# Patient Record
Sex: FEMALE | Race: WHITE | NOT HISPANIC OR LATINO | Employment: OTHER | URBAN - METROPOLITAN AREA
[De-identification: names, ages, dates, MRNs, and addresses within clinical notes are randomized per-mention and may not be internally consistent; named-entity substitution may affect disease eponyms.]

---

## 2017-04-23 ENCOUNTER — HOSPITAL ENCOUNTER (EMERGENCY)
Facility: HOSPITAL | Age: 57
Discharge: HOME | End: 2017-04-23
Attending: EMERGENCY MEDICINE
Payer: COMMERCIAL

## 2017-04-23 VITALS
WEIGHT: 180 LBS | BODY MASS INDEX: 35.34 KG/M2 | RESPIRATION RATE: 20 BRPM | HEIGHT: 60 IN | HEART RATE: 86 BPM | OXYGEN SATURATION: 98 % | TEMPERATURE: 99 F | DIASTOLIC BLOOD PRESSURE: 66 MMHG | SYSTOLIC BLOOD PRESSURE: 122 MMHG

## 2017-04-23 DIAGNOSIS — R05.9 COUGH: ICD-10-CM

## 2017-04-23 DIAGNOSIS — M94.0 COSTOCHONDRITIS, ACUTE: ICD-10-CM

## 2017-04-23 DIAGNOSIS — J40 BRONCHITIS: Primary | ICD-10-CM

## 2017-04-23 LAB
ALBUMIN SERPL BCP-MCNC: 3.9 G/DL
ALP SERPL-CCNC: 110 U/L
ALT SERPL W/O P-5'-P-CCNC: 23 U/L
ANION GAP SERPL CALC-SCNC: 12 MMOL/L
AST SERPL-CCNC: 25 U/L
BASOPHILS # BLD AUTO: 0.01 K/UL
BASOPHILS NFR BLD: 0.2 %
BILIRUB SERPL-MCNC: 0.8 MG/DL
BNP SERPL-MCNC: <10 PG/ML
BUN SERPL-MCNC: 16 MG/DL
CALCIUM SERPL-MCNC: 8.9 MG/DL
CHLORIDE SERPL-SCNC: 103 MMOL/L
CO2 SERPL-SCNC: 23 MMOL/L
CREAT SERPL-MCNC: 0.9 MG/DL
D DIMER PPP IA.FEU-MCNC: 0.39 MG/L FEU
DIFFERENTIAL METHOD: ABNORMAL
EOSINOPHIL # BLD AUTO: 0.1 K/UL
EOSINOPHIL NFR BLD: 1.6 %
ERYTHROCYTE [DISTWIDTH] IN BLOOD BY AUTOMATED COUNT: 13.7 %
EST. GFR  (AFRICAN AMERICAN): >60 ML/MIN/1.73 M^2
EST. GFR  (NON AFRICAN AMERICAN): >60 ML/MIN/1.73 M^2
FLUAV AG SPEC QL IA: NEGATIVE
FLUBV AG SPEC QL IA: NEGATIVE
GLUCOSE SERPL-MCNC: 112 MG/DL
GLUCOSE SERPL-MCNC: 133 MG/DL (ref 70–110)
HCT VFR BLD AUTO: 44.3 %
HGB BLD-MCNC: 15.2 G/DL
LYMPHOCYTES # BLD AUTO: 0.6 K/UL
LYMPHOCYTES NFR BLD: 11.5 %
MCH RBC QN AUTO: 30.6 PG
MCHC RBC AUTO-ENTMCNC: 34.3 %
MCV RBC AUTO: 89 FL
MONOCYTES # BLD AUTO: 0.5 K/UL
MONOCYTES NFR BLD: 8.6 %
NEUTROPHILS # BLD AUTO: 4.3 K/UL
NEUTROPHILS NFR BLD: 76.8 %
PLATELET # BLD AUTO: 190 K/UL
PMV BLD AUTO: 10.5 FL
POTASSIUM SERPL-SCNC: 4.4 MMOL/L
PROT SERPL-MCNC: 7.4 G/DL
RBC # BLD AUTO: 4.97 M/UL
SODIUM SERPL-SCNC: 138 MMOL/L
SPECIMEN SOURCE: NORMAL
TROPONIN I SERPL DL<=0.01 NG/ML-MCNC: <0.006 NG/ML
WBC # BLD AUTO: 5.55 K/UL

## 2017-04-23 PROCEDURE — 93005 ELECTROCARDIOGRAM TRACING: CPT

## 2017-04-23 PROCEDURE — 82962 GLUCOSE BLOOD TEST: CPT

## 2017-04-23 PROCEDURE — 84484 ASSAY OF TROPONIN QUANT: CPT

## 2017-04-23 PROCEDURE — 83880 ASSAY OF NATRIURETIC PEPTIDE: CPT

## 2017-04-23 PROCEDURE — 85379 FIBRIN DEGRADATION QUANT: CPT

## 2017-04-23 PROCEDURE — 87400 INFLUENZA A/B EACH AG IA: CPT

## 2017-04-23 PROCEDURE — 85025 COMPLETE CBC W/AUTO DIFF WBC: CPT

## 2017-04-23 PROCEDURE — 96360 HYDRATION IV INFUSION INIT: CPT

## 2017-04-23 PROCEDURE — 80053 COMPREHEN METABOLIC PANEL: CPT

## 2017-04-23 PROCEDURE — 93010 ELECTROCARDIOGRAM REPORT: CPT | Mod: ,,, | Performed by: INTERNAL MEDICINE

## 2017-04-23 PROCEDURE — 99284 EMERGENCY DEPT VISIT MOD MDM: CPT | Mod: 25

## 2017-04-23 PROCEDURE — 25000003 PHARM REV CODE 250: Performed by: NURSE PRACTITIONER

## 2017-04-23 RX ORDER — PROMETHAZINE HYDROCHLORIDE AND CODEINE PHOSPHATE 6.25; 1 MG/5ML; MG/5ML
5 SOLUTION ORAL EVERY 4 HOURS PRN
Qty: 118 ML | Refills: 0 | Status: SHIPPED | OUTPATIENT
Start: 2017-04-23 | End: 2017-05-03

## 2017-04-23 RX ORDER — AZITHROMYCIN 250 MG/1
250 TABLET, FILM COATED ORAL DAILY
Qty: 6 TABLET | Refills: 0 | Status: SHIPPED | OUTPATIENT
Start: 2017-04-23 | End: 2017-05-03

## 2017-04-23 RX ORDER — CETIRIZINE HYDROCHLORIDE 10 MG/1
10 TABLET ORAL DAILY
Qty: 30 TABLET | Refills: 0 | COMMUNITY
Start: 2017-04-23 | End: 2019-10-31

## 2017-04-23 RX ORDER — BENZONATATE 100 MG/1
100 CAPSULE ORAL 3 TIMES DAILY PRN
Qty: 20 CAPSULE | Refills: 0 | Status: SHIPPED | OUTPATIENT
Start: 2017-04-23 | End: 2017-05-03

## 2017-04-23 RX ORDER — BENZONATATE 100 MG/1
100 CAPSULE ORAL 3 TIMES DAILY PRN
Status: DISCONTINUED | OUTPATIENT
Start: 2017-04-23 | End: 2017-04-23 | Stop reason: HOSPADM

## 2017-04-23 RX ADMIN — BENZONATATE 100 MG: 100 CAPSULE ORAL at 01:04

## 2017-04-23 RX ADMIN — SODIUM CHLORIDE 1000 ML: 0.9 INJECTION, SOLUTION INTRAVENOUS at 01:04

## 2017-04-23 NOTE — ED PROVIDER NOTES
Encounter Date: 2017       History     Chief Complaint   Patient presents with    Cough     with fever of 100.7 since yesterday, states took Aleve at 1100 today.     Review of patient's allergies indicates:  No Known Allergies  HPI  Past Medical History:   Diagnosis Date    Diabetes mellitus     Hyperlipidemia     Hypertension     Osteopenia      Past Surgical History:   Procedure Laterality Date     SECTION      CHOLECYSTECTOMY       History reviewed. No pertinent family history.  Social History   Substance Use Topics    Smoking status: Never Smoker    Smokeless tobacco: None    Alcohol use No     Review of Systems    Physical Exam   Initial Vitals   BP Pulse Resp Temp SpO2   17 1313 17 1313 17 1313 17 1313 17 1313   120/67 103 16 98.2 °F (36.8 °C) 94 %     Physical Exam    ED Course   Procedures  Labs Reviewed   CBC W/ AUTO DIFFERENTIAL - Abnormal; Notable for the following:        Result Value    Lymph # 0.6 (*)     Gran% 76.8 (*)     Lymph% 11.5 (*)     All other components within normal limits   COMPREHENSIVE METABOLIC PANEL - Abnormal; Notable for the following:     Glucose 112 (*)     All other components within normal limits   INFLUENZA A AND B ANTIGEN   TROPONIN I   B-TYPE NATRIURETIC PEPTIDE   D DIMER, QUANTITATIVE   POCT GLUCOSE MONITORING CONTINUOUS                          Attending Attestation:     Physician Attestation Statement for NP/PA:   I have conducted a face to face encounter with this patient in addition to the NP/PA, due to NP/PA Request                  ED Course     Clinical Impression:   {Add your Clinical Impression here. If you haven't documented one yet, please pend the note, finalize a Clinical Impression, and refresh your note before signing.:[id number]}

## 2017-04-23 NOTE — ED PROVIDER NOTES
Encounter Date: 2017       History     Chief Complaint   Patient presents with    Cough     with fever of 100.7 since yesterday, states took Aleve at 1100 today.     Review of patient's allergies indicates:  No Known Allergies  HPI Comments: 58yo female with pmhx of HTN, HLD, DM, and osteopenia is here for cough with fever Tmax 100.7 since Friday, not yesterday as reported to triage.  Pt states that her daughter-in-law has similar symptoms.  She reports a nonproductive cough with nasal congestion and sore throat.  She also reports rib pain when coughing.  She denies CP, SOB, vomiting, diarrhea, and rash.  She has tried nothing for her symptoms.  No history of pneumonia, COPD, PE, or DVT. Never smoker.     Patient is a 57 y.o. female presenting with the following complaint: cough. The history is provided by the patient.   Cough   This is a new problem. Illness onset: Friday. The problem occurs constantly. The problem has been gradually worsening. The cough is non-productive. The maximum temperature recorded prior to her arrival was 100 - 100.9 F. The fever has been present for less than 1 day. Associated symptoms include chest pain, chills, rhinorrhea, sore throat and myalgias. Pertinent negatives include no headaches and no shortness of breath. She has tried nothing for the symptoms. She is not a smoker. Her past medical history does not include pneumonia, COPD or asthma.     Past Medical History:   Diagnosis Date    Diabetes mellitus     Hyperlipidemia     Hypertension     Osteopenia      Past Surgical History:   Procedure Laterality Date     SECTION      CHOLECYSTECTOMY       History reviewed. No pertinent family history.  Social History   Substance Use Topics    Smoking status: Never Smoker    Smokeless tobacco: None    Alcohol use No     Review of Systems   Constitutional: Positive for activity change, appetite change, chills and fever.   HENT: Positive for congestion, postnasal drip,  rhinorrhea and sore throat. Negative for trouble swallowing.    Respiratory: Positive for cough. Negative for shortness of breath.    Cardiovascular: Positive for chest pain. Negative for palpitations and leg swelling.   Gastrointestinal: Negative for abdominal pain and vomiting.   Musculoskeletal: Positive for myalgias. Negative for back pain and neck pain.   Allergic/Immunologic: Negative for immunocompromised state.   Neurological: Negative for headaches.   Psychiatric/Behavioral: Negative for confusion.       Physical Exam   Initial Vitals   BP Pulse Resp Temp SpO2   04/23/17 1313 04/23/17 1313 04/23/17 1313 04/23/17 1313 04/23/17 1313   120/67 103 16 98.2 °F (36.8 °C) 94 %     Physical Exam    Nursing note and vitals reviewed.  Constitutional: She appears well-developed and well-nourished. She is Obese . She is active and cooperative.  Non-toxic appearance. She does not have a sickly appearance. She appears ill. No distress.   HENT:   Head: Normocephalic and atraumatic.   Right Ear: External ear normal.   Left Ear: External ear normal.   Nose: Rhinorrhea present.   Mouth/Throat: Uvula is midline, oropharynx is clear and moist and mucous membranes are normal.   Eyes: Conjunctivae and EOM are normal.   Neck: Normal range of motion. Neck supple.   Cardiovascular: Normal rate, regular rhythm and normal heart sounds.   Pulmonary/Chest: Effort normal. She has no decreased breath sounds. She has no wheezes. She has rhonchi in the left upper field. She has no rales.   Abdominal: Soft. Normal appearance and bowel sounds are normal. She exhibits no distension. There is no tenderness. There is no rigidity, no rebound, no guarding and no CVA tenderness.   Neurological: She is alert and oriented to person, place, and time. She has normal strength. GCS eye subscore is 4. GCS verbal subscore is 5. GCS motor subscore is 6.   Skin: Skin is warm, dry and intact. No bruising and no rash noted. No erythema.   Psychiatric: She has  a normal mood and affect. Her speech is normal and behavior is normal. Judgment and thought content normal. Cognition and memory are normal.         ED Course   Procedures  Labs Reviewed   CBC W/ AUTO DIFFERENTIAL - Abnormal; Notable for the following:        Result Value    Lymph # 0.6 (*)     Gran% 76.8 (*)     Lymph% 11.5 (*)     All other components within normal limits   COMPREHENSIVE METABOLIC PANEL - Abnormal; Notable for the following:     Glucose 112 (*)     All other components within normal limits   INFLUENZA A AND B ANTIGEN   TROPONIN I   B-TYPE NATRIURETIC PEPTIDE   D DIMER, QUANTITATIVE   POCT GLUCOSE MONITORING CONTINUOUS              Imaging Results         X-Ray Chest PA And Lateral (Final result) Result time:  04/23/17 13:49:38    Final result by Glory Fajardo MD (04/23/17 13:49:38)    Impression:      No acute findings              Electronically signed by: GLORY FAJARDO MD  Date:     04/23/17  Time:    13:49     Narrative:    Comparison: None    Technique: PA and lateral views of the chest was obtained    Findings: The cardiac and mediastinal silhouettes are within normal limits.   The lungs are clear bilaterally. Visualized osseous structures demonstrate no acute abnormality.              Medical Decision Making:   Initial Assessment:   56yo female with pmhx of HTN, HLD, DM, and osteopenia is here for nasal congestion, nonproductive cough, and bilateral rib pain since Friday. Pt appears ill but nontoxic. SaO2 on RA 94%.  Pt has no history of COPD or asthma, never smoker. HR tachycardic. Lungs with rhonchi in LUF.  Abd soft, non-tender, no r/r/g, no distention.  Legs normal.   Differential Diagnosis:   Pneumonia, pneumothorax, bronchitis, dehydration, arrhythmia, electrolyte derangement, influenza, PE  Clinical Tests:   Lab Tests: Reviewed and Ordered  The following lab test(s) were unremarkable: CBC, CMP, Troponin, BNP and D-Dimer  Radiological Study: Ordered and Reviewed  Medical Tests:  Ordered and Reviewed  ED Management:  Labs, IV fluids, CXR, EKG  SaO2 on RA 97% at reassessment. CXR negative for infiltrate.  I feel the pt's symptoms are due to bronchitis and zithromax. Due to pt's history of DM, will RX Zithromax, Phenergan with Codeine cough syrup, tessalon, and zyrtec. Advised increased fluid intake and f/u with PCP within 2 days.  Return to the ED if condition changes, progresses, or if there are concerns.  Pt verbalized understanding and compliance.               Attending Attestation:     Physician Attestation Statement for NP/PA:   I have conducted a face to face encounter with this patient in addition to the NP/PA, due to NP/PA Request    Other NP/PA Attestation Additions:    History of Present Illness: Agree; 56 y/o F presents to ED c/o cough, onset a few sorensen ago; gradually worsening; cough is non-productive; TMax 100.9; No SOB reported;    Physical Exam: Agree   Medical Decision Making: Agree; CXR negative; Px has Rx for zithromax, promethazine with codeine, tessalon, and zyrtec; Instructed to continue medications, f/u  With PCP, return with new or worsening sx.                 ED Course     Clinical Impression:   The primary encounter diagnosis was Bronchitis. Diagnoses of Cough and Costochondritis, acute were also pertinent to this visit.          Renetta Zendejas, DALE  04/23/17 1512       John Moser MD  04/23/17 1605

## 2017-04-23 NOTE — ED NOTES
Cough, headache, sore throat, body aches with fever that began friday.  Daughter-in-law has had same symptoms recently.

## 2017-04-25 LAB — POCT GLUCOSE: 133 MG/DL (ref 70–110)

## 2017-07-31 ENCOUNTER — TELEPHONE (OUTPATIENT)
Dept: OBSTETRICS AND GYNECOLOGY | Facility: CLINIC | Age: 57
End: 2017-07-31

## 2017-07-31 NOTE — TELEPHONE ENCOUNTER
----- Message from Ngoc Adame sent at 7/31/2017 12:15 PM CDT -----  Contact: Osman from [facility]/[phone]  Osman called to get MAMMO orders sent to them for the patient.    Please call and advise.

## 2017-08-08 ENCOUNTER — OFFICE VISIT (OUTPATIENT)
Dept: SURGERY | Facility: CLINIC | Age: 57
End: 2017-08-08
Payer: COMMERCIAL

## 2017-08-08 VITALS
WEIGHT: 187.38 LBS | BODY MASS INDEX: 36.79 KG/M2 | SYSTOLIC BLOOD PRESSURE: 123 MMHG | HEIGHT: 60 IN | DIASTOLIC BLOOD PRESSURE: 88 MMHG | HEART RATE: 79 BPM | TEMPERATURE: 98 F

## 2017-08-08 DIAGNOSIS — Z01.818 PREOP TESTING: Primary | ICD-10-CM

## 2017-08-08 PROCEDURE — 99244 OFF/OP CNSLTJ NEW/EST MOD 40: CPT | Mod: S$GLB,,, | Performed by: SURGERY

## 2017-08-08 PROCEDURE — 99999 PR PBB SHADOW E&M-EST. PATIENT-LVL III: CPT | Mod: PBBFAC,,, | Performed by: SURGERY

## 2017-08-08 NOTE — PROGRESS NOTES
"GENERAL SURGERY CLINIC NOTE    Etta Irwin is a 57 y.o.  female with Hx of HTN, HLD, NIDDM2, depression, osteopenia, and newly diagnosed melanoma who presents to clinic today for evaluation. Patient reports first noticing the right lateral upper thigh lesion approx 1 year ago in retrospect. She states that she initially thought it was "a blood blister" so she thought nothing of it. Color was dark red initially. She became concerned over time when the lesion in questions became progressively darker in color and was near black. She was seen by Dermatologist (Dr. Lomeli) on 17. Punch biopsy of the lesion was performed with pathology showing superficial spreading malignant melanoma with maximum depth 0.66 mm. She denies significant sun exposure over her lifetime. States "I've never been one to be outside much."    No family history of melanoma or other skin cancers.  No ASA, Plavix, anti-coagulant.  Denies MI, stroke.      ROS:   Gen: No F/C, unintentional weight loss, night sweats, fatigue  Cardio: No chest pain, palpitations, syncope  Resp: No shortness of breath, cough, wheeze  GI: No abdominal pain, N/V, change in bowel habits, change in stool caliber or color, bleeding per rectum  : No dysuria, hematuria, frequency      Past Medical History:   Diagnosis Date    Depression     Diabetes mellitus     Hyperlipidemia     Hypertension     Osteopenia      Past Surgical History:   Procedure Laterality Date     SECTION      CHOLECYSTECTOMY      Laparoscopic     Social History     Social History    Marital status:      Spouse name: N/A    Number of children: N/A    Years of education: N/A     Occupational History    Not on file.     Social History Main Topics    Smoking status: Never Smoker    Smokeless tobacco: Not on file    Alcohol use No    Drug use: No    Sexual activity: Yes     Partners: Male     Other Topics Concern    Not on file     Social History Narrative    " No narrative on file     Review of patient's allergies indicates:  No Known Allergies      PHYSICAL EXAM:  Vitals:    08/08/17 1041   BP: 123/88   Pulse: 79   Temp: 98.4 °F (36.9 °C)       General: NAD  Neuro: AAOx4  Cardio: S1 and S2, RRR  Resp: CTAB, breathing even and unlabored  Abd: Soft, ND, NT  Ext: Warm and well perfused  Skin: Right lateral upper thigh biopsy site with scab overlying, no visible residual discolored lesion  Groin: No palpable inguinal lymphadenopathy bilaterally      PATHOLOGY:  Skin lesion punch biopsy (7/27/17): Malignant melanoma, superficial spreading type, 0.66 mm in thickness. The lesion focally extends to the peripheral margins. Morales level IV. Mitotic counts 3/mm2. No ulceration. No perineural invasion. No microscopic satellitosis. Moderate tumor infiltrating lymphocytes. No associated melanocytic nevus.      ASSESSMENT/PLAN:  57 y.o. female with newly diagnosed melanoma (T1b, 3/mm2, superficial spreading)    - Reviewed pathology report with patient today  - Lengthy discussion regarding her condition and her treatment options  - Recommended wide local excision and sentinel lymph node biopsy  - Risks, benefits, alternatives to the procedure discussed with the patient who wishes to proceed  - All questions and concerns addressed  - Consents obtained today      Kavon Byrnes MD  Surgery Resident, PGY-III  Pager: [phone]  8/8/2017 11:12 AM    I have personally taken the history and examined this patient and agree with the resident's note as stated above.  The patient presents today referred by Dr. Missy Lomeli for consultation regarding surgical management of a new right lateral thigh superficial spreading malignant melanoma measuring 0.66 mm in Breslow depth.  This was a T1b lesion with 3 mitoses per millimeter squared area.  There was no evidence of ulceration.  The lesion focally extended to the peripheral margins.  There is no evidence of in-transit disease or satellitosis.   There is no evidence of regional right inguinal lymphadenopathy.   I have recommended a 1 cm wide local excision with a sentinel lymph node biopsy for the T1b melanoma.  She has been consented and scheduled for a 1 severe wide local excision with a right groin sentinel lymph node biopsy in the main OR on Wednesday, August 16, 2017 under choice anesthesia.  This would be a good case for a laryngeal mask airway/general anesthesia.  We will perform sentinel lymph node injection intraoperatively and perform mapping intraoperatively as she should drain and mapped to the right groin.

## 2017-08-08 NOTE — LETTER
August 11, 2017      [facility]ica Coller [facility], MD  [address] [place] [address]  [place] [address]           [facility]  [address]  [facility] [address]  Phone: [phone]  Fax: [phone]          Patient: Etta Irwin   MR Number: [id number]   YOB: 1960   Date of Visit: 8/8/2017       Dear Dr. Jessica [facility]ler [facility]:    Thank you for referring Etta Irwin to me for evaluation. Attached you will find relevant portions of my assessment and plan of care.    If you have questions, please do not hesitate to call me. I look forward to following Etta Irwin along with you.    Sincerely,        Enclosure  CC:  No Recipients    If you would like to receive this communication electronically, please contact [email or web address] or [phone] to request more information on [employer] access.    For providers and/or their staff who would like to refer a patient to [facility], please contact us through our one-stop-shop provider referral line, [facility], at [phone].    If you feel you have received this communication in error or would no longer like to receive these types of communications, please e-mail [email or web address]

## 2017-08-08 NOTE — LETTER
[facility]  [address]  [facility] [address]  Phone: [phone]  Fax: [phone] August 12, 2017      Jessica Coller [facility], MD  [address] [place] [address]    Patient: Etta Irwin   MR Number: [id number]   YOB: 1960   Date of Visit: 8/8/2017     Dear Dr. [facility]:    Thank you for referring Etta Irwin to me for evaluation.     The patient presents today referred by Dr. Missy Lomeli for consultation regarding surgical management of a new right lateral thigh superficial spreading malignant melanoma measuring 0.66 mm in Breslow depth.  This was a T1b lesion with 3 mitoses per millimeter squared area.  There was no evidence of ulceration.  The lesion focally extended to the peripheral margins. There is no evidence of in-transit disease or satellitosis.  There is no evidence of regional right inguinal lymphadenopathy.     I have recommended a 1 cm wide local excision with a sentinel lymph node biopsy for the T1b melanoma.  She has been consented and scheduled for a 1 severe wide local excision with a right groin sentinel lymph node biopsy in the main OR on Wednesday, August 16, 2017 under choice anesthesia.  This would be a good case for a laryngeal mask airway/general anesthesia.  We will perform sentinel lymph node injection intraoperatively and perform mapping intraoperatively as she should drain and mapped to the right groin.    If you have questions, please do not hesitate to call me. I look forward to following Etta Irwin along with you.    Sincerely,      Huy Dunlap MD  Medical Director, [facility]  Section of General and Oncologic Surgery  [facility]    RLC/hcr    CC  Missy Lomeli MD

## 2017-08-09 DIAGNOSIS — C43.9 MALIGNANT MELANOMA, UNSPECIFIED SITE: Primary | ICD-10-CM

## 2017-08-15 ENCOUNTER — ANESTHESIA EVENT (OUTPATIENT)
Dept: SURGERY | Facility: HOSPITAL | Age: 57
End: 2017-08-15
Payer: COMMERCIAL

## 2017-08-15 ENCOUNTER — HOSPITAL ENCOUNTER (OUTPATIENT)
Dept: CARDIOLOGY | Facility: CLINIC | Age: 57
Discharge: HOME | End: 2017-08-15
Payer: COMMERCIAL

## 2017-08-15 ENCOUNTER — HOSPITAL ENCOUNTER (OUTPATIENT)
Dept: PREADMISSION TESTING | Facility: HOSPITAL | Age: 57
Discharge: HOME | End: 2017-08-15
Attending: ANESTHESIOLOGY
Payer: COMMERCIAL

## 2017-08-15 ENCOUNTER — TELEPHONE (OUTPATIENT)
Dept: SURGERY | Facility: CLINIC | Age: 57
End: 2017-08-15

## 2017-08-15 VITALS
RESPIRATION RATE: 18 BRPM | HEART RATE: 80 BPM | OXYGEN SATURATION: 98 % | TEMPERATURE: 98 F | DIASTOLIC BLOOD PRESSURE: 59 MMHG | HEIGHT: 60 IN | WEIGHT: 189.69 LBS | SYSTOLIC BLOOD PRESSURE: 119 MMHG | BODY MASS INDEX: 37.24 KG/M2

## 2017-08-15 DIAGNOSIS — Z01.818 PREOP TESTING: ICD-10-CM

## 2017-08-15 PROCEDURE — 93000 ELECTROCARDIOGRAM COMPLETE: CPT | Mod: S$GLB,,, | Performed by: INTERNAL MEDICINE

## 2017-08-15 RX ORDER — AMOXICILLIN 500 MG
2 CAPSULE ORAL DAILY
COMMUNITY

## 2017-08-15 RX ORDER — VITAMIN E 268 MG
400 CAPSULE ORAL DAILY
COMMUNITY

## 2017-08-15 NOTE — DISCHARGE INSTRUCTIONS
Your surgery has been scheduled for:__________________________________________    You should report to:  ____[facility], located on the [place] side of the first floor of the           [facility] ([phone])  ____[facility], located on the [facility] side of the            Second floor of the [facility] [phone])  ____3rd Floor [facility] located on the [facility] side of the [facility] [phone]  Please Note   - Tell your doctor if you take Aspirin, products containing Aspirin, herbal medications  or blood thinners, such as Coumadin, Ticlid, or Plavix.  (Consult your provider regarding holding or stopping before surgery).  - Arrange for someone to drive you home following surgery.  You will not be allowed to leave the surgical facility alone or drive yourself home following sedation and anesthesia.  Before Surgery  - Stop taking all herbal medications 14days prior to surgery  - No Motrin/Advil (Ibuprofen) 7 days before surgery  - No Aleve (Naproxen) 7 days before surgery  - Stop Taking Asprin, products containing Asprin _____days before surgery  - Stop taking blood thinners_______days before surgery  - Refrain from drinking alcoholic beverages for 24hours before and after surgery  - Stop or limit smoking _________days before surgery  Night before Surgery  - DO NOT EAT OR DRINK ANYTHING AFTER MIDNIGHT, INCLUDING GUM, HARD CANDY, MINTS, OR CHEWING TOBACCO.  - Take a shower or bath (shower is recommended).  Bathe with Hibiclens soap or an antibacterial soap from the neck down.  If not supplied by your surgeon, hibiclens soap will need to be purchased over the counter in pharmacy.  Rinse soap off thoroughly.  - Shampoo your hair with your regular shampoo  The Day of Surgery  - Take another bath or shower with hibiclens or any antibacterial soap, to reduce the chance of infection.  - Take heart and blood  pressure medications with a small sip of water, as advised by the perioperative team.  - Do not take fluid pills  - You may brush your teeth and rinse your mouth, but do not swall any additional water.   - Do not apply perfumes, powder, body lotions or deodorant on the day of surgery.  - Nail polish should be removed.  - Do not wear makeup or moisturizer  - Wear comfortable clothes, such as a button front shirt and loose fitting pants.  - Leave all jewelry, including body piercings, and valuables at home.    - Bring any devices you will neeed after surgery such as crutches or canes.  - If you have sleep apnea, please bring your CPAP machine  In the event that your physical condition changes including the onset of a cold or respiratory illness, or if you have to delay or cancel your surgery, please notify your surgeon.  Anesthesia: General Anesthesia  Youre due to have surgery. During surgery, youll be given medication called anesthesia. (It is also called anesthetic.) This will keep you comfortable and pain-free. Your anesthesia provider will use general anesthesia. This sheet tells you more about it.  What is general anesthesia?     You are watched continuously during your procedure by the anesthesia provider   General anesthesia puts you into a state like deep sleep. It goes into the bloodstream (IV anesthetics), into the lungs (gas anesthetics), or both. You feel nothing during the procedure. You will not remember it. During the procedure, the anesthesia provider monitors you continuously. He or she checks your heart rate and rhythm, blood pressure, breathing, and blood oxygen.  · IV Anesthetics. IV anesthetics are given through an IV line in your arm. Theyre often given first. This is so you are asleep before a gas anesthetic is started. Some kinds of IV anesthetics relieve pain. Others relax you. Your doctor will decide which kind is best in your case.  · Gas Anesthetics. Gas anesthetics are breathed into  the lungs. They are often used to keep you asleep. They can be given through a facemask or a tube placed in your larynx or trachea (breathing tube).  ? If you have a facemask, your anesthesia provider will most likely place it over your nose and mouth while youre still awake. Youll breathe oxygen through the mask as your IV anesthetic is started. Gas anesthetic may be added through the mask.  ? If you have a tube in the larynx or trachea, it will be inserted into your throat after youre asleep.  Anesthesia tools and medications  You will likely have:  · IV anesthetics. These are put into an IV line into your bloodstream.  · Gas anesthetics. You breathe these anesthetics into your lungs, where they pass into your bloodstream.  · Pulse oximeter. This is a small clip that is attached to the end of your finger. This measures your blood oxygen level.  · Electrocardiography leads (electrodes). These are small sticky pads that are placed on your chest. They record your heart rate and rhythm.  · Blood pressure cuff. This reads your blood pressure.  Risks and possible complications  General anesthesia has some risks. These include:  · Breathing problems  · Nausea and vomiting  · Sore throat or hoarseness (usually temporary)  · Allergic reaction to the anesthetic  · Irregular heartbeat (rare)  · Cardiac arrest (rare)   Anesthesia safety  · Follow all instructions you are given for how long not to eat or drink before your procedure.  · Be sure your doctor knows what medications and drugs you take. This includes over-the-counter medications, herbs, supplements, alcohol or other drugs. You will be asked when those were last taken.  · Have an adult family member or friend drive you home after the procedure.  · For the first 24 hours after your surgery:  ? Do not drive or use heavy equipment.  ? Have a trusted family member or spouse make important decisions or sign documents.  ? Avoid alcohol.  ? Have a responsible adult stay  with you. He or she can watch for problems and help keep you safe.  Date Last Reviewed: 10/16/2014  © 5780-1856 [employer] [address] [place] [address], [place] [address]. All rights reserved. This information is not intended as a substitute for professional medical care. Always follow your healthcare professional's instructions.

## 2017-08-15 NOTE — ANESTHESIA PREPROCEDURE EVALUATION
Pre-operative evaluation for Procedure(s) (LRB):  EXCISION-WIDE LOCAL  1 cm Right Lateral Thigh (Right)  BIOPSY-LYMPH NODE-SENTINEL right groin (Right)  INJECTION-NODE-SENTINEL (Right)    Etta Irwin is a 57 y.o. female with past history significant for HTN, DMII, HLD, depression and newly diagnosed melanoma on her R lateral thigh who presents for above procedures.    Prev airway: none on file      There is no problem list on file for this patient.      Review of patient's allergies indicates:  No Known Allergies     No current facility-administered medications on file prior to encounter.      Current Outpatient Prescriptions on File Prior to Encounter   Medication Sig Dispense Refill    alendronate (FOSAMAX) 70 MG tablet Take 1 tablet by mouth once daily.      amitriptyline (ELAVIL) 50 MG tablet Take 1 tablet by mouth every evening.       atorvastatin (LIPITOR) 40 MG tablet Take 1 tablet by mouth every evening.       cetirizine (ZYRTEC) 10 MG tablet Take 1 tablet (10 mg total) by mouth once daily. 30 tablet 0    escitalopram oxalate (LEXAPRO) 20 MG tablet Take 1 tablet by mouth every evening.       JANUMET  mg per tablet Take 1 tablet by mouth 2 (two) times daily with meals.       JARDIANCE 10 mg Tab       lisinopril (PRINIVIL,ZESTRIL) 20 MG tablet Take 1 tablet by mouth once daily.      pioglitazone (ACTOS) 30 MG tablet Take 1 tablet by mouth once daily.         Past Surgical History:   Procedure Laterality Date     SECTION      CHOLECYSTECTOMY      Laparoscopic       Social History     Social History    Marital status:      Spouse name: N/A    Number of children: N/A    Years of education: N/A     Occupational History    Not on file.     Social History Main Topics    Smoking status: Never Smoker    Smokeless tobacco: Not on file    Alcohol use No    Drug use: No    Sexual activity: Yes     Partners: Male     Other Topics Concern    Not on file     Social History  Narrative    No narrative on file         Vital Signs Range (Last 24H):  Temp:  [36.7 °C (98.1 °F)]   Pulse:  [80]   Resp:  [18]   BP: (119)/(59)   SpO2:  [98 %]       CBC:   Recent Labs      08/15/17   1444   WBC  7.13   RBC  4.91   HGB  14.8   HCT  44.8   PLT  254   MCV  91   MCH  30.1   MCHC  33.0       CMP: No results for input(s): NA, K, CL, CO2, BUN, CREATININE, GLU, MG, PHOS, CALCIUM, ALBUMIN, PROT, ALKPHOS, ALT, AST, BILITOT in the last 72 hours.    INR  No results for input(s): INR, PROTIME, APTT in the last 72 hours.    Invalid input(s): PT        EKG 8/15/2017:  Vent. Rate : 076 BPM     Atrial Rate : 076 BPM     P-R Int : 142 ms          QRS Dur : 080 ms      QT Int : 378 ms       P-R-T Axes : 030 024 047 degrees     QTc Int : 425 ms    Normal sinus rhythm  Normal ECG  When compared with ECG of 23-APR-2017 14:12,  Nonspecific T wave abnormality no longer evident in Anterior-lateral leads    2D Echo:  None on file        Anesthesia Evaluation    I have reviewed the Patient Summary Reports.        Review of Systems  Anesthesia Hx:  No problems with previous Anesthesia  History of prior surgery of interest to airway management or planning: Previous anesthesia: General  Denies Personal Hx of Anesthesia complications.   Social:  Non-Smoker, No Alcohol Use    Hematology/Oncology:  Hematology Normal      Current/Recent Cancer. 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  Denies CP or sob   Pulmonary:   Acute Bronchitis: (4/2017-tx with abx and steroid injection. f/u with PCP done 5/2017. Patient states no more breathing issues since then. ) secondary to URI.  Possible Obstructive Sleep Apnea , (STOP/BANG) Symptoms A - Age > 50, S - Snoring (loud) and P - Pressure being treated for high BP    Renal/:  Renal/ Normal     Hepatic/GI:   Denies GERD. Denies Liver Disease.    Musculoskeletal:  Musculoskeletal Normal     Neurological:   Denies CVA. Denies Seizures.    Endocrine:   Diabetes, type 2 Patient is followed by an outside PCP. Patient did not know her A1c but reports her blood glucose is <100.   Dermatological:   Melanoma right thigh   Psych:   depression          Physical Exam  General:  Well nourished    Airway/Jaw/Neck:  Airway Findings: Mouth Opening: Small, but > 3cm Tongue: Normal  General Airway Assessment: Adult  Mallampati: III  Improves to II with phonation.  Jaw/Neck Findings:  Neck ROM: Normal ROM      Dental:  Dental Findings: In tact   Chest/Lungs:  Chest/Lungs Findings: Clear to auscultation, Normal Respiratory Rate     Heart/Vascular:  Heart Findings: Rate: Normal  Rhythm: Regular Rhythm  Sounds: Normal        Mental Status:  Mental Status Findings:  Cooperative, Alert and Oriented           Marisel Leon RN 08/15/2017            Anesthesia Plan  Type of Anesthesia, risks & benefits discussed:  Anesthesia Type:  general  Patient's Preference:   Intra-op Monitoring Plan: standard ASA monitors  Intra-op Monitoring Plan Comments:   Post Op Pain Control Plan: multimodal analgesia and IV/PO Opioids PRN  Post Op Pain Control Plan Comments:   Induction:   IV  Beta Blocker:  Patient is not currently on a Beta-Blocker (No further documentation required).       Informed Consent: Patient understands risks and agrees with Anesthesia plan.  Questions answered. Anesthesia consent signed with patient.  ASA Score: 2     Day of Surgery Review of History & Physical:    H&P update referred to the surgeon.     Anesthesia Plan Notes: Patient on lisinopril        Ready For Surgery From Anesthesia Perspective.

## 2017-08-16 ENCOUNTER — SURGERY (OUTPATIENT)
Age: 57
End: 2017-08-16

## 2017-08-16 ENCOUNTER — HOSPITAL ENCOUNTER (OUTPATIENT)
Dept: RADIOLOGY | Facility: HOSPITAL | Age: 57
Discharge: HOME | End: 2017-08-16
Attending: SURGERY
Payer: COMMERCIAL

## 2017-08-16 ENCOUNTER — HOSPITAL ENCOUNTER (OUTPATIENT)
Facility: HOSPITAL | Age: 57
Discharge: HOME | End: 2017-08-16
Attending: SURGERY | Admitting: SURGERY
Payer: COMMERCIAL

## 2017-08-16 ENCOUNTER — ANESTHESIA (OUTPATIENT)
Dept: SURGERY | Facility: HOSPITAL | Age: 57
End: 2017-08-16
Payer: COMMERCIAL

## 2017-08-16 DIAGNOSIS — C43.9 MALIGNANT MELANOMA: Primary | ICD-10-CM

## 2017-08-16 DIAGNOSIS — C43.9 MALIGNANT MELANOMA, UNSPECIFIED SITE: ICD-10-CM

## 2017-08-16 LAB
POCT GLUCOSE: 101 MG/DL (ref 70–110)
POCT GLUCOSE: 112 MG/DL (ref 70–110)

## 2017-08-16 PROCEDURE — 63600175 PHARM REV CODE 636 W HCPCS: Performed by: SURGERY

## 2017-08-16 PROCEDURE — 37000009 HC ANESTHESIA EA ADD 15 MINS: Performed by: SURGERY

## 2017-08-16 PROCEDURE — 38792 RA TRACER ID OF SENTINL NODE: CPT | Mod: TC

## 2017-08-16 PROCEDURE — 71000039 HC RECOVERY, EACH ADD'L HOUR: Performed by: SURGERY

## 2017-08-16 PROCEDURE — 36000706: Performed by: SURGERY

## 2017-08-16 PROCEDURE — D9220A PRA ANESTHESIA: Mod: ,,, | Performed by: ANESTHESIOLOGY

## 2017-08-16 PROCEDURE — 25000003 PHARM REV CODE 250: Performed by: STUDENT IN AN ORGANIZED HEALTH CARE EDUCATION/TRAINING PROGRAM

## 2017-08-16 PROCEDURE — 71000015 HC POSTOP RECOV 1ST HR: Performed by: SURGERY

## 2017-08-16 PROCEDURE — 63600175 PHARM REV CODE 636 W HCPCS

## 2017-08-16 PROCEDURE — 11604 EXC TR-EXT MAL+MARG 3.1-4 CM: CPT | Mod: 51,,, | Performed by: SURGERY

## 2017-08-16 PROCEDURE — 94761 N-INVAS EAR/PLS OXIMETRY MLT: CPT

## 2017-08-16 PROCEDURE — A9520 TC99 TILMANOCEPT DIAG 0.5MCI: HCPCS

## 2017-08-16 PROCEDURE — 38500 BIOPSY/REMOVAL LYMPH NODES: CPT | Mod: RT,,, | Performed by: SURGERY

## 2017-08-16 PROCEDURE — 88341 IMHCHEM/IMCYTCHM EA ADD ANTB: CPT | Mod: 26,,, | Performed by: PATHOLOGY

## 2017-08-16 PROCEDURE — 12034 INTMD RPR S/TR/EXT 7.6-12.5: CPT | Mod: 59,,, | Performed by: SURGERY

## 2017-08-16 PROCEDURE — 88305 TISSUE EXAM BY PATHOLOGIST: CPT | Performed by: PATHOLOGY

## 2017-08-16 PROCEDURE — 82962 GLUCOSE BLOOD TEST: CPT | Performed by: SURGERY

## 2017-08-16 PROCEDURE — 38900 IO MAP OF SENT LYMPH NODE: CPT | Mod: ,,, | Performed by: SURGERY

## 2017-08-16 PROCEDURE — 38792 RA TRACER ID OF SENTINL NODE: CPT | Mod: ,,, | Performed by: RADIOLOGY

## 2017-08-16 PROCEDURE — 36000707: Performed by: SURGERY

## 2017-08-16 PROCEDURE — 88342 IMHCHEM/IMCYTCHM 1ST ANTB: CPT | Mod: 26,,, | Performed by: PATHOLOGY

## 2017-08-16 PROCEDURE — 63600175 PHARM REV CODE 636 W HCPCS: Performed by: STUDENT IN AN ORGANIZED HEALTH CARE EDUCATION/TRAINING PROGRAM

## 2017-08-16 PROCEDURE — S0028 INJECTION, FAMOTIDINE, 20 MG: HCPCS | Performed by: STUDENT IN AN ORGANIZED HEALTH CARE EDUCATION/TRAINING PROGRAM

## 2017-08-16 PROCEDURE — 27000221 HC OXYGEN, UP TO 24 HOURS

## 2017-08-16 PROCEDURE — 71000033 HC RECOVERY, INTIAL HOUR: Performed by: SURGERY

## 2017-08-16 PROCEDURE — 63600175 PHARM REV CODE 636 W HCPCS: Performed by: ANESTHESIOLOGY

## 2017-08-16 PROCEDURE — 37000008 HC ANESTHESIA 1ST 15 MINUTES: Performed by: SURGERY

## 2017-08-16 RX ORDER — ONDANSETRON 2 MG/ML
INJECTION INTRAMUSCULAR; INTRAVENOUS
Status: DISCONTINUED | OUTPATIENT
Start: 2017-08-16 | End: 2017-08-16

## 2017-08-16 RX ORDER — KETOROLAC TROMETHAMINE 30 MG/ML
INJECTION, SOLUTION INTRAMUSCULAR; INTRAVENOUS
Status: COMPLETED
Start: 2017-08-16 | End: 2017-08-16

## 2017-08-16 RX ORDER — LIDOCAINE HCL/PF 100 MG/5ML
SYRINGE (ML) INTRAVENOUS
Status: DISCONTINUED | OUTPATIENT
Start: 2017-08-16 | End: 2017-08-16

## 2017-08-16 RX ORDER — SODIUM CHLORIDE 9 MG/ML
INJECTION, SOLUTION INTRAVENOUS CONTINUOUS
Status: DISCONTINUED | OUTPATIENT
Start: 2017-08-16 | End: 2017-08-16 | Stop reason: HOSPADM

## 2017-08-16 RX ORDER — OXYCODONE AND ACETAMINOPHEN 5; 325 MG/1; MG/1
1 TABLET ORAL
Status: DISCONTINUED | OUTPATIENT
Start: 2017-08-16 | End: 2017-08-16 | Stop reason: HOSPADM

## 2017-08-16 RX ORDER — PROPOFOL 10 MG/ML
VIAL (ML) INTRAVENOUS
Status: DISCONTINUED | OUTPATIENT
Start: 2017-08-16 | End: 2017-08-16

## 2017-08-16 RX ORDER — PHENYLEPHRINE HYDROCHLORIDE 10 MG/ML
INJECTION INTRAVENOUS
Status: DISCONTINUED | OUTPATIENT
Start: 2017-08-16 | End: 2017-08-16

## 2017-08-16 RX ORDER — FENTANYL CITRATE 50 UG/ML
25 INJECTION, SOLUTION INTRAMUSCULAR; INTRAVENOUS EVERY 5 MIN PRN
Status: COMPLETED | OUTPATIENT
Start: 2017-08-16 | End: 2017-08-16

## 2017-08-16 RX ORDER — ISOSULFAN BLUE 50 MG/5ML
INJECTION, SOLUTION SUBCUTANEOUS
Status: DISCONTINUED | OUTPATIENT
Start: 2017-08-16 | End: 2017-08-16 | Stop reason: HOSPADM

## 2017-08-16 RX ORDER — SODIUM CHLORIDE 0.9 % (FLUSH) 0.9 %
3 SYRINGE (ML) INJECTION
Status: DISCONTINUED | OUTPATIENT
Start: 2017-08-16 | End: 2017-08-16 | Stop reason: HOSPADM

## 2017-08-16 RX ORDER — FENTANYL CITRATE 50 UG/ML
INJECTION, SOLUTION INTRAMUSCULAR; INTRAVENOUS
Status: COMPLETED
Start: 2017-08-16 | End: 2017-08-16

## 2017-08-16 RX ORDER — KETOROLAC TROMETHAMINE 15 MG/ML
30 INJECTION, SOLUTION INTRAMUSCULAR; INTRAVENOUS ONCE
Status: DISCONTINUED | OUTPATIENT
Start: 2017-08-16 | End: 2017-08-16 | Stop reason: HOSPADM

## 2017-08-16 RX ORDER — ACETAMINOPHEN 10 MG/ML
INJECTION, SOLUTION INTRAVENOUS
Status: DISCONTINUED | OUTPATIENT
Start: 2017-08-16 | End: 2017-08-16

## 2017-08-16 RX ORDER — FENTANYL CITRATE 50 UG/ML
INJECTION, SOLUTION INTRAMUSCULAR; INTRAVENOUS
Status: DISCONTINUED | OUTPATIENT
Start: 2017-08-16 | End: 2017-08-16

## 2017-08-16 RX ORDER — MIDAZOLAM HYDROCHLORIDE 1 MG/ML
INJECTION, SOLUTION INTRAMUSCULAR; INTRAVENOUS
Status: DISCONTINUED | OUTPATIENT
Start: 2017-08-16 | End: 2017-08-16

## 2017-08-16 RX ORDER — FAMOTIDINE 10 MG/ML
INJECTION INTRAVENOUS
Status: DISCONTINUED | OUTPATIENT
Start: 2017-08-16 | End: 2017-08-16

## 2017-08-16 RX ORDER — SODIUM CHLORIDE 9 MG/ML
INJECTION, SOLUTION INTRAVENOUS CONTINUOUS
Status: DISCONTINUED | OUTPATIENT
Start: 2017-08-16 | End: 2017-08-16 | Stop reason: SDUPTHER

## 2017-08-16 RX ORDER — OXYCODONE AND ACETAMINOPHEN 5; 325 MG/1; MG/1
1 TABLET ORAL EVERY 4 HOURS PRN
Qty: 41 TABLET | Refills: 0 | Status: SHIPPED | OUTPATIENT
Start: 2017-08-16 | End: 2017-10-26

## 2017-08-16 RX ADMIN — OXYCODONE HYDROCHLORIDE AND ACETAMINOPHEN 1 TABLET: 5; 325 TABLET ORAL at 03:08

## 2017-08-16 RX ADMIN — FENTANYL CITRATE 50 MCG: 50 INJECTION, SOLUTION INTRAMUSCULAR; INTRAVENOUS at 01:08

## 2017-08-16 RX ADMIN — PROPOFOL 50 MG: 10 INJECTION, EMULSION INTRAVENOUS at 02:08

## 2017-08-16 RX ADMIN — PHENYLEPHRINE HYDROCHLORIDE 200 MCG: 10 INJECTION INTRAVENOUS at 01:08

## 2017-08-16 RX ADMIN — ONDANSETRON 4 MG: 2 INJECTION INTRAMUSCULAR; INTRAVENOUS at 01:08

## 2017-08-16 RX ADMIN — KETOROLAC TROMETHAMINE 30 MG: 30 INJECTION, SOLUTION INTRAMUSCULAR at 04:08

## 2017-08-16 RX ADMIN — LIDOCAINE HYDROCHLORIDE 60 MG: 20 INJECTION, SOLUTION INTRAVENOUS at 12:08

## 2017-08-16 RX ADMIN — FENTANYL CITRATE 100 MCG: 50 INJECTION, SOLUTION INTRAMUSCULAR; INTRAVENOUS at 12:08

## 2017-08-16 RX ADMIN — PROPOFOL 20 MG: 10 INJECTION, EMULSION INTRAVENOUS at 01:08

## 2017-08-16 RX ADMIN — PHENYLEPHRINE HYDROCHLORIDE 100 MCG: 10 INJECTION INTRAVENOUS at 02:08

## 2017-08-16 RX ADMIN — MIDAZOLAM HYDROCHLORIDE 1 MG: 1 INJECTION, SOLUTION INTRAMUSCULAR; INTRAVENOUS at 12:08

## 2017-08-16 RX ADMIN — FENTANYL CITRATE 25 MCG: 50 INJECTION INTRAMUSCULAR; INTRAVENOUS at 03:08

## 2017-08-16 RX ADMIN — ISOSULFAN BLUE 3 ML: 10 INJECTION, SOLUTION SUBCUTANEOUS at 01:08

## 2017-08-16 RX ADMIN — EPHEDRINE SULFATE 10 MG: 50 INJECTION, SOLUTION INTRAMUSCULAR; INTRAVENOUS; SUBCUTANEOUS at 01:08

## 2017-08-16 RX ADMIN — PHENYLEPHRINE HYDROCHLORIDE 100 MCG: 10 INJECTION INTRAVENOUS at 01:08

## 2017-08-16 RX ADMIN — SODIUM CHLORIDE: 0.9 INJECTION, SOLUTION INTRAVENOUS at 12:08

## 2017-08-16 RX ADMIN — FENTANYL CITRATE 50 MCG: 50 INJECTION, SOLUTION INTRAMUSCULAR; INTRAVENOUS at 02:08

## 2017-08-16 RX ADMIN — FAMOTIDINE 20 MG: 10 INJECTION, SOLUTION INTRAVENOUS at 01:08

## 2017-08-16 RX ADMIN — SODIUM CHLORIDE, SODIUM GLUCONATE, SODIUM ACETATE, POTASSIUM CHLORIDE, MAGNESIUM CHLORIDE, SODIUM PHOSPHATE, DIBASIC, AND POTASSIUM PHOSPHATE: .53; .5; .37; .037; .03; .012; .00082 INJECTION, SOLUTION INTRAVENOUS at 01:08

## 2017-08-16 RX ADMIN — PROPOFOL 150 MG: 10 INJECTION, EMULSION INTRAVENOUS at 12:08

## 2017-08-16 RX ADMIN — Medication 2 G: at 12:08

## 2017-08-16 RX ADMIN — PROPOFOL 50 MG: 10 INJECTION, EMULSION INTRAVENOUS at 12:08

## 2017-08-16 RX ADMIN — ACETAMINOPHEN 1000 MG: 10 INJECTION, SOLUTION INTRAVENOUS at 01:08

## 2017-08-16 NOTE — DISCHARGE INSTRUCTIONS
Procedural Sedation (Adult)  You have been given medicine by vein to make you sleep during your surgery. This may have included both a pain medicine and sleeping medicine. Most of the effects have worn off. But you may still have some drowsiness for the next 6 to 8 hours.  Home care  Follow these guidelines when you get home:  · For the next 8 hours, you should be watched by a responsible adult. This person should make sure your condition is not getting worse.  · Don't take any medicine by mouth for pain or for sleep during the next 4 hours. These might react with the medicines you were given in the hospital. This could cause a much stronger response than usual.  · Don't drink any alcohol for the next 24 hours.  · Don't drive, operate dangerous machinery, or make important business or personal decisions during the next 24 hours.  Follow-up care  Follow up with your healthcare provider if you are not alert and back to your usual level of activity within 12 hours.  When to seek medical advice  Call your healthcare provider right away if any of these occur:  · Drowsiness gets worse  · Weakness or dizziness gets worse  · Repeated vomiting  · You cannot be awakened   Date Last Reviewed: 10/18/2016  © 0937-6224 [employer]. [address] [place] [address], [place] [address]. All rights reserved. This information is not intended as a substitute for professional medical care. Always follow your healthcare professional's instructions.

## 2017-08-16 NOTE — H&P (VIEW-ONLY)
"GENERAL SURGERY CLINIC NOTE    Etta Irwin is a 57 y.o.  female with Hx of HTN, HLD, NIDDM2, depression, osteopenia, and newly diagnosed melanoma who presents to clinic today for evaluation. Patient reports first noticing the right lateral upper thigh lesion approx 1 year ago in retrospect. She states that she initially thought it was "a blood blister" so she thought nothing of it. Color was dark red initially. She became concerned over time when the lesion in questions became progressively darker in color and was near black. She was seen by Dermatologist (Dr. Lomeli) on 17. Punch biopsy of the lesion was performed with pathology showing superficial spreading malignant melanoma with maximum depth 0.66 mm. She denies significant sun exposure over her lifetime. States "I've never been one to be outside much."    No family history of melanoma or other skin cancers.  No ASA, Plavix, anti-coagulant.  Denies MI, stroke.      ROS:   Gen: No F/C, unintentional weight loss, night sweats, fatigue  Cardio: No chest pain, palpitations, syncope  Resp: No shortness of breath, cough, wheeze  GI: No abdominal pain, N/V, change in bowel habits, change in stool caliber or color, bleeding per rectum  : No dysuria, hematuria, frequency      Past Medical History:   Diagnosis Date    Depression     Diabetes mellitus     Hyperlipidemia     Hypertension     Osteopenia      Past Surgical History:   Procedure Laterality Date     SECTION      CHOLECYSTECTOMY      Laparoscopic     Social History     Social History    Marital status:      Spouse name: N/A    Number of children: N/A    Years of education: N/A     Occupational History    Not on file.     Social History Main Topics    Smoking status: Never Smoker    Smokeless tobacco: Not on file    Alcohol use No    Drug use: No    Sexual activity: Yes     Partners: Male     Other Topics Concern    Not on file     Social History Narrative    " No narrative on file     Review of patient's allergies indicates:  No Known Allergies      PHYSICAL EXAM:  Vitals:    08/08/17 1041   BP: 123/88   Pulse: 79   Temp: 98.4 °F (36.9 °C)       General: NAD  Neuro: AAOx4  Cardio: S1 and S2, RRR  Resp: CTAB, breathing even and unlabored  Abd: Soft, ND, NT  Ext: Warm and well perfused  Skin: Right lateral upper thigh biopsy site with scab overlying, no visible residual discolored lesion  Groin: No palpable inguinal lymphadenopathy bilaterally      PATHOLOGY:  Skin lesion punch biopsy (7/27/17): Malignant melanoma, superficial spreading type, 0.66 mm in thickness. The lesion focally extends to the peripheral margins. Morales level IV. Mitotic counts 3/mm2. No ulceration. No perineural invasion. No microscopic satellitosis. Moderate tumor infiltrating lymphocytes. No associated melanocytic nevus.      ASSESSMENT/PLAN:  57 y.o. female with newly diagnosed melanoma (T1b, 3/mm2, superficial spreading)    - Reviewed pathology report with patient today  - Lengthy discussion regarding her condition and her treatment options  - Recommended wide local excision and sentinel lymph node biopsy  - Risks, benefits, alternatives to the procedure discussed with the patient who wishes to proceed  - All questions and concerns addressed  - Consents obtained today      Kavon Byrnes MD  Surgery Resident, PGY-III  Pager: [phone]  8/8/2017 11:12 AM    I have personally taken the history and examined this patient and agree with the resident's note as stated above.  The patient presents today referred by Dr. Missy Lomeli for consultation regarding surgical management of a new right lateral thigh superficial spreading malignant melanoma measuring 0.66 mm in Breslow depth.  This was a T1b lesion with 3 mitoses per millimeter squared area.  There was no evidence of ulceration.  The lesion focally extended to the peripheral margins.  There is no evidence of in-transit disease or satellitosis.   There is no evidence of regional right inguinal lymphadenopathy.   I have recommended a 1 cm wide local excision with a sentinel lymph node biopsy for the T1b melanoma.  She has been consented and scheduled for a 1 severe wide local excision with a right groin sentinel lymph node biopsy in the main OR on Wednesday, August 16, 2017 under choice anesthesia.  This would be a good case for a laryngeal mask airway/general anesthesia.  We will perform sentinel lymph node injection intraoperatively and perform mapping intraoperatively as she should drain and mapped to the right groin.

## 2017-08-16 NOTE — PLAN OF CARE
Vital signs stable, pt on room air. Pt complains of throbbing to rt thigh, given toradol per order. Dr Esteves at bedside to assess pt, okayed transfer to [facility] for discharge.

## 2017-08-16 NOTE — OP NOTE
DATE OF PROCEDURE:  08/16/2017    LOCATION:  [facility].    PRIMARY SURGEON:  Huy Dunlap M.D.    ASSISTANT:  Kavon Byrnes M.D. (RES).    ANESTHESIA:  General anesthesia.    PREOPERATIVE DIAGNOSIS:  T1B thin invasive superficial spreading malignant   melanoma of the right lateral thigh measuring 0.66 mm in B[place] depth, but with   3 mitoses per mm2 with no evidence of ulceration.    POSTOPERATIVE DIAGNOSES:  T1B thin invasive superficial spreading malignant   melanoma of the right lateral thigh measuring 0.66 mm in B[place] depth, but with   3 mitoses per mm2 with no evidence of ulceration.    PROCEDURES:  1.  A 1 cm wide local excision of the prior 1.2 cm biopsy site of the right   lateral thigh with specimen of resection measuring 3.2 cm from medial to lateral   and 9 cm in length from superior to inferior with a primary closure of the 9 cm   wound in multiple layers of absorbable sutures with 3 layers of absorbable   suture.  2.  Injection intradermally of the isosulfan Lymphazurin blue dye and   technetium-labeled radiocolloid for sentinel lymph node identification.  3.  Right groin deep inguinal sentinel lymph node biopsy, excision x1.    PROCEDURE IN DETAIL:  The patient underwent informed consent, the history and   physical examination was reviewed and updated.  The right lateral thigh prior   biopsy site and right groin were marked and identified for the procedure.  The   patient was brought to the Operating Room.  She was in a supine position.  She   underwent general anesthesia.  The right lateral thigh was marked with a   vertical longitudinal ellipse to take a 1 cm margin around the prior 1.2 cm   biopsy site making the width of the resection 3.2 cm.  The length of the ellipse   measured approximately 9 cm.  The isosulfan Lymphazurin blue dye and the   technetium-labeled radiocolloid with the melanoma protocol were injected   intradermally within the skin around the  lesion within the area of anticipated   resection.  This was allowed to migrate to the right groin, while the right   lower extremity was prepped and draped in a sterile fashion.    A hot spot was noted in the right groin.  A small oblique incision was made in   the right groin just inferior and parallel to the inguinal crease.  The   subcutaneous tissue was divided with cautery.  We identified a hot blue sentinel   node, which was excised and it had an ex vivo count of approximately 400.  Once   this was removed, the background residual radioactivity counts were negligible   in the right groin.  There was no further blue dye staining and there were no   further palpable nodes in the right groin, indicating adequate and appropriate   sentinel lymph node biopsy.  The groin incision was irrigated and temporarily   packed with a lap pad after it was made hemostatic.  We then turned our   attention to the right lateral thigh, the skin was incised sharply to excise the   longitudinal ellipse again measuring 3.2 cm wide x 9 cm in length,   full-thickness skin and subcutaneous tissue was taken down to the underlying   fascia, which was preserved.  The specimen was removed with cautery and oriented   with a short stitch superiorly on the long axis of the ellipse and a long   stitch laterally on the short axis of the ellipse.  The specimen was sent to   Pathology for permanent sectioning as was the right groin sentinel node.  The   wound was irrigated.  It was made hemostatic.  It was closed in multiple layers,   reapproximating the subcutaneous tissue and superficial fascial layers with   interrupted 2-0 Vicryl suture with intermittent deep 3-point fixation down to   the posterior aspect of the wound near the muscular fascia to obliterate any   potential dead space and minimize postoperative fluid collection.  The deep   dermal and subcutaneous layers were further reapproximated with interrupted 3-0   Vicryl sutures to  reapproximate the skin without tension.  The skin was then   closed with a running 4-0 Monocryl subcuticular skin closure.  Mastisol,   Steri-Strips, sterile Telfa gauze and Tegaderm dressing were applied.  The groin   was inspected.  There was no evidence of bleeding.  The superficial fascial   layer was reapproximated with Vicryl sutures to obliterate any potential dead   space down to the deep underlying tissue at the biopsy site for the sentinel   node.  The deep dermal and subcutaneous layers were further closed with 3-0   Vicryl suture and the skin closed with a running 4-0 Monocryl subcuticular skin   closure.  Again Mastisol, Steri-Strips, sterile Telfa gauze and Tegaderm   dressing were applied.  She tolerated the procedure overall well without   complication.  Estimated blood loss had been minimal.  No drains were placed.    No specimens were sent for frozen section.  All specimens were sent to Pathology   for permanent sectioning.  She was turned over to Anesthesia for extubation and   transport to the recovery area in a satisfactory condition.      DIVINA/CHERYL  dd: 08/16/2017 14:27:16 (CDT)  td: 08/16/2017 18:40:09 (CD)  Doc ID   #[id number]  Job ID #[id number]    CC:

## 2017-08-16 NOTE — OR NURSING
1.) Right groin sentinel lymph node. Hot and blue 400. (permanent)  2.) Right lateral thigh 1 cm wide local excision. Short stitch= superior, long stitch= lateral. (permanent)

## 2017-08-16 NOTE — TRANSFER OF CARE
"Anesthesia Transfer of Care Note    Patient: Etta Irwin    Procedure(s) Performed: Procedure(s) (LRB):  EXCISION-WIDE LOCAL  1 cm Right Lateral Thigh (Right)  BIOPSY-LYMPH NODE-SENTINEL right groin (Right)  INJECTION-NODE-SENTINEL (Right)    Patient location: PACU    Anesthesia Type: general    Transport from OR: Transported from OR on 6-10 L/min O2 by face mask with adequate spontaneous ventilation    Post pain: adequate analgesia    Post assessment: no apparent anesthetic complications    Post vital signs: stable    Level of consciousness: awake    Nausea/Vomiting: no nausea/vomiting    Complications: none    Transfer of care protocol was followed      Last vitals:   Visit Vitals  /71   Pulse 91   Temp 36.3 °C (97.3 °F) (Temporal)   Resp 14   Ht 5' 4" (1.626 m)   Wt 84.8 kg (187 lb)   SpO2 (!) 94%   Breastfeeding? No   BMI 32.10 kg/m²     "

## 2017-08-16 NOTE — PLAN OF CARE
Patient and spouse state they are ready to be discharged. Instructions and narcotic prescription given to patient and family. Both verbalize understanding. Patient tolerating po liquids with no difficulty. Patient states pain is at a tolerable level for them. Anesthesia consent and surgical consent in chart upon patient's discharge from [facility].

## 2017-08-16 NOTE — BRIEF OP NOTE
[facility]  Brief Operative Note     SUMMARY     Surgery Date: 8/16/2017     Surgeon(s) and Role:     * Huy Dunlap MD - Primary     * Kavon Byrnes MD - Resident - Assisting    Pre-op Diagnosis:  Malignant melanoma, unspecified site [C43.9]    Post-op Diagnosis:  Post-Op Diagnosis Codes:     * Malignant melanoma, unspecified site [C43.9]    Procedure(s) (LRB):  EXCISION-WIDE LOCAL  1 cm Right Lateral Thigh (Right)  BIOPSY-LYMPH NODE-SENTINEL right groin (Right)  INJECTION-NODE-SENTINEL (Right)    Anesthesia: Local/MAC    Description of the findings of the procedure: Wide local excision of right lateral thigh melanoma with sentinel lymph node biopsy    Findings/Key Components: [place] lymph node R groin, hot 500 and blue    Estimated Blood Loss: 5 mL         Total IV Fluids: Per Anesthesia    Specimens:   Specimen (12h ago through future)    Start     Ordered    08/16/17 1348  Specimen to Pathology - Surgery  Once     Comments:  1.) Right groin sentinel lymph node. Hot and blue 400. (permanent)2.) Right lateral thigh 1 cm wide local excision. Short stitch= superior, long stitch= lateral. (permanent)      08/16/17 1347          Discharge Note    SUMMARY     Admit Date: 8/16/2017    Discharge Date:  08/16/2017    Hospital Course:  Patient is a 57-year-old female with melanoma of the right lateral thigh who presented today for planned outpatient procedure. He was taken to the Operating Room for wide local excision of the right lateral thigh melanoma and sentinel lymph node biopsy. She tolerated the procedure well with no apparent complication. She was discharged to home in good condition following an uneventful stay in Recovery.    Final Diagnosis: Post-Op Diagnosis Codes:     * Malignant melanoma, unspecified site [C43.9]    Disposition: Home or Self Care    Follow Up/Patient Instructions: Follow up with Dr. Dunlap in 2 weeks.    Medications:  Reconciled Home Medications:   Current Discharge  Medication List      START taking these medications    Details   oxycodone-acetaminophen (PERCOCET) 5-325 mg per tablet Take 1 tablet by mouth every 4 (four) hours as needed.  Qty: 41 tablet, Refills: 0         CONTINUE these medications which have NOT CHANGED    Details   alendronate (FOSAMAX) 70 MG tablet Take 1 tablet by mouth once daily.      amitriptyline (ELAVIL) 50 MG tablet Take 1 tablet by mouth every evening.       atorvastatin (LIPITOR) 40 MG tablet Take 1 tablet by mouth every evening.       escitalopram oxalate (LEXAPRO) 20 MG tablet Take 1 tablet by mouth every evening.       fish oil-omega-3 fatty acids 300-1,000 mg capsule Take 2 g by mouth once daily.      JANUMET  mg per tablet Take 1 tablet by mouth 2 (two) times daily with meals.       JARDIANCE 10 mg Tab       lisinopril (PRINIVIL,ZESTRIL) 20 MG tablet Take 1 tablet by mouth once daily.      multivitamin capsule Take 1 capsule by mouth once daily.      vitamin E 400 UNIT capsule Take 400 Units by mouth once daily.      cetirizine (ZYRTEC) 10 MG tablet Take 1 tablet (10 mg total) by mouth once daily.  Qty: 30 tablet, Refills: 0      pioglitazone (ACTOS) 30 MG tablet Take 1 tablet by mouth once daily.             Discharge Procedure Orders  Diet general     Activity as tolerated     Shower on day dressing removed (No bath)   Order Comments: Remove clear bandage and non-stick gauze in 48 hours. White tape strips to remain in place. Will fall off on their own. If not, we will remove them in clinic in 2 weeks. After 48 hours, no dressing required. May shower with soap and water. Dab dry. Do not scrub vigorously. Do not submerge incisions for 2 weeks.     Call MD for:  increased confusion or weakness     Call MD for:  persistent dizziness, light-headedness, or visual disturbances     Call MD for:  worsening rash     Call MD for:  severe persistent headache     Call MD for:  difficulty breathing or increased cough     Call MD for:  redness,  tenderness, or signs of infection (pain, swelling, redness, odor or green/yellow discharge around incision site)     Call MD for:  severe uncontrolled pain     Call MD for:  persistent nausea and vomiting or diarrhea     Call MD for:  temperature >100.4     Remove dressing in 48 hours       Follow-up Information     Huy Dunlap MD In 2 weeks.    Specialty:  General Surgery  Why:  s/p WLE with SLNB  Contact information:  Vilma Momin sasha  [facility] [id number] [phone]

## 2017-08-16 NOTE — INTERVAL H&P NOTE
The patient has been examined and the H&P has been reviewed:    No acute interval changes.    Anesthesia/Surgery risks, benefits and alternative options discussed and understood by patient/family.          Active Hospital Problems    Diagnosis  POA    Malignant melanoma [C43.9]  Yes      Resolved Hospital Problems    Diagnosis Date Resolved POA   No resolved problems to display.

## 2017-08-17 VITALS
TEMPERATURE: 98 F | RESPIRATION RATE: 18 BRPM | HEART RATE: 86 BPM | SYSTOLIC BLOOD PRESSURE: 97 MMHG | HEIGHT: 64 IN | WEIGHT: 187 LBS | BODY MASS INDEX: 31.92 KG/M2 | OXYGEN SATURATION: 99 % | DIASTOLIC BLOOD PRESSURE: 52 MMHG

## 2017-08-18 NOTE — ANESTHESIA POSTPROCEDURE EVALUATION
"Anesthesia Post Evaluation    Patient: Etta Irwin    Procedure(s) Performed: Procedure(s) (LRB):  EXCISION-WIDE LOCAL  1 cm Right Lateral Thigh (Right)  BIOPSY-LYMPH NODE-SENTINEL right groin (Right)  INJECTION-NODE-SENTINEL (Right)    Final Anesthesia Type: general  Patient location during evaluation: PACU  Patient participation: Yes- Able to Participate  Level of consciousness: awake and alert  Post-procedure vital signs: reviewed and stable  Pain management: adequate  Airway patency: patent  PONV status at discharge: No PONV  Anesthetic complications: no      Cardiovascular status: stable  Respiratory status: unassisted, spontaneous ventilation and room air  Hydration status: euvolemic  Follow-up not needed.        Visit Vitals  BP (!) 97/52   Pulse 86   Temp 36.8 °C (98.3 °F) (Skin)   Resp 18   Ht 5' 4" (1.626 m)   Wt 84.8 kg (187 lb)   SpO2 99%   Breastfeeding? No   BMI 32.10 kg/m²       Pain/Danitza Score: No Data Recorded      "

## 2017-08-23 ENCOUNTER — PATIENT MESSAGE (OUTPATIENT)
Dept: SURGERY | Facility: CLINIC | Age: 57
End: 2017-08-23

## 2017-08-31 ENCOUNTER — OFFICE VISIT (OUTPATIENT)
Dept: SURGERY | Facility: CLINIC | Age: 57
End: 2017-08-31
Payer: COMMERCIAL

## 2017-08-31 VITALS
WEIGHT: 188.81 LBS | DIASTOLIC BLOOD PRESSURE: 55 MMHG | TEMPERATURE: 98 F | HEART RATE: 80 BPM | SYSTOLIC BLOOD PRESSURE: 109 MMHG | BODY MASS INDEX: 32.23 KG/M2 | HEIGHT: 64 IN

## 2017-08-31 DIAGNOSIS — C43.71 MALIGNANT MELANOMA OF RIGHT LOWER EXTREMITY INCLUDING HIP: Primary | ICD-10-CM

## 2017-08-31 PROCEDURE — 99999 PR PBB SHADOW E&M-EST. PATIENT-LVL III: CPT | Mod: PBBFAC,,, | Performed by: SURGERY

## 2017-08-31 PROCEDURE — 99024 POSTOP FOLLOW-UP VISIT: CPT | Mod: S$GLB,,, | Performed by: SURGERY

## 2017-08-31 NOTE — MEDICAL/APP STUDENT
Mrs. Irwin is a 58 yo F with hx of malignant melanoma of right thigh. She is 2 weeks s/p wide local excision with sentinel lymph node biopsy.    Subjective: Pt is doing well with no complaints. Reports no lower limb edema. Incision sites are healing appropriately.    Objective: Right thigh incision appears clean and dry and well-healed. Right groin incision is clean dry and well-healed. No right lower limb edema present on exam.     Lymph node pathology was negative for metastatic melanoma, excisional margins negative with no residual melanocytic neoplasm present - 8/23/17    Assessment: Pt s/p wide local excision with sentinel lymph node biopsy progressing well with no complications or concerns.    Plan: Surveillance by dermatology.

## 2017-08-31 NOTE — PROGRESS NOTES
Mrs. Irwin is a 58 yo F with hx of malignant melanoma of right thigh. She is 2 weeks s/p 1 cm wide local excision with sentinel lymph node biopsy of right groin.     Subjective: Pt is doing well with no complaints. Reports no lower limb edema. Incision sites are healing appropriately.     Objective: Right thigh incision appears clean and dry and well-healed. Right groin incision is clean dry and well-healed. No right lower limb edema present on exam.      Lymph node pathology was negative for metastatic melanoma, excisional margins negative with no residual melanocytic neoplasm present - 8/23/17     Assessment: Pt s/p wide local excision with sentinel lymph node biopsy progressing well with no complications or concerns.     Plan: Surveillance by dermatology.    I have personally taken the history and examined this patient and agree with the note as stated above.    Original pathology on biopsy revealed:  PATHOLOGY:  Skin lesion punch biopsy (7/27/17): Malignant melanoma, superficial spreading type, 0.66 mm in thickness. The lesion focally extends to the peripheral margins. Morales level IV. Mitotic counts 3/mm2. No ulceration. No perineural invasion. No microscopic satellitosis. Moderate tumor infiltrating lymphocytes. No associated melanocytic nevus.        ASSESSMENT/PLAN:  57 y.o. female with newly diagnosed melanoma (T1b, 3/mm2, superficial spreading)    There was no residual invasive melanoma of the right lateral thigh with the the right groin SLNB being negative.  All incision sites are healing appropriately without signs of infection.  Patient will not require any further therapy for the early stage IB melanoma.  She will f/u with me prn.  She will f/u with her dermatologist Dr. Missy Lomeli every 3 months for whole body skin screening and surveillance exams.  A copy of the final pathology report was discussed with her and provided to her.

## 2017-08-31 NOTE — LETTER
[facility]  [address]  [place] [address]  Phone: [phone]  Fax: [phone] August 31, 2017      Jessica Coller [facility], MD  [address]  [place] [address]    Patient: Etta Irwin   MR Number: [id number]   YOB: 1960   Date of Visit: 8/31/2017     Dear Dr. [facility]:    Thank you for referring Etta Irwin to me for evaluation. Attached you will find relevant portions of my assessment and plan of care.    Etta is a 57-year-old female with newly diagnosed melanoma (T1b, 3/mm2, superficial spreading).       There was no residual invasive melanoma of the right lateral thigh with the the right groin SLNB being negative.    All incision sites are healing appropriately without signs of infection. Patient will not require any further therapy for the early stage IB melanoma.      She will f/u with me prn.  She will f/u with her dermatologist Dr. Missy Lomeli every 3 months for whole body skin screening and surveillance exams.  A copy of the final pathology report was discussed with her and provided to her.    If you have questions, please do not hesitate to call me. I look forward to following Etta Irwin along with you.    Sincerely,      Huy Dunlap MD  Medical Director, [facility] [place]  Section of General and Oncologic Surgery  [facility]    RLC/hcr    CC  Missy Lomeli MD

## 2017-08-31 NOTE — LETTER
August 31, 2017        Missy Lomeli MD  [address] [place] [address]  [place] [address]             [facility]  [address]  [facility] [address]  Phone: [phone]  Fax: [phone]   Patient: Etta Irwin   MR Number: [id number]   YOB: 1960   Date of Visit: 8/31/2017       Dear Dr. Lomeli:    Thank you for referring Etta Irwin to me for evaluation. Attached you will find relevant portions of my assessment and plan of care.    If you have questions, please do not hesitate to call me. I look forward to following Etta Irwin along with you.    Sincerely,      Huy Dunlap MD            CC  No Recipients    Enclosure

## 2017-10-26 ENCOUNTER — OFFICE VISIT (OUTPATIENT)
Dept: OBSTETRICS AND GYNECOLOGY | Facility: CLINIC | Age: 57
End: 2017-10-26
Payer: COMMERCIAL

## 2017-10-26 VITALS
SYSTOLIC BLOOD PRESSURE: 126 MMHG | DIASTOLIC BLOOD PRESSURE: 70 MMHG | HEIGHT: 64 IN | BODY MASS INDEX: 33.05 KG/M2 | WEIGHT: 193.56 LBS

## 2017-10-26 DIAGNOSIS — Z01.419 WELL WOMAN EXAM WITH ROUTINE GYNECOLOGICAL EXAM: Primary | ICD-10-CM

## 2017-10-26 DIAGNOSIS — Z12.4 ROUTINE PAPANICOLAOU SMEAR: ICD-10-CM

## 2017-10-26 PROCEDURE — 99396 PREV VISIT EST AGE 40-64: CPT | Mod: S$GLB,,, | Performed by: OBSTETRICS & GYNECOLOGY

## 2017-10-26 PROCEDURE — 88175 CYTOPATH C/V AUTO FLUID REDO: CPT

## 2017-10-26 PROCEDURE — 99999 PR PBB SHADOW E&M-EST. PATIENT-LVL II: CPT | Mod: PBBFAC,,, | Performed by: OBSTETRICS & GYNECOLOGY

## 2017-10-26 NOTE — PROGRESS NOTES
"HPI:   57 y.o.   OB History      Para Term  AB Living    2 2 2     2    SAB TAB Ectopic Multiple Live Births                    No LMP recorded. Patient is postmenopausal.    Patient is  here for her annual gynecologic exam.  She has no complaints.     ROS:  GENERAL: No fever, chills, fatigability or weight loss.  SKIN: No rashes, itching or changes in color or texture of skin.  HEAD: No headaches or recent head trauma.  EYES: Visual acuity fine. No photophobia, ocular pain or diplopia.  EARS: Denies ear pain, discharge or vertigo.  NOSE: No loss of smell, no epistaxis or postnasal drip.  MOUTH & THROAT: No hoarseness or change in voice. No excessive gum bleeding.  NODES: Denies swollen glands.  CHEST: Denies LOPEZ, cyanosis, wheezing, cough and sputum production.  CARDIOVASCULAR: Denies chest pain, PND, orthopnea or reduced exercise tolerance.  ABDOMEN: Appetite fine. No weight loss. Denies diarrhea, abdominal pain, hematemesis or blood in stool.  URINARY: No flank pain, dysuria or hematuria.  PERIPHERAL VASCULAR: No claudication or cyanosis.  MUSCULOSKELETAL: No joint stiffness or swelling. Denies back pain.  NEUROLOGIC: No history of seizures, paralysis, alteration of gait or coordination.    PE:   /70   Ht 5' 4" (1.626 m)   Wt 87.8 kg (193 lb 9 oz)   BMI 33.23 kg/m²   APPEARANCE: Well nourished, well developed, in no acute distress.  NECK: Neck symmetric without masses or thyromegaly.  BREASTS: Symmetrical, no skin changes or visible lesions. No palpable masses, nipple discharge or adenopathy bilaterally.  ABDOMEN: Flat. Soft. No tenderness or masses. No hepatosplenomegaly. No hernias. No CVA tenderness.  VULVA: No lesions. Normal female genitalia.  URETHRAL MEATUS: Normal size and location, no lesions, no prolapse.  URETHRA: No masses, tenderness, prolapse or scarring.  VAGINA: Moist and well rugated, no discharge, no significant cystocele or rectocele.  CERVIX: No lesions and discharge. " PAP done.  UTERUS: Normal size, regular shape, mobile, non-tender, bladder base nontender.  ADNEXA: No masses, tenderness or CDS nodularity.  ANUS PERINEUM: Normal.    PROCEDURES:  Pap smear    Assessment:  Normal Gynecologic Exam    Plan:  Mammogram and Colonoscopy if indicated by current recommendations.  Return to clinic in one year or for any problems or complaints.  mp gyn co, no bleeding

## 2018-11-20 ENCOUNTER — OFFICE VISIT (OUTPATIENT)
Dept: OBSTETRICS AND GYNECOLOGY | Facility: CLINIC | Age: 58
End: 2018-11-20
Payer: COMMERCIAL

## 2018-11-20 VITALS
HEIGHT: 64 IN | BODY MASS INDEX: 33.29 KG/M2 | SYSTOLIC BLOOD PRESSURE: 120 MMHG | WEIGHT: 195 LBS | DIASTOLIC BLOOD PRESSURE: 84 MMHG

## 2018-11-20 DIAGNOSIS — Z01.419 WELL WOMAN EXAM WITH ROUTINE GYNECOLOGICAL EXAM: Primary | ICD-10-CM

## 2018-11-20 DIAGNOSIS — Z12.4 ROUTINE PAPANICOLAOU SMEAR: ICD-10-CM

## 2018-11-20 PROCEDURE — 99396 PREV VISIT EST AGE 40-64: CPT | Mod: S$GLB,,, | Performed by: OBSTETRICS & GYNECOLOGY

## 2018-11-20 PROCEDURE — 99999 PR PBB SHADOW E&M-EST. PATIENT-LVL III: CPT | Mod: PBBFAC,,, | Performed by: OBSTETRICS & GYNECOLOGY

## 2018-11-20 PROCEDURE — 88175 CYTOPATH C/V AUTO FLUID REDO: CPT

## 2018-11-20 RX ORDER — METFORMIN HYDROCHLORIDE 1000 MG/1
1000 TABLET ORAL 2 TIMES DAILY WITH MEALS
Refills: 0 | COMMUNITY
Start: 2018-09-04

## 2018-11-20 NOTE — PROGRESS NOTES
"HPI:   58 y.o.   OB History      Para Term  AB Living    2 2 2     2    SAB TAB Ectopic Multiple Live Births                    No LMP recorded. Patient is postmenopausal.    Patient is  here for her annual gynecologic exam.  She has no complaints.     ROS:  GENERAL: No fever, chills, fatigability or weight loss.  SKIN: No rashes, itching or changes in color or texture of skin.  HEAD: No headaches or recent head trauma.  EYES: Visual acuity fine. No photophobia, ocular pain or diplopia.  EARS: Denies ear pain, discharge or vertigo.  NOSE: No loss of smell, no epistaxis or postnasal drip.  MOUTH & THROAT: No hoarseness or change in voice. No excessive gum bleeding.  NODES: Denies swollen glands.  CHEST: Denies LOPEZ, cyanosis, wheezing, cough and sputum production.  CARDIOVASCULAR: Denies chest pain, PND, orthopnea or reduced exercise tolerance.  ABDOMEN: Appetite fine. No weight loss. Denies diarrhea, abdominal pain, hematemesis or blood in stool.  URINARY: No flank pain, dysuria or hematuria.  PERIPHERAL VASCULAR: No claudication or cyanosis.  MUSCULOSKELETAL: No joint stiffness or swelling. Denies back pain.  NEUROLOGIC: No history of seizures, paralysis, alteration of gait or coordination.    PE:   /84   Ht 5' 4" (1.626 m)   Wt 88.5 kg (194 lb 16 oz)   BMI 33.47 kg/m²   APPEARANCE: Well nourished, well developed, in no acute distress.  NECK: Neck symmetric without masses or thyromegaly.  BREASTS: Symmetrical, no skin changes or visible lesions. No palpable masses, nipple discharge or adenopathy bilaterally.  ABDOMEN: Flat. Soft. No tenderness or masses. No hepatosplenomegaly. No hernias. No CVA tenderness.  VULVA: No lesions. Normal female genitalia.  URETHRAL MEATUS: Normal size and location, no lesions, no prolapse.  URETHRA: No masses, tenderness, prolapse or scarring.  VAGINA: Moist and well rugated, no discharge, no significant cystocele or rectocele.  CERVIX: No lesions and discharge. " PAP done.  UTERUS: Normal size, regular shape, mobile, non-tender, bladder base nontender.  ADNEXA: No masses, tenderness or CDS nodularity.  ANUS PERINEUM: Normal.    PROCEDURES:  Pap smear    Assessment:  Normal Gynecologic Exam    Plan:  Mammogram and Colonoscopy if indicated by current recommendations.  Return to clinic in one year or for any problems or complaints.  No gyn co

## 2019-06-07 ENCOUNTER — OFFICE VISIT (OUTPATIENT)
Dept: ORTHOPEDICS | Facility: CLINIC | Age: 59
End: 2019-06-07
Payer: COMMERCIAL

## 2019-06-07 ENCOUNTER — HOSPITAL ENCOUNTER (OUTPATIENT)
Dept: RADIOLOGY | Facility: HOSPITAL | Age: 59
Discharge: HOME | End: 2019-06-07
Attending: ORTHOPAEDIC SURGERY
Payer: COMMERCIAL

## 2019-06-07 ENCOUNTER — TELEPHONE (OUTPATIENT)
Dept: ORTHOPEDICS | Facility: CLINIC | Age: 59
End: 2019-06-07

## 2019-06-07 VITALS — HEIGHT: 64 IN | BODY MASS INDEX: 33.29 KG/M2 | WEIGHT: 195 LBS

## 2019-06-07 DIAGNOSIS — M17.12 PRIMARY OSTEOARTHRITIS OF LEFT KNEE: Primary | ICD-10-CM

## 2019-06-07 DIAGNOSIS — M25.562 LEFT KNEE PAIN, UNSPECIFIED CHRONICITY: ICD-10-CM

## 2019-06-07 DIAGNOSIS — M25.562 LEFT KNEE PAIN, UNSPECIFIED CHRONICITY: Primary | ICD-10-CM

## 2019-06-07 PROCEDURE — 3008F BODY MASS INDEX DOCD: CPT | Mod: CPTII,S$GLB,, | Performed by: ORTHOPAEDIC SURGERY

## 2019-06-07 PROCEDURE — 3008F PR BODY MASS INDEX (BMI) DOCUMENTED: ICD-10-PCS | Mod: CPTII,S$GLB,, | Performed by: ORTHOPAEDIC SURGERY

## 2019-06-07 PROCEDURE — 99202 PR OFFICE/OUTPT VISIT, NEW, LEVL II, 15-29 MIN: ICD-10-PCS | Mod: 25,S$GLB,, | Performed by: ORTHOPAEDIC SURGERY

## 2019-06-07 PROCEDURE — 20610 DRAIN/INJ JOINT/BURSA W/O US: CPT | Mod: LT,S$GLB,, | Performed by: ORTHOPAEDIC SURGERY

## 2019-06-07 PROCEDURE — 73564 XR KNEE COMP 4 OR MORE VIEWS LEFT: ICD-10-PCS | Mod: 26,LT,, | Performed by: INTERNAL MEDICINE

## 2019-06-07 PROCEDURE — 73564 X-RAY EXAM KNEE 4 OR MORE: CPT | Mod: 26,LT,, | Performed by: INTERNAL MEDICINE

## 2019-06-07 PROCEDURE — 20610 PR DRAIN/INJECT LARGE JOINT/BURSA: ICD-10-PCS | Mod: LT,S$GLB,, | Performed by: ORTHOPAEDIC SURGERY

## 2019-06-07 PROCEDURE — 73564 X-RAY EXAM KNEE 4 OR MORE: CPT | Mod: TC,PN,LT

## 2019-06-07 PROCEDURE — 99999 PR PBB SHADOW E&M-EST. PATIENT-LVL II: CPT | Mod: PBBFAC,,, | Performed by: ORTHOPAEDIC SURGERY

## 2019-06-07 PROCEDURE — 99999 PR PBB SHADOW E&M-EST. PATIENT-LVL II: ICD-10-PCS | Mod: PBBFAC,,, | Performed by: ORTHOPAEDIC SURGERY

## 2019-06-07 PROCEDURE — 99202 OFFICE O/P NEW SF 15 MIN: CPT | Mod: 25,S$GLB,, | Performed by: ORTHOPAEDIC SURGERY

## 2019-06-07 RX ORDER — TRIAMCINOLONE ACETONIDE 40 MG/ML
40 INJECTION, SUSPENSION INTRA-ARTICULAR; INTRAMUSCULAR
Status: COMPLETED | OUTPATIENT
Start: 2019-06-07 | End: 2019-06-07

## 2019-06-07 RX ADMIN — TRIAMCINOLONE ACETONIDE 40 MG: 40 INJECTION, SUSPENSION INTRA-ARTICULAR; INTRAMUSCULAR at 02:06

## 2019-06-07 NOTE — PROGRESS NOTES
Subjective:      Patient ID: Etta Irwin is a 59 y.o. female.    Chief Complaint: Pain and Swelling of the Left Knee    HPI     They have experienced problems with their left knee over the past 1 month. The patient denies relevant history of injury/aggravation. Pain is located medially Associated symptoms include NA.  Symptoms are aggravated by prolonged walking. They have been treated with NA.   Symptoms have recently stayed the same. Ambulation reportedly has not been impaired. Self care ADLs are not painful.     Review of Systems   Constitution: Negative for fever and weight loss.   HENT: Negative for congestion.    Eyes: Negative for visual disturbance.   Cardiovascular: Negative for chest pain.   Respiratory: Negative for shortness of breath.    Hematologic/Lymphatic: Negative for bleeding problem. Does not bruise/bleed easily.   Skin: Negative for poor wound healing.   Musculoskeletal: Positive for joint pain.   Gastrointestinal: Negative for abdominal pain.   Genitourinary: Negative for dysuria.   Neurological: Negative for seizures.   Psychiatric/Behavioral: Negative for altered mental status.   Allergic/Immunologic: Negative for persistent infections.         Objective:      Ortho/SPM Exam          Left knee    The patient is not in acute distress.   Body habitus is normal.   Sclera appear normal  No respiratory distress  The patient walks without a limp.  Resisted SLR negative.   The skin over the knee is intact.  Knee effusion 0  Tendernes is located medially to mild a degree  Range of motion- Flexion full, Extension full.   Ligament exam:   MCL 1+   Lachman intact              Post sag intact    LCL intact  Patellar apprehension negative.  Popliteal cyst negative  Patellar crepitation absent.  Flexion/pinch negative.  Pulses DP present, PT present.  Motor normal 5/5 strength in all tested muscle groups.   Sensory normal.      I reviewed the relevant radiographic images for the patient's condition:   Today's knee films show mild to moderate medial narrowing    Assessment:       Encounter Diagnosis   Name Primary?    Primary osteoarthritis of left knee Yes        structurally speaking the process is mild to moderate.  The patient's symptoms are relatively recent and her impairment is mild.  Plan:       Etta was seen today for pain and swelling.    Diagnoses and all orders for this visit:    Primary osteoarthritis of left knee        I explained my diagnostic impression and the reasoning behind it in detail, using layman's terms.  Models and/or pictures were used to help in the explanation.    Injection recommended and consent given    Over-the-counter analgesics recommended    Icing recommended    Weight loss recommended    Appropriate modification of exercises explained

## 2019-09-13 ENCOUNTER — OFFICE VISIT (OUTPATIENT)
Dept: ORTHOPEDICS | Facility: CLINIC | Age: 59
End: 2019-09-13
Payer: COMMERCIAL

## 2019-09-13 VITALS — BODY MASS INDEX: 33.31 KG/M2 | HEIGHT: 64 IN | WEIGHT: 195.13 LBS

## 2019-09-13 DIAGNOSIS — M17.12 PRIMARY OSTEOARTHRITIS OF LEFT KNEE: Primary | ICD-10-CM

## 2019-09-13 PROCEDURE — 99213 OFFICE O/P EST LOW 20 MIN: CPT | Mod: 25,S$GLB,, | Performed by: ORTHOPAEDIC SURGERY

## 2019-09-13 PROCEDURE — 20610 PR DRAIN/INJECT LARGE JOINT/BURSA: ICD-10-PCS | Mod: LT,S$GLB,, | Performed by: ORTHOPAEDIC SURGERY

## 2019-09-13 PROCEDURE — 3008F PR BODY MASS INDEX (BMI) DOCUMENTED: ICD-10-PCS | Mod: CPTII,S$GLB,, | Performed by: ORTHOPAEDIC SURGERY

## 2019-09-13 PROCEDURE — 99213 PR OFFICE/OUTPT VISIT, EST, LEVL III, 20-29 MIN: ICD-10-PCS | Mod: 25,S$GLB,, | Performed by: ORTHOPAEDIC SURGERY

## 2019-09-13 PROCEDURE — 99999 PR PBB SHADOW E&M-EST. PATIENT-LVL II: CPT | Mod: PBBFAC,,, | Performed by: ORTHOPAEDIC SURGERY

## 2019-09-13 PROCEDURE — 3008F BODY MASS INDEX DOCD: CPT | Mod: CPTII,S$GLB,, | Performed by: ORTHOPAEDIC SURGERY

## 2019-09-13 PROCEDURE — 20610 DRAIN/INJ JOINT/BURSA W/O US: CPT | Mod: LT,S$GLB,, | Performed by: ORTHOPAEDIC SURGERY

## 2019-09-13 PROCEDURE — 99999 PR PBB SHADOW E&M-EST. PATIENT-LVL II: ICD-10-PCS | Mod: PBBFAC,,, | Performed by: ORTHOPAEDIC SURGERY

## 2019-09-13 RX ORDER — TRIAMCINOLONE ACETONIDE 40 MG/ML
40 INJECTION, SUSPENSION INTRA-ARTICULAR; INTRAMUSCULAR
Status: COMPLETED | OUTPATIENT
Start: 2019-09-13 | End: 2019-09-13

## 2019-09-13 RX ADMIN — TRIAMCINOLONE ACETONIDE 40 MG: 40 INJECTION, SUSPENSION INTRA-ARTICULAR; INTRAMUSCULAR at 10:09

## 2019-09-13 NOTE — PROGRESS NOTES
Subjective:      Patient ID: Etta Irwin is a 59 y.o. female.    Chief Complaint: Pain (left knee)    HPI follow-up for osteoarthritis.  Patient reports that injection last visit helped quite a bit, but is wearing off.  She has increasing medial pain.  Review of Systems   Constitution: Negative for fever and weight loss.   HENT: Negative for congestion.    Eyes: Negative for visual disturbance.   Cardiovascular: Negative for chest pain.   Respiratory: Negative for shortness of breath.    Hematologic/Lymphatic: Negative for bleeding problem. Does not bruise/bleed easily.   Skin: Negative for poor wound healing.   Musculoskeletal: Positive for joint pain.   Gastrointestinal: Negative for abdominal pain.   Genitourinary: Negative for dysuria.   Neurological: Negative for seizures.   Psychiatric/Behavioral: Negative for altered mental status.   Allergic/Immunologic: Negative for persistent infections.         Objective:      Ortho/SPM Exam      Left knee    The patient is not in acute distress.   Body habitus is normal.   Sclera appear normal  No respiratory distress  The patient walks without a limp.  Resisted SLR negative.   The skin over the knee is intact.  Knee effusion trace  Tendernes is located absent.  Range of motion- Flexion full, Extension full.   Ligament exam:   MCL 1+   Lachman intact              Post sag intact    LCL intact  Patellar apprehension negative.  Popliteal cyst negative  Patellar crepitation absent.  Pulses DP present, PT present.  Motor normal 5/5 strength in all tested muscle groups.   Sensory normal.                Assessment:       Encounter Diagnosis   Name Primary?    Primary osteoarthritis of left knee Yes        The patient has structurally moderate osteoarthritis of her left knee. While she probably is a candidate for arthroplasty, her overall symptomatology is such that she does not wish to considered at this time.          Plan:       Etta was seen today for  pain.    Diagnoses and all orders for this visit:    Primary osteoarthritis of left knee          I explained my diagnostic impression and the reasoning behind it in detail, using layman's terms.  Models and/or pictures were used to help in the explanation.    Injection requested and consent given    Hinged brace applied with usage instructions    Periodic injections in physician assistant clinical reasonable.  If this does not control symptoms, arthroplasty would be considered.    She needs to see me annually for radiographs.

## 2019-09-18 ENCOUNTER — TELEPHONE (OUTPATIENT)
Dept: ORTHOPEDICS | Facility: CLINIC | Age: 59
End: 2019-09-18

## 2019-09-18 NOTE — TELEPHONE ENCOUNTER
----- Message from Ngoc Adame sent at 9/18/2019  2:03 PM CDT -----  Contact: patient  Patient called to speak with your office about getting a smaller foot brace as she cannot get it tight.    Please call [phone] to discuss today.

## 2019-09-30 ENCOUNTER — TELEPHONE (OUTPATIENT)
Dept: ORTHOPEDICS | Facility: CLINIC | Age: 59
End: 2019-09-30

## 2019-09-30 ENCOUNTER — TELEPHONE (OUTPATIENT)
Dept: OBSTETRICS AND GYNECOLOGY | Facility: CLINIC | Age: 59
End: 2019-09-30

## 2019-09-30 DIAGNOSIS — Z12.39 SCREENING BREAST EXAMINATION: Primary | ICD-10-CM

## 2019-09-30 NOTE — TELEPHONE ENCOUNTER
Returned call to pt she was trying to come in sooner than the 11th. I offered her an appt with Violet for this thurs. she is unable to come in so she is going to keep her appt for next week.

## 2019-09-30 NOTE — TELEPHONE ENCOUNTER
----- Message from Alisson Reddy sent at 9/30/2019 10:13 AM CDT -----  Contact: Self  Pt is calling to speak with Staff regarding knee pain.    She can be reached at [phone].    Thank you.

## 2019-09-30 NOTE — TELEPHONE ENCOUNTER
----- Message from Alisson Reddy sent at 9/30/2019 10:14 AM CDT -----  Contact: Self  Pt is calling to speak with Staff regarding Orders for a Mammogram.  She is requesting a returned call to [phone].    Thank you.

## 2019-10-04 ENCOUNTER — HOSPITAL ENCOUNTER (OUTPATIENT)
Dept: RADIOLOGY | Facility: HOSPITAL | Age: 59
Discharge: HOME | End: 2019-10-04
Attending: OBSTETRICS & GYNECOLOGY
Payer: COMMERCIAL

## 2019-10-04 DIAGNOSIS — Z12.39 SCREENING BREAST EXAMINATION: ICD-10-CM

## 2019-10-04 PROCEDURE — 77067 MAMMO DIGITAL SCREENING BILAT WITH TOMOSYNTHESIS_CAD: ICD-10-PCS | Mod: 26,,, | Performed by: RADIOLOGY

## 2019-10-04 PROCEDURE — 77067 SCR MAMMO BI INCL CAD: CPT | Mod: TC

## 2019-10-04 PROCEDURE — 77063 BREAST TOMOSYNTHESIS BI: CPT | Mod: 26,,, | Performed by: RADIOLOGY

## 2019-10-04 PROCEDURE — 77067 SCR MAMMO BI INCL CAD: CPT | Mod: 26,,, | Performed by: RADIOLOGY

## 2019-10-04 PROCEDURE — 77063 MAMMO DIGITAL SCREENING BILAT WITH TOMOSYNTHESIS_CAD: ICD-10-PCS | Mod: 26,,, | Performed by: RADIOLOGY

## 2019-10-07 ENCOUNTER — TELEPHONE (OUTPATIENT)
Dept: RADIOLOGY | Facility: HOSPITAL | Age: 59
End: 2019-10-07

## 2019-10-11 ENCOUNTER — OFFICE VISIT (OUTPATIENT)
Dept: ORTHOPEDICS | Facility: CLINIC | Age: 59
End: 2019-10-11
Payer: COMMERCIAL

## 2019-10-11 VITALS — WEIGHT: 195 LBS | BODY MASS INDEX: 33.29 KG/M2 | HEIGHT: 64 IN

## 2019-10-11 DIAGNOSIS — M17.12 PRIMARY OSTEOARTHRITIS OF LEFT KNEE: Primary | ICD-10-CM

## 2019-10-11 PROCEDURE — 3008F BODY MASS INDEX DOCD: CPT | Mod: CPTII,S$GLB,, | Performed by: ORTHOPAEDIC SURGERY

## 2019-10-11 PROCEDURE — 99214 OFFICE O/P EST MOD 30 MIN: CPT | Mod: S$GLB,,, | Performed by: ORTHOPAEDIC SURGERY

## 2019-10-11 PROCEDURE — 99999 PR PBB SHADOW E&M-EST. PATIENT-LVL III: ICD-10-PCS | Mod: PBBFAC,,, | Performed by: ORTHOPAEDIC SURGERY

## 2019-10-11 PROCEDURE — 3008F PR BODY MASS INDEX (BMI) DOCUMENTED: ICD-10-PCS | Mod: CPTII,S$GLB,, | Performed by: ORTHOPAEDIC SURGERY

## 2019-10-11 PROCEDURE — 99999 PR PBB SHADOW E&M-EST. PATIENT-LVL III: CPT | Mod: PBBFAC,,, | Performed by: ORTHOPAEDIC SURGERY

## 2019-10-11 PROCEDURE — 99214 PR OFFICE/OUTPT VISIT, EST, LEVL IV, 30-39 MIN: ICD-10-PCS | Mod: S$GLB,,, | Performed by: ORTHOPAEDIC SURGERY

## 2019-10-11 RX ORDER — NAPROXEN 500 MG/1
500 TABLET ORAL 2 TIMES DAILY WITH MEALS
Qty: 60 TABLET | Refills: 1 | Status: SHIPPED | OUTPATIENT
Start: 2019-10-11 | End: 2019-12-10 | Stop reason: SDUPTHER

## 2019-10-11 RX ORDER — CHOLECALCIFEROL (VITAMIN D3) 25 MCG
1000 TABLET ORAL DAILY
COMMUNITY

## 2019-10-11 RX ORDER — LEVOTHYROXINE SODIUM 50 UG/1
50 TABLET ORAL
Refills: 0 | COMMUNITY
Start: 2019-09-04

## 2019-10-11 NOTE — PROGRESS NOTES
Subjective:      Patient ID: Etta Irwin is a 59 y.o. female.    Chief Complaint: Follow-up of the Left Knee    HPI the patient reports that her knee is getting worse.  For at least 1 month she has had increasing medial pain with all ambulation.  Recent medication is not been helpful.  Review of Systems   Constitution: Negative for fever and weight loss.   HENT: Negative for congestion.    Eyes: Negative for visual disturbance.   Cardiovascular: Negative for chest pain.   Respiratory: Negative for shortness of breath.    Hematologic/Lymphatic: Negative for bleeding problem. Does not bruise/bleed easily.   Skin: Negative for poor wound healing.   Musculoskeletal: Positive for joint pain.   Gastrointestinal: Negative for abdominal pain.   Genitourinary: Negative for dysuria.   Neurological: Negative for seizures.   Psychiatric/Behavioral: Negative for altered mental status.   Allergic/Immunologic: Negative for persistent infections.         Objective:      Ortho/SPM Exam      Left knee     The patient is not in acute distress.   Body habitus is normal.   Sclera appear normal  No respiratory distress  The patient walks with a limp.  Resisted SLR negative.   The skin over the knee is intact.  Knee effusion trace  Tendernes is located absent.  Range of motion- Flexion full, Extension full.   Ligament exam:              MCL 1+              Lachman intact              Post sag intact              LCL intact  Patellar apprehension negative.  Popliteal cyst negative  Patellar crepitation absent.  Pulses DP present, PT present.  Motor normal 5/5 strength in all tested muscle groups.   Sensory normal.               Assessment:       Encounter Diagnosis   Name Primary?    Primary osteoarthritis of left knee Yes        This represents an acute flare of underlying osteoarthritis which appears to be structurally moderate.  There may be an element of bone marrow edema.          Plan:       Etta was seen today for  follow-up.    Diagnoses and all orders for this visit:    Primary osteoarthritis of left knee          I explained my diagnostic impression and the reasoning behind it in detail, using layman's terms.  Models and/or pictures were used to help in the explanation.    I explained that if bone marrow edema is causing the pain in may be reversible.  On the other hand, if the articular processes progressing surgical intervention may well require consideration.    Given the above I recommend the patient undergo MRI, hinged knee brace was applied with usage instructions.    Naprosyn    I explained to the patient that I am providing a prescription for anti-inflammatory medication.  I warned the patient that it should not be taken with other anti-inflammatory medications including over-the-counter products containing ibuprofen and naproxen.  I advised them to consult their primary physician or pharmacist if there is any question about this in the future.  I discussed the possible side effects including cardiovascular issues, renal issues and gastrointestinal problems.  I advised the patient to discontinue the medication should they develop persistent symptoms of dyspepsia.    I also explained that should they elect to continue this medication long-term, that is beyond the prescription that I provide at this time, they should contact their primary physician for refills and appropriate monitoring.    I explained the potential role of surgery in the treatment of this condition to the patient.  They understand that if nonsurgical measures do not adequately control symptoms, surgery will be considered in the future.

## 2019-10-16 ENCOUNTER — TELEPHONE (OUTPATIENT)
Dept: RADIOLOGY | Facility: HOSPITAL | Age: 59
End: 2019-10-16

## 2019-10-22 ENCOUNTER — TELEPHONE (OUTPATIENT)
Dept: RADIOLOGY | Facility: HOSPITAL | Age: 59
End: 2019-10-22

## 2019-10-28 ENCOUNTER — TELEPHONE (OUTPATIENT)
Dept: RADIOLOGY | Facility: HOSPITAL | Age: 59
End: 2019-10-28

## 2019-10-29 ENCOUNTER — HOSPITAL ENCOUNTER (OUTPATIENT)
Dept: RADIOLOGY | Facility: HOSPITAL | Age: 59
Discharge: HOME | End: 2019-10-29
Attending: ORTHOPAEDIC SURGERY
Payer: COMMERCIAL

## 2019-10-29 DIAGNOSIS — M17.12 PRIMARY OSTEOARTHRITIS OF LEFT KNEE: ICD-10-CM

## 2019-10-29 PROCEDURE — 73721 MRI JNT OF LWR EXTRE W/O DYE: CPT | Mod: 26,LT,, | Performed by: RADIOLOGY

## 2019-10-29 PROCEDURE — 73721 MRI KNEE WITHOUT CONTRAST LEFT: ICD-10-PCS | Mod: 26,LT,, | Performed by: RADIOLOGY

## 2019-10-29 PROCEDURE — 73721 MRI JNT OF LWR EXTRE W/O DYE: CPT | Mod: TC,LT

## 2019-10-31 ENCOUNTER — OFFICE VISIT (OUTPATIENT)
Dept: ORTHOPEDICS | Facility: CLINIC | Age: 59
End: 2019-10-31
Payer: COMMERCIAL

## 2019-10-31 ENCOUNTER — HOSPITAL ENCOUNTER (OUTPATIENT)
Dept: RADIOLOGY | Facility: HOSPITAL | Age: 59
Discharge: HOME | End: 2019-10-31
Attending: OBSTETRICS & GYNECOLOGY
Payer: COMMERCIAL

## 2019-10-31 VITALS — WEIGHT: 195 LBS | HEIGHT: 64 IN | BODY MASS INDEX: 33.29 KG/M2

## 2019-10-31 DIAGNOSIS — M17.12 PRIMARY OSTEOARTHRITIS OF LEFT KNEE: Primary | ICD-10-CM

## 2019-10-31 DIAGNOSIS — R92.8 ABNORMAL MAMMOGRAM: ICD-10-CM

## 2019-10-31 PROCEDURE — 77065 DX MAMMO INCL CAD UNI: CPT | Mod: TC,LT

## 2019-10-31 PROCEDURE — 76642 US BREAST LEFT LIMITED: ICD-10-PCS | Mod: 26,LT,, | Performed by: RADIOLOGY

## 2019-10-31 PROCEDURE — 77061 MAMMO DIGITAL DIAGNOSTIC LEFT WITH TOMOSYNTHESIS_CAD: ICD-10-PCS | Mod: 26,LT,, | Performed by: RADIOLOGY

## 2019-10-31 PROCEDURE — 99213 PR OFFICE/OUTPT VISIT, EST, LEVL III, 20-29 MIN: ICD-10-PCS | Mod: S$GLB,,, | Performed by: ORTHOPAEDIC SURGERY

## 2019-10-31 PROCEDURE — 76642 ULTRASOUND BREAST LIMITED: CPT | Mod: 26,LT,, | Performed by: RADIOLOGY

## 2019-10-31 PROCEDURE — 99999 PR PBB SHADOW E&M-EST. PATIENT-LVL II: CPT | Mod: PBBFAC,,, | Performed by: ORTHOPAEDIC SURGERY

## 2019-10-31 PROCEDURE — 76642 ULTRASOUND BREAST LIMITED: CPT | Mod: TC,LT

## 2019-10-31 PROCEDURE — 99213 OFFICE O/P EST LOW 20 MIN: CPT | Mod: S$GLB,,, | Performed by: ORTHOPAEDIC SURGERY

## 2019-10-31 PROCEDURE — 3008F BODY MASS INDEX DOCD: CPT | Mod: CPTII,S$GLB,, | Performed by: ORTHOPAEDIC SURGERY

## 2019-10-31 PROCEDURE — 3008F PR BODY MASS INDEX (BMI) DOCUMENTED: ICD-10-PCS | Mod: CPTII,S$GLB,, | Performed by: ORTHOPAEDIC SURGERY

## 2019-10-31 PROCEDURE — 77061 BREAST TOMOSYNTHESIS UNI: CPT | Mod: TC,LT

## 2019-10-31 PROCEDURE — 77065 MAMMO DIGITAL DIAGNOSTIC LEFT WITH TOMOSYNTHESIS_CAD: ICD-10-PCS | Mod: 26,LT,, | Performed by: RADIOLOGY

## 2019-10-31 PROCEDURE — 99999 PR PBB SHADOW E&M-EST. PATIENT-LVL II: ICD-10-PCS | Mod: PBBFAC,,, | Performed by: ORTHOPAEDIC SURGERY

## 2019-10-31 PROCEDURE — 77065 DX MAMMO INCL CAD UNI: CPT | Mod: 26,LT,, | Performed by: RADIOLOGY

## 2019-10-31 PROCEDURE — 77061 BREAST TOMOSYNTHESIS UNI: CPT | Mod: 26,LT,, | Performed by: RADIOLOGY

## 2019-10-31 NOTE — PROGRESS NOTES
Subjective:      Patient ID: Etta Irwin is a 59 y.o. female.    Chief Complaint: Follow-up of the Left Knee    HPI the patient reports that her pain is much better with the use of a brace and Naprosyn.  She has occasional medial discomfort with prolonged ambulation.  No mechanical symptoms.  Review of Systems   Constitution: Negative for fever and weight loss.   HENT: Negative for congestion.    Eyes: Negative for visual disturbance.   Cardiovascular: Negative for chest pain.   Respiratory: Negative for shortness of breath.    Hematologic/Lymphatic: Negative for bleeding problem. Does not bruise/bleed easily.   Skin: Negative for poor wound healing.   Gastrointestinal: Negative for abdominal pain.   Genitourinary: Negative for dysuria.   Neurological: Negative for seizures.   Psychiatric/Behavioral: Negative for altered mental status.   Allergic/Immunologic: Negative for persistent infections.         Objective:      Ortho/SPM Exam      Left knee    The patient is not in acute distress.   Body habitus is normal.   Sclera appear normal  No respiratory distress  The patient walks  without  a limp.  Resisted SLR negative.   The skin over the knee is intact.  Knee effusion trace  Tendernes is located absent.  Range of motion- Flexion full, Extension full.   Ligament exam:              MCL 1+              Lachman intact              Post sag intact              LCL intact  Patellar apprehension negative.  Popliteal cyst negative  Patellar crepitation absent.  Pulses DP present, PT present.  Motor normal 5/5 strength in all tested muscle groups.   Sensory normal.     I reviewed the relevant radiographic images for the patient's condition:  I reviewed the recent MRI results with the patient. There is focal DJD of the medial compartment without other significant internal derangement.      Assessment:       Encounter Diagnosis   Name Primary?    Primary osteoarthritis of left knee Yes        The acute flare seems to  be subsiding.          Plan:       Etta was seen today for follow-up.    Diagnoses and all orders for this visit:    Primary osteoarthritis of left knee          I explained my diagnostic impression and the reasoning behind it in detail, using layman's terms.  Models and/or pictures were used to help in the explanation.    Continue present treatment modalities    I explained the natural history of osteoarthritis in the possibility of future recurring and progressing symptoms with the need for further treatment.

## 2019-12-10 DIAGNOSIS — M17.12 PRIMARY OSTEOARTHRITIS OF LEFT KNEE: ICD-10-CM

## 2019-12-10 RX ORDER — NAPROXEN 500 MG/1
TABLET ORAL
Qty: 60 TABLET | Refills: 0 | Status: SHIPPED | OUTPATIENT
Start: 2019-12-10 | End: 2020-01-17 | Stop reason: SDUPTHER

## 2020-01-17 ENCOUNTER — OFFICE VISIT (OUTPATIENT)
Dept: ORTHOPEDICS | Facility: CLINIC | Age: 60
End: 2020-01-17
Payer: COMMERCIAL

## 2020-01-17 VITALS — BODY MASS INDEX: 33.29 KG/M2 | HEIGHT: 64 IN | WEIGHT: 195 LBS

## 2020-01-17 DIAGNOSIS — M17.12 PRIMARY OSTEOARTHRITIS OF LEFT KNEE: Primary | ICD-10-CM

## 2020-01-17 PROCEDURE — 20610 DRAIN/INJ JOINT/BURSA W/O US: CPT | Mod: LT,S$GLB,, | Performed by: ORTHOPAEDIC SURGERY

## 2020-01-17 PROCEDURE — 20610 PR DRAIN/INJECT LARGE JOINT/BURSA: ICD-10-PCS | Mod: LT,S$GLB,, | Performed by: ORTHOPAEDIC SURGERY

## 2020-01-17 PROCEDURE — 99213 OFFICE O/P EST LOW 20 MIN: CPT | Mod: 25,S$GLB,, | Performed by: ORTHOPAEDIC SURGERY

## 2020-01-17 PROCEDURE — 99213 PR OFFICE/OUTPT VISIT, EST, LEVL III, 20-29 MIN: ICD-10-PCS | Mod: 25,S$GLB,, | Performed by: ORTHOPAEDIC SURGERY

## 2020-01-17 PROCEDURE — 3008F BODY MASS INDEX DOCD: CPT | Mod: CPTII,S$GLB,, | Performed by: ORTHOPAEDIC SURGERY

## 2020-01-17 PROCEDURE — 99999 PR PBB SHADOW E&M-EST. PATIENT-LVL III: ICD-10-PCS | Mod: PBBFAC,,, | Performed by: ORTHOPAEDIC SURGERY

## 2020-01-17 PROCEDURE — 3008F PR BODY MASS INDEX (BMI) DOCUMENTED: ICD-10-PCS | Mod: CPTII,S$GLB,, | Performed by: ORTHOPAEDIC SURGERY

## 2020-01-17 PROCEDURE — 99999 PR PBB SHADOW E&M-EST. PATIENT-LVL III: CPT | Mod: PBBFAC,,, | Performed by: ORTHOPAEDIC SURGERY

## 2020-01-17 RX ORDER — TRIAMCINOLONE ACETONIDE 40 MG/ML
40 INJECTION, SUSPENSION INTRA-ARTICULAR; INTRAMUSCULAR
Status: COMPLETED | OUTPATIENT
Start: 2020-01-17 | End: 2020-01-17

## 2020-01-17 RX ORDER — NAPROXEN 500 MG/1
500 TABLET ORAL 2 TIMES DAILY WITH MEALS
Qty: 60 TABLET | Refills: 2 | Status: SHIPPED | OUTPATIENT
Start: 2020-01-17 | End: 2021-09-23

## 2020-01-17 RX ADMIN — TRIAMCINOLONE ACETONIDE 40 MG: 40 INJECTION, SUSPENSION INTRA-ARTICULAR; INTRAMUSCULAR at 10:01

## 2020-01-17 NOTE — PROGRESS NOTES
Subjective:      Patient ID: Etta Irwin is a 60 y.o. female.    Chief Complaint: Follow-up of the Left Knee    HPI    The patient reports that her left knee osteoarthritis has been relatively well controlled with use of Naprosyn.  She has occasional medial discomfort that she does not fine functionally limiting.  She does request reinjection today.          Review of Systems   Constitution: Negative for fever and weight loss.   HENT: Negative for congestion.    Eyes: Negative for visual disturbance.   Cardiovascular: Negative for chest pain.   Respiratory: Negative for shortness of breath.    Hematologic/Lymphatic: Negative for bleeding problem. Does not bruise/bleed easily.   Skin: Negative for poor wound healing.   Gastrointestinal: Negative for abdominal pain.   Genitourinary: Negative for dysuria.   Neurological: Negative for seizures.   Psychiatric/Behavioral: Negative for altered mental status.   Allergic/Immunologic: Negative for persistent infections.         Objective:      Ortho/SPM Exam      Left knee     The patient is not in acute distress.   Body habitus is normal.   Sclera appear normal  No respiratory distress  The patient walks  without  a limp.  Resisted SLR negative.   The skin over the knee is intact.  Knee effusion trace  Tendernes is located absent.  Range of motion- Flexion full, Extension full.   Ligament exam:              MCL 1+              Lachman intact              Post sag intact              LCL intact  Patellar apprehension negative.  Popliteal cyst negative  Patellar crepitation absent.  Pulses DP present, PT present.  Motor normal 5/5 strength in all tested muscle groups.   Sensory normal.               Assessment:       No diagnosis found.     The condition is structurally moderate and controlled nonsurgically at this time        Plan:       There are no diagnoses linked to this encounter.      I explained my diagnostic impression and the reasoning behind it in detail,  using layman's terms.  Models and/or pictures were used to help in the explanation.    Consent given for injection    I explained to the patient that I am providing a prescription for anti-inflammatory medication.  I warned the patient that it should not be taken with other anti-inflammatory medications including over-the-counter products containing ibuprofen and naproxen.  I advised them to consult their primary physician or pharmacist if there is any question about this in the future.  I discussed the possible side effects including cardiovascular issues, renal issues and gastrointestinal problems.  I advised the patient to discontinue the medication should they develop persistent symptoms of dyspepsia.    I also explained that should they elect to continue this medication long-term, that is beyond the prescription that I provide at this time, they should contact their primary physician for refills and appropriate monitoring.    X-ray follow-up

## 2020-06-02 ENCOUNTER — HOSPITAL ENCOUNTER (OUTPATIENT)
Dept: RADIOLOGY | Facility: HOSPITAL | Age: 60
Discharge: HOME | End: 2020-06-02
Attending: OBSTETRICS & GYNECOLOGY
Payer: COMMERCIAL

## 2020-06-02 DIAGNOSIS — Z09 FOLLOW-UP EXAM, 3-6 MONTHS SINCE PREVIOUS EXAM: ICD-10-CM

## 2020-06-02 PROCEDURE — 77065 DX MAMMO INCL CAD UNI: CPT | Mod: TC,LT

## 2020-06-02 PROCEDURE — 77061 BREAST TOMOSYNTHESIS UNI: CPT | Mod: TC,LT

## 2020-06-02 PROCEDURE — 77065 MAMMO DIGITAL DIAGNOSTIC LEFT WITH TOMOSYNTHESIS_CAD: ICD-10-PCS | Mod: 26,LT,, | Performed by: RADIOLOGY

## 2020-06-02 PROCEDURE — 77065 DX MAMMO INCL CAD UNI: CPT | Mod: 26,LT,, | Performed by: RADIOLOGY

## 2020-06-02 PROCEDURE — 77061 MAMMO DIGITAL DIAGNOSTIC LEFT WITH TOMOSYNTHESIS_CAD: ICD-10-PCS | Mod: 26,LT,, | Performed by: RADIOLOGY

## 2020-06-02 PROCEDURE — 77061 BREAST TOMOSYNTHESIS UNI: CPT | Mod: 26,LT,, | Performed by: RADIOLOGY

## 2020-07-04 NOTE — PROCEDURES
Procedures     After obtaining verbal informed consent the patient's left knee was prepped aseptically and injected through an inferior lateral approach using 40 mg of triamcinolone and 1 cc of 1% plain Xylocaine.  The patient was warned about postinjection flare and how to manage it with ice, rest and over-the-counter analgesics.  They're advised to contact me for any severe, uncontrolled pain.   Abnormal Lactate: > 2

## 2020-07-07 ENCOUNTER — OFFICE VISIT (OUTPATIENT)
Dept: OBSTETRICS AND GYNECOLOGY | Facility: CLINIC | Age: 60
End: 2020-07-07
Payer: COMMERCIAL

## 2020-07-07 VITALS
WEIGHT: 192.88 LBS | DIASTOLIC BLOOD PRESSURE: 84 MMHG | SYSTOLIC BLOOD PRESSURE: 110 MMHG | BODY MASS INDEX: 33.11 KG/M2

## 2020-07-07 DIAGNOSIS — Z12.4 PAPANICOLAOU SMEAR FOR CERVICAL CANCER SCREENING: Primary | ICD-10-CM

## 2020-07-07 PROCEDURE — 88175 CYTOPATH C/V AUTO FLUID REDO: CPT

## 2020-07-07 PROCEDURE — 99999 PR PBB SHADOW E&M-EST. PATIENT-LVL III: CPT | Mod: PBBFAC,,, | Performed by: OBSTETRICS & GYNECOLOGY

## 2020-07-07 PROCEDURE — 99396 PREV VISIT EST AGE 40-64: CPT | Mod: S$GLB,,, | Performed by: OBSTETRICS & GYNECOLOGY

## 2020-07-07 PROCEDURE — 99999 PR PBB SHADOW E&M-EST. PATIENT-LVL III: ICD-10-PCS | Mod: PBBFAC,,, | Performed by: OBSTETRICS & GYNECOLOGY

## 2020-07-07 PROCEDURE — 99396 PR PREVENTIVE VISIT,EST,40-64: ICD-10-PCS | Mod: S$GLB,,, | Performed by: OBSTETRICS & GYNECOLOGY

## 2020-07-07 NOTE — PROGRESS NOTES
HPI:   60 y.o.   OB History        2    Para   2    Term   2            AB        Living   2       SAB        TAB        Ectopic        Multiple        Live Births                  No LMP recorded (lmp unknown). Patient is postmenopausal.    Patient is  here for her annual gynecologic exam.  She has no complaints.     ROS:  GENERAL: No fever, chills, fatigability or weight loss.  SKIN: No rashes, itching or changes in color or texture of skin.  HEAD: No headaches or recent head trauma.  EYES: Visual acuity fine. No photophobia, ocular pain or diplopia.  EARS: Denies ear pain, discharge or vertigo.  NOSE: No loss of smell, no epistaxis or postnasal drip.  MOUTH & THROAT: No hoarseness or change in voice. No excessive gum bleeding.  NODES: Denies swollen glands.  CHEST: Denies LOPEZ, cyanosis, wheezing, cough and sputum production.  CARDIOVASCULAR: Denies chest pain, PND, orthopnea or reduced exercise tolerance.  ABDOMEN: Appetite fine. No weight loss. Denies diarrhea, abdominal pain, hematemesis or blood in stool.  URINARY: No flank pain, dysuria or hematuria.  PERIPHERAL VASCULAR: No claudication or cyanosis.  MUSCULOSKELETAL: No joint stiffness or swelling. Denies back pain.  NEUROLOGIC: No history of seizures, paralysis, alteration of gait or coordination.    PE:   /84   Wt 87.5 kg (192 lb 14.4 oz)   LMP  (LMP Unknown)   BMI 33.11 kg/m²   APPEARANCE: Well nourished, well developed, in no acute distress.  NECK: Neck symmetric without masses or thyromegaly.  BREASTS: Symmetrical, no skin changes or visible lesions. No palpable masses, nipple discharge or adenopathy bilaterally.  ABDOMEN: Flat. Soft. No tenderness or masses. No hepatosplenomegaly. No hernias. No CVA tenderness.  VULVA: No lesions. Normal female genitalia.  URETHRAL MEATUS: Normal size and location, no lesions, no prolapse.  URETHRA: No masses, tenderness, prolapse or scarring.  VAGINA: Moist and well rugated, no discharge, no  significant cystocele or rectocele.  CERVIX: No lesions and discharge. PAP done.  UTERUS: Normal size, regular shape, mobile, non-tender, bladder base nontender.  ADNEXA: No masses, tenderness or CDS nodularity.  ANUS PERINEUM: Normal.    PROCEDURES:  Pap smear    Assessment:  Normal Gynecologic Exam    Plan:  Mammogram and Colonoscopy if indicated by current recommendations.  Return to clinic in one year or for any problems or complaints.  No co

## 2020-07-14 LAB
FINAL PATHOLOGIC DIAGNOSIS: NORMAL
Lab: NORMAL

## 2020-12-18 ENCOUNTER — OFFICE VISIT (OUTPATIENT)
Dept: ORTHOPEDICS | Facility: CLINIC | Age: 60
End: 2020-12-18
Payer: COMMERCIAL

## 2020-12-18 ENCOUNTER — TELEPHONE (OUTPATIENT)
Dept: ORTHOPEDICS | Facility: CLINIC | Age: 60
End: 2020-12-18

## 2020-12-18 VITALS — WEIGHT: 192.88 LBS | HEIGHT: 64 IN | BODY MASS INDEX: 32.93 KG/M2 | TEMPERATURE: 98 F

## 2020-12-18 DIAGNOSIS — M17.12 PRIMARY OSTEOARTHRITIS OF LEFT KNEE: Primary | ICD-10-CM

## 2020-12-18 PROCEDURE — 3008F BODY MASS INDEX DOCD: CPT | Mod: CPTII,S$GLB,, | Performed by: ORTHOPAEDIC SURGERY

## 2020-12-18 PROCEDURE — 1125F PR PAIN SEVERITY QUANTIFIED, PAIN PRESENT: ICD-10-PCS | Mod: S$GLB,,, | Performed by: ORTHOPAEDIC SURGERY

## 2020-12-18 PROCEDURE — 99213 PR OFFICE/OUTPT VISIT, EST, LEVL III, 20-29 MIN: ICD-10-PCS | Mod: 25,S$GLB,, | Performed by: ORTHOPAEDIC SURGERY

## 2020-12-18 PROCEDURE — 3008F PR BODY MASS INDEX (BMI) DOCUMENTED: ICD-10-PCS | Mod: CPTII,S$GLB,, | Performed by: ORTHOPAEDIC SURGERY

## 2020-12-18 PROCEDURE — 99213 OFFICE O/P EST LOW 20 MIN: CPT | Mod: 25,S$GLB,, | Performed by: ORTHOPAEDIC SURGERY

## 2020-12-18 PROCEDURE — 20610 PR DRAIN/INJECT LARGE JOINT/BURSA: ICD-10-PCS | Mod: LT,S$GLB,, | Performed by: ORTHOPAEDIC SURGERY

## 2020-12-18 PROCEDURE — 1125F AMNT PAIN NOTED PAIN PRSNT: CPT | Mod: S$GLB,,, | Performed by: ORTHOPAEDIC SURGERY

## 2020-12-18 PROCEDURE — 99999 PR PBB SHADOW E&M-EST. PATIENT-LVL III: CPT | Mod: PBBFAC,,, | Performed by: ORTHOPAEDIC SURGERY

## 2020-12-18 PROCEDURE — 20610 DRAIN/INJ JOINT/BURSA W/O US: CPT | Mod: LT,S$GLB,, | Performed by: ORTHOPAEDIC SURGERY

## 2020-12-18 PROCEDURE — 99999 PR PBB SHADOW E&M-EST. PATIENT-LVL III: ICD-10-PCS | Mod: PBBFAC,,, | Performed by: ORTHOPAEDIC SURGERY

## 2020-12-18 RX ORDER — TRIAMCINOLONE ACETONIDE 40 MG/ML
40 INJECTION, SUSPENSION INTRA-ARTICULAR; INTRAMUSCULAR
Status: DISCONTINUED | OUTPATIENT
Start: 2020-12-18 | End: 2020-12-18 | Stop reason: HOSPADM

## 2020-12-18 RX ADMIN — TRIAMCINOLONE ACETONIDE 40 MG: 40 INJECTION, SUSPENSION INTRA-ARTICULAR; INTRAMUSCULAR at 03:12

## 2020-12-18 NOTE — PROGRESS NOTES
Subjective:      Patient ID: Etta Irwin is a 60 y.o. female.    Chief Complaint: Follow-up (left knee)    HPI    Follow-up for osteoarthritis.  The patient had an injection almost a year ago and did well until about 1 month ago.  She now has increasing medial pain with walking.  She does not feel functionally disabled this time and denies severe pain.          Review of Systems   Constitution: Negative for fever and weight loss.   HENT: Negative for congestion.    Eyes: Negative for visual disturbance.   Cardiovascular: Negative for chest pain.   Respiratory: Negative for shortness of breath.    Hematologic/Lymphatic: Negative for bleeding problem. Does not bruise/bleed easily.   Skin: Negative for poor wound healing.   Musculoskeletal: Positive for joint pain.   Gastrointestinal: Negative for abdominal pain.   Genitourinary: Negative for dysuria.   Neurological: Negative for seizures.   Psychiatric/Behavioral: Negative for altered mental status.   Allergic/Immunologic: Negative for persistent infections.         Objective:      Ortho/SPM Exam      Left knee    [unfilled]    The patient is not in acute distress.   Sclerae normal  Body habitus is normal.  Respiratory distress:  none   The patient walks with a slight limp.  Hip irritability  negative.   The skin over the knee is intact.  Knee effusion trace  Tendernes is located medially  Range of motion- Flexion 130 deg, Extension 0 deg,   Ligament laxity exam:   MCL 1+   Lachman 0   Post sag  0    LCL 0  Patellar apprehension negative.  Popliteal cyst negative  Patellar crepitation absent.  Meniscal irritability not applicable  Pulses DP present, PT present.  Motor normal 5/5 strength in all tested muscle groups.   Sensory normal.          Assessment:       Encounter Diagnosis   Name Primary?    Primary osteoarthritis of left knee Yes        The left knee has previously documented medial narrowing.  I suspect that that has been objective progression since last  visit.          Plan:       Etta was seen today for follow-up.    Diagnoses and all orders for this visit:    Primary osteoarthritis of left knee          I explained my diagnostic impression and the reasoning behind it in detail, using layman's terms.  Models and/or pictures were used to help in the explanation.    Reinjection requested and consent given    I explained the potential role of surgery in the treatment of this condition to the patient.  They understand that if nonsurgical measures do not adequately control symptoms, surgery will be considered in the future.    X-ray next visit

## 2020-12-18 NOTE — PROCEDURES
Large Joint Aspiration/Injection    Date/Time: 12/18/2020 3:15 PM  Performed by: Jovi Valdovinos MD  Authorized by: Jovi Valdovinos MD     Medications:  40 mg triamcinolone acetonide 40 mg/mL     After obtaining verbal informed consent the patient's left knee was prepped aseptically and injected through an inferior lateral approach using 40 mg of triamcinolone and 1 cc of 1% plain Xylocaine.  The patient was warned about postinjection flare and how to manage it with ice, rest and over-the-counter analgesics.  They're advised to contact me for any severe, uncontrolled pain.

## 2020-12-29 ENCOUNTER — HOSPITAL ENCOUNTER (OUTPATIENT)
Dept: RADIOLOGY | Facility: HOSPITAL | Age: 60
Discharge: HOME | End: 2020-12-29
Attending: OBSTETRICS & GYNECOLOGY
Payer: MEDICARE

## 2020-12-29 DIAGNOSIS — Z09 FOLLOW-UP EXAM, 3-6 MONTHS SINCE PREVIOUS EXAM: ICD-10-CM

## 2020-12-29 PROCEDURE — 77062 BREAST TOMOSYNTHESIS BI: CPT | Mod: TC

## 2020-12-29 PROCEDURE — 77062 BREAST TOMOSYNTHESIS BI: CPT | Mod: 26,,, | Performed by: RADIOLOGY

## 2020-12-29 PROCEDURE — 77062 MAMMO DIGITAL DIAGNOSTIC BILAT WITH TOMO: ICD-10-PCS | Mod: 26,,, | Performed by: RADIOLOGY

## 2020-12-29 PROCEDURE — 77066 DX MAMMO INCL CAD BI: CPT | Mod: 26,,, | Performed by: RADIOLOGY

## 2020-12-29 PROCEDURE — 77066 MAMMO DIGITAL DIAGNOSTIC BILAT WITH TOMO: ICD-10-PCS | Mod: 26,,, | Performed by: RADIOLOGY

## 2021-02-26 ENCOUNTER — IMMUNIZATION (OUTPATIENT)
Dept: PHARMACY | Facility: CLINIC | Age: 61
End: 2021-02-26
Payer: COMMERCIAL

## 2021-02-26 DIAGNOSIS — Z23 NEED FOR VACCINATION: Primary | ICD-10-CM

## 2021-03-15 ENCOUNTER — TELEPHONE (OUTPATIENT)
Dept: ORTHOPEDICS | Facility: CLINIC | Age: 61
End: 2021-03-15

## 2021-03-23 ENCOUNTER — TELEPHONE (OUTPATIENT)
Dept: ORTHOPEDICS | Facility: CLINIC | Age: 61
End: 2021-03-23

## 2021-03-26 ENCOUNTER — HOSPITAL ENCOUNTER (OUTPATIENT)
Dept: RADIOLOGY | Facility: HOSPITAL | Age: 61
Discharge: HOME | End: 2021-03-26
Attending: ORTHOPAEDIC SURGERY
Payer: COMMERCIAL

## 2021-03-26 ENCOUNTER — TELEPHONE (OUTPATIENT)
Dept: ORTHOPEDICS | Facility: CLINIC | Age: 61
End: 2021-03-26

## 2021-03-26 ENCOUNTER — IMMUNIZATION (OUTPATIENT)
Dept: PHARMACY | Facility: CLINIC | Age: 61
End: 2021-03-26
Payer: COMMERCIAL

## 2021-03-26 ENCOUNTER — OFFICE VISIT (OUTPATIENT)
Dept: ORTHOPEDICS | Facility: CLINIC | Age: 61
End: 2021-03-26
Payer: COMMERCIAL

## 2021-03-26 VITALS
DIASTOLIC BLOOD PRESSURE: 81 MMHG | SYSTOLIC BLOOD PRESSURE: 130 MMHG | HEART RATE: 78 BPM | HEIGHT: 64 IN | WEIGHT: 192.88 LBS | BODY MASS INDEX: 32.93 KG/M2

## 2021-03-26 DIAGNOSIS — Z96.652 STATUS POST TOTAL LEFT KNEE REPLACEMENT: ICD-10-CM

## 2021-03-26 DIAGNOSIS — M17.12 PRIMARY OSTEOARTHRITIS OF LEFT KNEE: Primary | ICD-10-CM

## 2021-03-26 DIAGNOSIS — Z23 NEED FOR VACCINATION: Primary | ICD-10-CM

## 2021-03-26 DIAGNOSIS — M17.12 PRIMARY OSTEOARTHRITIS OF LEFT KNEE: ICD-10-CM

## 2021-03-26 DIAGNOSIS — Z41.9 ELECTIVE SURGERY: Primary | ICD-10-CM

## 2021-03-26 PROCEDURE — 73560 X-RAY EXAM OF KNEE 1 OR 2: CPT | Mod: 26,RT,, | Performed by: RADIOLOGY

## 2021-03-26 PROCEDURE — 99214 PR OFFICE/OUTPT VISIT, EST, LEVL IV, 30-39 MIN: ICD-10-PCS | Mod: S$GLB,,, | Performed by: ORTHOPAEDIC SURGERY

## 2021-03-26 PROCEDURE — 73562 X-RAY EXAM OF KNEE 3: CPT | Mod: 26,LT,, | Performed by: RADIOLOGY

## 2021-03-26 PROCEDURE — 99999 PR PBB SHADOW E&M-EST. PATIENT-LVL III: ICD-10-PCS | Mod: PBBFAC,,, | Performed by: ORTHOPAEDIC SURGERY

## 2021-03-26 PROCEDURE — 73560 XR KNEE ORTHO LEFT: ICD-10-PCS | Mod: 26,RT,, | Performed by: RADIOLOGY

## 2021-03-26 PROCEDURE — 73560 X-RAY EXAM OF KNEE 1 OR 2: CPT | Mod: TC,PN,RT

## 2021-03-26 PROCEDURE — 3008F BODY MASS INDEX DOCD: CPT | Mod: CPTII,S$GLB,, | Performed by: ORTHOPAEDIC SURGERY

## 2021-03-26 PROCEDURE — 73562 XR KNEE ORTHO LEFT: ICD-10-PCS | Mod: 26,LT,, | Performed by: RADIOLOGY

## 2021-03-26 PROCEDURE — 1125F PR PAIN SEVERITY QUANTIFIED, PAIN PRESENT: ICD-10-PCS | Mod: S$GLB,,, | Performed by: ORTHOPAEDIC SURGERY

## 2021-03-26 PROCEDURE — 1125F AMNT PAIN NOTED PAIN PRSNT: CPT | Mod: S$GLB,,, | Performed by: ORTHOPAEDIC SURGERY

## 2021-03-26 PROCEDURE — 3008F PR BODY MASS INDEX (BMI) DOCUMENTED: ICD-10-PCS | Mod: CPTII,S$GLB,, | Performed by: ORTHOPAEDIC SURGERY

## 2021-03-26 PROCEDURE — 99999 PR PBB SHADOW E&M-EST. PATIENT-LVL III: CPT | Mod: PBBFAC,,, | Performed by: ORTHOPAEDIC SURGERY

## 2021-03-26 PROCEDURE — 99214 OFFICE O/P EST MOD 30 MIN: CPT | Mod: S$GLB,,, | Performed by: ORTHOPAEDIC SURGERY

## 2021-03-26 RX ORDER — AMLODIPINE BESYLATE 5 MG/1
5 TABLET ORAL DAILY
COMMUNITY
Start: 2021-02-24 | End: 2021-06-28

## 2021-04-07 ENCOUNTER — TELEPHONE (OUTPATIENT)
Dept: ORTHOPEDICS | Facility: CLINIC | Age: 61
End: 2021-04-07

## 2021-04-12 ENCOUNTER — OFFICE VISIT (OUTPATIENT)
Dept: ORTHOPEDICS | Facility: CLINIC | Age: 61
End: 2021-04-12
Payer: COMMERCIAL

## 2021-04-12 ENCOUNTER — CLINICAL SUPPORT (OUTPATIENT)
Dept: LAB | Facility: HOSPITAL | Age: 61
End: 2021-04-12
Attending: ORTHOPAEDIC SURGERY
Payer: COMMERCIAL

## 2021-04-12 VITALS
HEART RATE: 81 BPM | SYSTOLIC BLOOD PRESSURE: 130 MMHG | DIASTOLIC BLOOD PRESSURE: 73 MMHG | WEIGHT: 192.88 LBS | BODY MASS INDEX: 32.93 KG/M2 | HEIGHT: 64 IN

## 2021-04-12 DIAGNOSIS — Z01.818 PRE-OP TESTING: ICD-10-CM

## 2021-04-12 DIAGNOSIS — M17.12 PRIMARY OSTEOARTHRITIS OF LEFT KNEE: Primary | ICD-10-CM

## 2021-04-12 DIAGNOSIS — Z41.9 ELECTIVE SURGERY: ICD-10-CM

## 2021-04-12 PROCEDURE — 3008F PR BODY MASS INDEX (BMI) DOCUMENTED: ICD-10-PCS | Mod: CPTII,S$GLB,, | Performed by: ORTHOPAEDIC SURGERY

## 2021-04-12 PROCEDURE — 93010 ELECTROCARDIOGRAM REPORT: CPT | Mod: ,,, | Performed by: INTERNAL MEDICINE

## 2021-04-12 PROCEDURE — 99999 PR PBB SHADOW E&M-EST. PATIENT-LVL IV: CPT | Mod: PBBFAC,,, | Performed by: ORTHOPAEDIC SURGERY

## 2021-04-12 PROCEDURE — 99499 NO LOS: ICD-10-PCS | Mod: S$GLB,,, | Performed by: ORTHOPAEDIC SURGERY

## 2021-04-12 PROCEDURE — 1125F PR PAIN SEVERITY QUANTIFIED, PAIN PRESENT: ICD-10-PCS | Mod: S$GLB,,, | Performed by: ORTHOPAEDIC SURGERY

## 2021-04-12 PROCEDURE — 99999 PR PBB SHADOW E&M-EST. PATIENT-LVL IV: ICD-10-PCS | Mod: PBBFAC,,, | Performed by: ORTHOPAEDIC SURGERY

## 2021-04-12 PROCEDURE — 3008F BODY MASS INDEX DOCD: CPT | Mod: CPTII,S$GLB,, | Performed by: ORTHOPAEDIC SURGERY

## 2021-04-12 PROCEDURE — 93010 EKG 12-LEAD: ICD-10-PCS | Mod: ,,, | Performed by: INTERNAL MEDICINE

## 2021-04-12 PROCEDURE — 93005 ELECTROCARDIOGRAM TRACING: CPT

## 2021-04-12 PROCEDURE — 1125F AMNT PAIN NOTED PAIN PRSNT: CPT | Mod: S$GLB,,, | Performed by: ORTHOPAEDIC SURGERY

## 2021-04-12 PROCEDURE — 99499 UNLISTED E&M SERVICE: CPT | Mod: S$GLB,,, | Performed by: ORTHOPAEDIC SURGERY

## 2021-04-13 ENCOUNTER — TELEPHONE (OUTPATIENT)
Dept: ORTHOPEDICS | Facility: CLINIC | Age: 61
End: 2021-04-13

## 2021-04-13 DIAGNOSIS — Z01.818 PRE-OPERATIVE CLEARANCE: ICD-10-CM

## 2021-04-13 DIAGNOSIS — R94.31 ABNORMAL EKG: Primary | ICD-10-CM

## 2021-04-14 ENCOUNTER — TELEPHONE (OUTPATIENT)
Dept: CARDIOLOGY | Facility: CLINIC | Age: 61
End: 2021-04-14

## 2021-04-14 ENCOUNTER — ANESTHESIA EVENT (OUTPATIENT)
Dept: SURGERY | Facility: HOSPITAL | Age: 61
End: 2021-04-14
Payer: COMMERCIAL

## 2021-04-14 ENCOUNTER — HOSPITAL ENCOUNTER (OUTPATIENT)
Dept: PREADMISSION TESTING | Facility: HOSPITAL | Age: 61
Discharge: HOME | End: 2021-04-14
Attending: ORTHOPAEDIC SURGERY
Payer: COMMERCIAL

## 2021-04-14 VITALS
RESPIRATION RATE: 16 BRPM | OXYGEN SATURATION: 98 % | SYSTOLIC BLOOD PRESSURE: 127 MMHG | HEART RATE: 69 BPM | DIASTOLIC BLOOD PRESSURE: 64 MMHG | BODY MASS INDEX: 39.82 KG/M2 | WEIGHT: 202.81 LBS | HEIGHT: 60 IN

## 2021-04-14 RX ORDER — LIDOCAINE HYDROCHLORIDE 10 MG/ML
1 INJECTION, SOLUTION EPIDURAL; INFILTRATION; INTRACAUDAL; PERINEURAL ONCE
Status: CANCELLED | OUTPATIENT
Start: 2021-04-14 | End: 2021-04-14

## 2021-04-14 RX ORDER — SODIUM CHLORIDE, SODIUM LACTATE, POTASSIUM CHLORIDE, CALCIUM CHLORIDE 600; 310; 30; 20 MG/100ML; MG/100ML; MG/100ML; MG/100ML
INJECTION, SOLUTION INTRAVENOUS CONTINUOUS
Status: CANCELLED | OUTPATIENT
Start: 2021-04-14

## 2021-04-15 ENCOUNTER — OFFICE VISIT (OUTPATIENT)
Dept: CARDIOLOGY | Facility: CLINIC | Age: 61
End: 2021-04-15
Payer: COMMERCIAL

## 2021-04-15 VITALS
OXYGEN SATURATION: 99 % | WEIGHT: 205 LBS | BODY MASS INDEX: 40.04 KG/M2 | DIASTOLIC BLOOD PRESSURE: 78 MMHG | HEART RATE: 82 BPM | SYSTOLIC BLOOD PRESSURE: 121 MMHG

## 2021-04-15 DIAGNOSIS — E66.01 CLASS 3 SEVERE OBESITY DUE TO EXCESS CALORIES WITHOUT SERIOUS COMORBIDITY WITH BODY MASS INDEX (BMI) OF 40.0 TO 44.9 IN ADULT: Chronic | ICD-10-CM

## 2021-04-15 DIAGNOSIS — R94.31 ABNORMAL EKG: ICD-10-CM

## 2021-04-15 DIAGNOSIS — Z01.818 PRE-OPERATIVE CLEARANCE: ICD-10-CM

## 2021-04-15 DIAGNOSIS — E11.69 HYPERLIPIDEMIA ASSOCIATED WITH TYPE 2 DIABETES MELLITUS: Chronic | ICD-10-CM

## 2021-04-15 DIAGNOSIS — E78.5 HYPERLIPIDEMIA ASSOCIATED WITH TYPE 2 DIABETES MELLITUS: Chronic | ICD-10-CM

## 2021-04-15 DIAGNOSIS — I10 ESSENTIAL HYPERTENSION: Chronic | ICD-10-CM

## 2021-04-15 PROBLEM — E66.813 CLASS 3 SEVERE OBESITY IN ADULT: Chronic | Status: ACTIVE | Noted: 2021-04-15

## 2021-04-15 PROCEDURE — 99204 PR OFFICE/OUTPT VISIT, NEW, LEVL IV, 45-59 MIN: ICD-10-PCS | Mod: S$GLB,,, | Performed by: INTERNAL MEDICINE

## 2021-04-15 PROCEDURE — 3008F BODY MASS INDEX DOCD: CPT | Mod: CPTII,S$GLB,, | Performed by: INTERNAL MEDICINE

## 2021-04-15 PROCEDURE — 3008F PR BODY MASS INDEX (BMI) DOCUMENTED: ICD-10-PCS | Mod: CPTII,S$GLB,, | Performed by: INTERNAL MEDICINE

## 2021-04-15 PROCEDURE — 99204 OFFICE O/P NEW MOD 45 MIN: CPT | Mod: S$GLB,,, | Performed by: INTERNAL MEDICINE

## 2021-04-15 PROCEDURE — 1125F AMNT PAIN NOTED PAIN PRSNT: CPT | Mod: S$GLB,,, | Performed by: INTERNAL MEDICINE

## 2021-04-15 PROCEDURE — 1125F PR PAIN SEVERITY QUANTIFIED, PAIN PRESENT: ICD-10-PCS | Mod: S$GLB,,, | Performed by: INTERNAL MEDICINE

## 2021-04-15 PROCEDURE — 99999 PR PBB SHADOW E&M-EST. PATIENT-LVL IV: CPT | Mod: PBBFAC,,, | Performed by: INTERNAL MEDICINE

## 2021-04-15 PROCEDURE — 99999 PR PBB SHADOW E&M-EST. PATIENT-LVL IV: ICD-10-PCS | Mod: PBBFAC,,, | Performed by: INTERNAL MEDICINE

## 2021-04-19 ENCOUNTER — TELEPHONE (OUTPATIENT)
Dept: ORTHOPEDICS | Facility: CLINIC | Age: 61
End: 2021-04-19

## 2021-04-26 ENCOUNTER — LAB VISIT (OUTPATIENT)
Dept: FAMILY MEDICINE | Facility: CLINIC | Age: 61
End: 2021-04-26
Payer: COMMERCIAL

## 2021-04-26 DIAGNOSIS — Z41.9 ELECTIVE SURGERY: ICD-10-CM

## 2021-04-26 PROCEDURE — U0005 INFEC AGEN DETEC AMPLI PROBE: HCPCS | Performed by: ORTHOPAEDIC SURGERY

## 2021-04-26 PROCEDURE — U0003 INFECTIOUS AGENT DETECTION BY NUCLEIC ACID (DNA OR RNA); SEVERE ACUTE RESPIRATORY SYNDROME CORONAVIRUS 2 (SARS-COV-2) (CORONAVIRUS DISEASE [COVID-19]), AMPLIFIED PROBE TECHNIQUE, MAKING USE OF HIGH THROUGHPUT TECHNOLOGIES AS DESCRIBED BY CMS-2020-01-R: HCPCS | Performed by: ORTHOPAEDIC SURGERY

## 2021-04-28 LAB — SARS-COV-2 RNA RESP QL NAA+PROBE: NOT DETECTED

## 2021-04-29 ENCOUNTER — HOSPITAL ENCOUNTER (OUTPATIENT)
Facility: HOSPITAL | Age: 61
Discharge: HOME HEALTH | End: 2021-05-01
Attending: ORTHOPAEDIC SURGERY | Admitting: ORTHOPAEDIC SURGERY
Payer: COMMERCIAL

## 2021-04-29 ENCOUNTER — ANESTHESIA (OUTPATIENT)
Dept: SURGERY | Facility: HOSPITAL | Age: 61
End: 2021-04-29
Payer: COMMERCIAL

## 2021-04-29 DIAGNOSIS — M17.12 PRIMARY OSTEOARTHRITIS OF LEFT KNEE: ICD-10-CM

## 2021-04-29 DIAGNOSIS — Z96.652 HISTORY OF LEFT KNEE REPLACEMENT: Primary | ICD-10-CM

## 2021-04-29 LAB
ESTIMATED AVG GLUCOSE: 114 MG/DL (ref 68–131)
HBA1C MFR BLD: 5.6 % (ref 4–5.6)
POCT GLUCOSE: 101 MG/DL (ref 70–110)
POCT GLUCOSE: 121 MG/DL (ref 70–110)
POCT GLUCOSE: 170 MG/DL (ref 70–110)

## 2021-04-29 PROCEDURE — 36000711: Performed by: ORTHOPAEDIC SURGERY

## 2021-04-29 PROCEDURE — 36415 COLL VENOUS BLD VENIPUNCTURE: CPT | Performed by: ORTHOPAEDIC SURGERY

## 2021-04-29 PROCEDURE — 25000003 PHARM REV CODE 250: Performed by: ORTHOPAEDIC SURGERY

## 2021-04-29 PROCEDURE — 71000015 HC POSTOP RECOV 1ST HR: Performed by: ORTHOPAEDIC SURGERY

## 2021-04-29 PROCEDURE — 97161 PT EVAL LOW COMPLEX 20 MIN: CPT

## 2021-04-29 PROCEDURE — 76942 ECHO GUIDE FOR BIOPSY: CPT | Performed by: ANESTHESIOLOGY

## 2021-04-29 PROCEDURE — 37000009 HC ANESTHESIA EA ADD 15 MINS: Performed by: ORTHOPAEDIC SURGERY

## 2021-04-29 PROCEDURE — 63600175 PHARM REV CODE 636 W HCPCS: Performed by: STUDENT IN AN ORGANIZED HEALTH CARE EDUCATION/TRAINING PROGRAM

## 2021-04-29 PROCEDURE — 27447 PR TOTAL KNEE ARTHROPLASTY: ICD-10-PCS | Mod: AS,LT,, | Performed by: ORTHOPAEDIC SURGERY

## 2021-04-29 PROCEDURE — 71000016 HC POSTOP RECOV ADDL HR: Performed by: ORTHOPAEDIC SURGERY

## 2021-04-29 PROCEDURE — 27447 PR TOTAL KNEE ARTHROPLASTY: ICD-10-PCS | Mod: LT,,, | Performed by: ORTHOPAEDIC SURGERY

## 2021-04-29 PROCEDURE — C1776 JOINT DEVICE (IMPLANTABLE): HCPCS | Performed by: ORTHOPAEDIC SURGERY

## 2021-04-29 PROCEDURE — 25000003 PHARM REV CODE 250: Performed by: STUDENT IN AN ORGANIZED HEALTH CARE EDUCATION/TRAINING PROGRAM

## 2021-04-29 PROCEDURE — 27201423 OPTIME MED/SURG SUP & DEVICES STERILE SUPPLY: Performed by: ORTHOPAEDIC SURGERY

## 2021-04-29 PROCEDURE — 97165 OT EVAL LOW COMPLEX 30 MIN: CPT

## 2021-04-29 PROCEDURE — 27447 TOTAL KNEE ARTHROPLASTY: CPT | Mod: AS,LT,, | Performed by: ORTHOPAEDIC SURGERY

## 2021-04-29 PROCEDURE — C1713 ANCHOR/SCREW BN/BN,TIS/BN: HCPCS | Performed by: ORTHOPAEDIC SURGERY

## 2021-04-29 PROCEDURE — 71000039 HC RECOVERY, EACH ADD'L HOUR: Performed by: ORTHOPAEDIC SURGERY

## 2021-04-29 PROCEDURE — 25000003 PHARM REV CODE 250: Performed by: ANESTHESIOLOGY

## 2021-04-29 PROCEDURE — 64447 NJX AA&/STRD FEMORAL NRV IMG: CPT | Mod: 59 | Performed by: ANESTHESIOLOGY

## 2021-04-29 PROCEDURE — 63600175 PHARM REV CODE 636 W HCPCS: Performed by: ORTHOPAEDIC SURGERY

## 2021-04-29 PROCEDURE — 97535 SELF CARE MNGMENT TRAINING: CPT

## 2021-04-29 PROCEDURE — 83036 HEMOGLOBIN GLYCOSYLATED A1C: CPT | Performed by: ORTHOPAEDIC SURGERY

## 2021-04-29 PROCEDURE — 37000008 HC ANESTHESIA 1ST 15 MINUTES: Performed by: ORTHOPAEDIC SURGERY

## 2021-04-29 PROCEDURE — 36000710: Performed by: ORTHOPAEDIC SURGERY

## 2021-04-29 PROCEDURE — 71000033 HC RECOVERY, INTIAL HOUR: Performed by: ORTHOPAEDIC SURGERY

## 2021-04-29 PROCEDURE — 27447 TOTAL KNEE ARTHROPLASTY: CPT | Mod: LT,,, | Performed by: ORTHOPAEDIC SURGERY

## 2021-04-29 PROCEDURE — 27200688 HC TRAY, SPINAL-HYPER/ ISOBARIC: Performed by: ANESTHESIOLOGY

## 2021-04-29 PROCEDURE — 63600175 PHARM REV CODE 636 W HCPCS: Performed by: NURSE ANESTHETIST, CERTIFIED REGISTERED

## 2021-04-29 DEVICE — COMPONENT FEM POST SZ 2.5 LEF: Type: IMPLANTABLE DEVICE | Site: KNEE | Status: FUNCTIONAL

## 2021-04-29 DEVICE — IMPLANTABLE DEVICE: Type: IMPLANTABLE DEVICE | Site: KNEE | Status: FUNCTIONAL

## 2021-04-29 DEVICE — PATELLA OVAL DOME 32MM: Type: IMPLANTABLE DEVICE | Site: KNEE | Status: FUNCTIONAL

## 2021-04-29 DEVICE — BASEPLATE TIBIAL CEM MOD SZ 2: Type: IMPLANTABLE DEVICE | Site: KNEE | Status: FUNCTIONAL

## 2021-04-29 DEVICE — CEMENT BONE SURG SMPLX P RADPQ: Type: IMPLANTABLE DEVICE | Site: KNEE | Status: FUNCTIONAL

## 2021-04-29 RX ORDER — SODIUM CHLORIDE 0.9 % (FLUSH) 0.9 %
10 SYRINGE (ML) INJECTION
Status: DISCONTINUED | OUTPATIENT
Start: 2021-04-29 | End: 2021-04-29 | Stop reason: HOSPADM

## 2021-04-29 RX ORDER — SODIUM CHLORIDE, SODIUM LACTATE, POTASSIUM CHLORIDE, CALCIUM CHLORIDE 600; 310; 30; 20 MG/100ML; MG/100ML; MG/100ML; MG/100ML
INJECTION, SOLUTION INTRAVENOUS CONTINUOUS
Status: DISCONTINUED | OUTPATIENT
Start: 2021-04-29 | End: 2021-04-29

## 2021-04-29 RX ORDER — HYDROMORPHONE HYDROCHLORIDE 2 MG/ML
0.5 INJECTION, SOLUTION INTRAMUSCULAR; INTRAVENOUS; SUBCUTANEOUS EVERY 5 MIN PRN
Status: ACTIVE | OUTPATIENT
Start: 2021-04-29 | End: 2021-04-29

## 2021-04-29 RX ORDER — GLUCAGON 1 MG
1 KIT INJECTION
Status: DISCONTINUED | OUTPATIENT
Start: 2021-04-29 | End: 2021-05-01 | Stop reason: HOSPADM

## 2021-04-29 RX ORDER — NAPROXEN 250 MG/1
500 TABLET ORAL
Status: DISCONTINUED | OUTPATIENT
Start: 2021-04-29 | End: 2021-04-29

## 2021-04-29 RX ORDER — CEFAZOLIN SODIUM 1 G/3ML
INJECTION, POWDER, FOR SOLUTION INTRAMUSCULAR; INTRAVENOUS
Status: DISCONTINUED | OUTPATIENT
Start: 2021-04-29 | End: 2021-04-29

## 2021-04-29 RX ORDER — ONDANSETRON 2 MG/ML
INJECTION INTRAMUSCULAR; INTRAVENOUS
Status: DISCONTINUED | OUTPATIENT
Start: 2021-04-29 | End: 2021-04-29

## 2021-04-29 RX ORDER — LIDOCAINE HYDROCHLORIDE 10 MG/ML
1 INJECTION, SOLUTION EPIDURAL; INFILTRATION; INTRACAUDAL; PERINEURAL ONCE
Status: DISCONTINUED | OUTPATIENT
Start: 2021-04-29 | End: 2021-04-29 | Stop reason: HOSPADM

## 2021-04-29 RX ORDER — CEFAZOLIN SODIUM 2 G/50ML
2 SOLUTION INTRAVENOUS
Status: COMPLETED | OUTPATIENT
Start: 2021-04-29 | End: 2021-04-30

## 2021-04-29 RX ORDER — SODIUM CHLORIDE 0.9 % (FLUSH) 0.9 %
3 SYRINGE (ML) INJECTION EVERY 8 HOURS
Status: DISCONTINUED | OUTPATIENT
Start: 2021-04-29 | End: 2021-04-29

## 2021-04-29 RX ORDER — BISACODYL 10 MG
10 SUPPOSITORY, RECTAL RECTAL DAILY PRN
Status: DISCONTINUED | OUTPATIENT
Start: 2021-04-29 | End: 2021-05-01 | Stop reason: HOSPADM

## 2021-04-29 RX ORDER — OXYCODONE HYDROCHLORIDE 5 MG/1
5 TABLET ORAL
Status: DISCONTINUED | OUTPATIENT
Start: 2021-04-29 | End: 2021-05-01 | Stop reason: HOSPADM

## 2021-04-29 RX ORDER — OXYCODONE HYDROCHLORIDE 5 MG/1
10 TABLET ORAL
Status: DISCONTINUED | OUTPATIENT
Start: 2021-04-29 | End: 2021-05-01 | Stop reason: HOSPADM

## 2021-04-29 RX ORDER — AMLODIPINE BESYLATE 5 MG/1
5 TABLET ORAL DAILY
Status: DISCONTINUED | OUTPATIENT
Start: 2021-04-30 | End: 2021-05-01 | Stop reason: HOSPADM

## 2021-04-29 RX ORDER — IBUPROFEN 200 MG
16 TABLET ORAL
Status: DISCONTINUED | OUTPATIENT
Start: 2021-04-29 | End: 2021-05-01 | Stop reason: HOSPADM

## 2021-04-29 RX ORDER — AMOXICILLIN 250 MG
1 CAPSULE ORAL 2 TIMES DAILY
Status: DISCONTINUED | OUTPATIENT
Start: 2021-04-29 | End: 2021-05-01 | Stop reason: HOSPADM

## 2021-04-29 RX ORDER — FAMOTIDINE 20 MG/1
20 TABLET, FILM COATED ORAL DAILY
Status: DISCONTINUED | OUTPATIENT
Start: 2021-04-29 | End: 2021-05-01 | Stop reason: HOSPADM

## 2021-04-29 RX ORDER — PIOGLITAZONEHYDROCHLORIDE 15 MG/1
30 TABLET ORAL DAILY
Status: DISCONTINUED | OUTPATIENT
Start: 2021-04-30 | End: 2021-05-01 | Stop reason: HOSPADM

## 2021-04-29 RX ORDER — PROPOFOL 10 MG/ML
VIAL (ML) INTRAVENOUS
Status: DISCONTINUED | OUTPATIENT
Start: 2021-04-29 | End: 2021-04-29

## 2021-04-29 RX ORDER — HYDROMORPHONE HYDROCHLORIDE 2 MG/ML
0.5 INJECTION, SOLUTION INTRAMUSCULAR; INTRAVENOUS; SUBCUTANEOUS EVERY 6 HOURS PRN
Status: DISCONTINUED | OUTPATIENT
Start: 2021-04-29 | End: 2021-05-01 | Stop reason: HOSPADM

## 2021-04-29 RX ORDER — PROPOFOL 10 MG/ML
VIAL (ML) INTRAVENOUS CONTINUOUS PRN
Status: DISCONTINUED | OUTPATIENT
Start: 2021-04-29 | End: 2021-04-29

## 2021-04-29 RX ORDER — TRANEXAMIC ACID 100 MG/ML
INJECTION, SOLUTION INTRAVENOUS
Status: DISCONTINUED | OUTPATIENT
Start: 2021-04-29 | End: 2021-04-29 | Stop reason: HOSPADM

## 2021-04-29 RX ORDER — ONDANSETRON 2 MG/ML
4 INJECTION INTRAMUSCULAR; INTRAVENOUS DAILY PRN
Status: DISCONTINUED | OUTPATIENT
Start: 2021-04-29 | End: 2021-04-29 | Stop reason: HOSPADM

## 2021-04-29 RX ORDER — SODIUM CHLORIDE 0.9 % (FLUSH) 0.9 %
5 SYRINGE (ML) INJECTION
Status: DISCONTINUED | OUTPATIENT
Start: 2021-04-29 | End: 2021-05-01 | Stop reason: HOSPADM

## 2021-04-29 RX ORDER — BUPIVACAINE HYDROCHLORIDE 2.5 MG/ML
INJECTION, SOLUTION EPIDURAL; INFILTRATION; INTRACAUDAL
Status: DISCONTINUED | OUTPATIENT
Start: 2021-04-29 | End: 2021-04-29

## 2021-04-29 RX ORDER — LACTULOSE 10 G/15ML
20 SOLUTION ORAL EVERY 6 HOURS PRN
Status: DISCONTINUED | OUTPATIENT
Start: 2021-04-29 | End: 2021-05-01 | Stop reason: HOSPADM

## 2021-04-29 RX ORDER — ONDANSETRON 2 MG/ML
4 INJECTION INTRAMUSCULAR; INTRAVENOUS EVERY 6 HOURS PRN
Status: DISCONTINUED | OUTPATIENT
Start: 2021-04-29 | End: 2021-05-01 | Stop reason: HOSPADM

## 2021-04-29 RX ORDER — OXYCODONE AND ACETAMINOPHEN 5; 325 MG/1; MG/1
1 TABLET ORAL EVERY 6 HOURS PRN
Qty: 45 TABLET | Refills: 0 | Status: SHIPPED | OUTPATIENT
Start: 2021-04-29 | End: 2021-05-12 | Stop reason: SDUPTHER

## 2021-04-29 RX ORDER — MUPIROCIN 20 MG/G
OINTMENT TOPICAL
Status: DISCONTINUED | OUTPATIENT
Start: 2021-04-29 | End: 2021-04-29 | Stop reason: HOSPADM

## 2021-04-29 RX ORDER — SODIUM CHLORIDE, SODIUM LACTATE, POTASSIUM CHLORIDE, CALCIUM CHLORIDE 600; 310; 30; 20 MG/100ML; MG/100ML; MG/100ML; MG/100ML
INJECTION, SOLUTION INTRAVENOUS CONTINUOUS PRN
Status: DISCONTINUED | OUTPATIENT
Start: 2021-04-29 | End: 2021-04-29

## 2021-04-29 RX ORDER — NAPROXEN SODIUM 220 MG/1
81 TABLET, FILM COATED ORAL DAILY
Status: DISCONTINUED | OUTPATIENT
Start: 2021-04-30 | End: 2021-05-01 | Stop reason: HOSPADM

## 2021-04-29 RX ORDER — INSULIN ASPART 100 [IU]/ML
0-5 INJECTION, SOLUTION INTRAVENOUS; SUBCUTANEOUS
Status: DISCONTINUED | OUTPATIENT
Start: 2021-04-29 | End: 2021-05-01 | Stop reason: HOSPADM

## 2021-04-29 RX ORDER — LEVOTHYROXINE SODIUM 50 UG/1
50 TABLET ORAL
Status: DISCONTINUED | OUTPATIENT
Start: 2021-04-30 | End: 2021-05-01 | Stop reason: HOSPADM

## 2021-04-29 RX ORDER — ONDANSETRON 4 MG/1
4 TABLET, ORALLY DISINTEGRATING ORAL EVERY 6 HOURS PRN
Status: DISCONTINUED | OUTPATIENT
Start: 2021-04-29 | End: 2021-05-01 | Stop reason: HOSPADM

## 2021-04-29 RX ORDER — ESCITALOPRAM OXALATE 10 MG/1
20 TABLET ORAL NIGHTLY
Status: DISCONTINUED | OUTPATIENT
Start: 2021-04-29 | End: 2021-05-01 | Stop reason: HOSPADM

## 2021-04-29 RX ORDER — POLYETHYLENE GLYCOL 3350 17 G/17G
17 POWDER, FOR SOLUTION ORAL DAILY
Status: DISCONTINUED | OUTPATIENT
Start: 2021-04-29 | End: 2021-05-01 | Stop reason: HOSPADM

## 2021-04-29 RX ORDER — TRANEXAMIC ACID 100 MG/ML
1000 INJECTION, SOLUTION INTRAVENOUS
Status: COMPLETED | OUTPATIENT
Start: 2021-04-29 | End: 2021-04-29

## 2021-04-29 RX ORDER — AMITRIPTYLINE HYDROCHLORIDE 25 MG/1
50 TABLET, FILM COATED ORAL NIGHTLY
Status: DISCONTINUED | OUTPATIENT
Start: 2021-04-29 | End: 2021-05-01 | Stop reason: HOSPADM

## 2021-04-29 RX ORDER — ACETAMINOPHEN 500 MG
1000 TABLET ORAL
Status: DISCONTINUED | OUTPATIENT
Start: 2021-04-29 | End: 2021-04-29

## 2021-04-29 RX ORDER — CEFAZOLIN SODIUM 2 G/50ML
2 SOLUTION INTRAVENOUS
Status: DISCONTINUED | OUTPATIENT
Start: 2021-04-29 | End: 2021-04-29 | Stop reason: HOSPADM

## 2021-04-29 RX ORDER — IBUPROFEN 200 MG
24 TABLET ORAL
Status: DISCONTINUED | OUTPATIENT
Start: 2021-04-29 | End: 2021-05-01 | Stop reason: HOSPADM

## 2021-04-29 RX ORDER — SODIUM CHLORIDE 9 MG/ML
INJECTION, SOLUTION INTRAVENOUS CONTINUOUS
Status: DISCONTINUED | OUTPATIENT
Start: 2021-04-29 | End: 2021-04-30

## 2021-04-29 RX ORDER — MIDAZOLAM HYDROCHLORIDE 1 MG/ML
INJECTION INTRAMUSCULAR; INTRAVENOUS
Status: DISCONTINUED | OUTPATIENT
Start: 2021-04-29 | End: 2021-04-29

## 2021-04-29 RX ORDER — ACETAMINOPHEN 500 MG
1000 TABLET ORAL 3 TIMES DAILY
Status: DISCONTINUED | OUTPATIENT
Start: 2021-04-29 | End: 2021-05-01 | Stop reason: HOSPADM

## 2021-04-29 RX ORDER — ATORVASTATIN CALCIUM 40 MG/1
40 TABLET, FILM COATED ORAL NIGHTLY
Status: DISCONTINUED | OUTPATIENT
Start: 2021-04-29 | End: 2021-05-01 | Stop reason: HOSPADM

## 2021-04-29 RX ORDER — LISINOPRIL 20 MG/1
20 TABLET ORAL DAILY
Status: DISCONTINUED | OUTPATIENT
Start: 2021-04-30 | End: 2021-05-01 | Stop reason: HOSPADM

## 2021-04-29 RX ORDER — NAPROXEN 250 MG/1
500 TABLET ORAL 2 TIMES DAILY WITH MEALS
Status: DISCONTINUED | OUTPATIENT
Start: 2021-04-29 | End: 2021-05-01 | Stop reason: HOSPADM

## 2021-04-29 RX ORDER — BUPIVACAINE HYDROCHLORIDE 7.5 MG/ML
INJECTION, SOLUTION INTRASPINAL
Status: DISCONTINUED | OUTPATIENT
Start: 2021-04-29 | End: 2021-04-29

## 2021-04-29 RX ORDER — METFORMIN HYDROCHLORIDE 500 MG/1
1000 TABLET ORAL 2 TIMES DAILY WITH MEALS
Status: DISCONTINUED | OUTPATIENT
Start: 2021-04-29 | End: 2021-05-01 | Stop reason: HOSPADM

## 2021-04-29 RX ORDER — PREGABALIN 75 MG/1
150 CAPSULE ORAL
Status: DISCONTINUED | OUTPATIENT
Start: 2021-04-29 | End: 2021-04-29

## 2021-04-29 RX ORDER — ZOLPIDEM TARTRATE 5 MG/1
5 TABLET ORAL NIGHTLY PRN
Status: DISCONTINUED | OUTPATIENT
Start: 2021-04-29 | End: 2021-05-01 | Stop reason: HOSPADM

## 2021-04-29 RX ADMIN — DOCUSATE SODIUM 50 MG AND SENNOSIDES 8.6 MG 1 TABLET: 8.6; 5 TABLET, FILM COATED ORAL at 09:04

## 2021-04-29 RX ADMIN — PHENYLEPHRINE HYDROCHLORIDE 25 MCG/MIN: 10 INJECTION INTRAVENOUS at 12:04

## 2021-04-29 RX ADMIN — OXYCODONE HYDROCHLORIDE 5 MG: 5 TABLET ORAL at 04:04

## 2021-04-29 RX ADMIN — CEFAZOLIN SODIUM 2 G: 2 SOLUTION INTRAVENOUS at 09:04

## 2021-04-29 RX ADMIN — BUPIVACAINE HYDROCHLORIDE 1.6 ML: 7.5 INJECTION, SOLUTION SUBARACHNOID at 12:04

## 2021-04-29 RX ADMIN — SODIUM CHLORIDE, SODIUM LACTATE, POTASSIUM CHLORIDE, AND CALCIUM CHLORIDE: .6; .31; .03; .02 INJECTION, SOLUTION INTRAVENOUS at 12:04

## 2021-04-29 RX ADMIN — METFORMIN HYDROCHLORIDE 1000 MG: 500 TABLET ORAL at 09:04

## 2021-04-29 RX ADMIN — AMITRIPTYLINE HYDROCHLORIDE 50 MG: 25 TABLET, FILM COATED ORAL at 09:04

## 2021-04-29 RX ADMIN — ONDANSETRON 4 MG: 2 INJECTION, SOLUTION INTRAMUSCULAR; INTRAVENOUS at 02:04

## 2021-04-29 RX ADMIN — PROPOFOL 75 MCG/KG/MIN: 10 INJECTION, EMULSION INTRAVENOUS at 12:04

## 2021-04-29 RX ADMIN — MIDAZOLAM HYDROCHLORIDE 2 MG: 1 INJECTION, SOLUTION INTRAMUSCULAR; INTRAVENOUS at 12:04

## 2021-04-29 RX ADMIN — SODIUM CHLORIDE, SODIUM LACTATE, POTASSIUM CHLORIDE, AND CALCIUM CHLORIDE: .6; .31; .03; .02 INJECTION, SOLUTION INTRAVENOUS at 02:04

## 2021-04-29 RX ADMIN — ESCITALOPRAM OXALATE 20 MG: 10 TABLET ORAL at 09:04

## 2021-04-29 RX ADMIN — CEFAZOLIN 2 G: 330 INJECTION, POWDER, FOR SOLUTION INTRAMUSCULAR; INTRAVENOUS at 12:04

## 2021-04-29 RX ADMIN — PROPOFOL 50 MG: 10 INJECTION, EMULSION INTRAVENOUS at 12:04

## 2021-04-29 RX ADMIN — ACETAMINOPHEN 1000 MG: 325 TABLET ORAL at 09:04

## 2021-04-29 RX ADMIN — ACETAMINOPHEN 1000 MG: 325 TABLET ORAL at 05:04

## 2021-04-29 RX ADMIN — ATORVASTATIN CALCIUM 40 MG: 40 TABLET, FILM COATED ORAL at 09:04

## 2021-04-29 RX ADMIN — BUPIVACAINE HYDROCHLORIDE 20 ML: 2.5 INJECTION, SOLUTION EPIDURAL; INFILTRATION; INTRACAUDAL; PERINEURAL at 03:04

## 2021-04-29 RX ADMIN — HYDROMORPHONE HYDROCHLORIDE 0.5 MG: 2 INJECTION, SOLUTION INTRAMUSCULAR; INTRAVENOUS; SUBCUTANEOUS at 09:04

## 2021-04-29 RX ADMIN — SODIUM CHLORIDE: 0.9 INJECTION, SOLUTION INTRAVENOUS at 09:04

## 2021-04-29 RX ADMIN — TRANEXAMIC ACID 1000 MG: 100 INJECTION, SOLUTION INTRAVENOUS at 12:04

## 2021-04-29 RX ADMIN — NAPROXEN 500 MG: 250 TABLET ORAL at 09:04

## 2021-04-30 LAB
ANION GAP SERPL CALC-SCNC: 5 MMOL/L (ref 8–16)
BUN SERPL-MCNC: 13 MG/DL (ref 8–23)
CALCIUM SERPL-MCNC: 7.7 MG/DL (ref 8.7–10.5)
CHLORIDE SERPL-SCNC: 109 MMOL/L (ref 95–110)
CO2 SERPL-SCNC: 26 MMOL/L (ref 23–29)
CREAT SERPL-MCNC: 0.7 MG/DL (ref 0.5–1.4)
EST. GFR  (AFRICAN AMERICAN): >60 ML/MIN/1.73 M^2
EST. GFR  (NON AFRICAN AMERICAN): >60 ML/MIN/1.73 M^2
GLUCOSE SERPL-MCNC: 104 MG/DL (ref 70–110)
HCT VFR BLD AUTO: 36.4 % (ref 37–48.5)
HGB BLD-MCNC: 11.5 G/DL (ref 12–16)
POCT GLUCOSE: 116 MG/DL (ref 70–110)
POCT GLUCOSE: 124 MG/DL (ref 70–110)
POCT GLUCOSE: 140 MG/DL (ref 70–110)
POCT GLUCOSE: 178 MG/DL (ref 70–110)
POTASSIUM SERPL-SCNC: 4 MMOL/L (ref 3.5–5.1)
SODIUM SERPL-SCNC: 140 MMOL/L (ref 136–145)

## 2021-04-30 PROCEDURE — 80048 BASIC METABOLIC PNL TOTAL CA: CPT | Performed by: ORTHOPAEDIC SURGERY

## 2021-04-30 PROCEDURE — 85014 HEMATOCRIT: CPT | Performed by: ORTHOPAEDIC SURGERY

## 2021-04-30 PROCEDURE — 97535 SELF CARE MNGMENT TRAINING: CPT | Mod: CO

## 2021-04-30 PROCEDURE — 63600175 PHARM REV CODE 636 W HCPCS: Performed by: ORTHOPAEDIC SURGERY

## 2021-04-30 PROCEDURE — 36415 COLL VENOUS BLD VENIPUNCTURE: CPT | Performed by: ORTHOPAEDIC SURGERY

## 2021-04-30 PROCEDURE — 97116 GAIT TRAINING THERAPY: CPT

## 2021-04-30 PROCEDURE — 97110 THERAPEUTIC EXERCISES: CPT | Mod: 59

## 2021-04-30 PROCEDURE — 94761 N-INVAS EAR/PLS OXIMETRY MLT: CPT

## 2021-04-30 PROCEDURE — 97535 SELF CARE MNGMENT TRAINING: CPT | Mod: 59

## 2021-04-30 PROCEDURE — 25000003 PHARM REV CODE 250: Performed by: ORTHOPAEDIC SURGERY

## 2021-04-30 PROCEDURE — 85018 HEMOGLOBIN: CPT | Performed by: ORTHOPAEDIC SURGERY

## 2021-04-30 PROCEDURE — 97530 THERAPEUTIC ACTIVITIES: CPT

## 2021-04-30 RX ORDER — HYDROXYZINE PAMOATE 25 MG/1
25 CAPSULE ORAL EVERY 8 HOURS PRN
Status: DISCONTINUED | OUTPATIENT
Start: 2021-04-30 | End: 2021-05-01 | Stop reason: HOSPADM

## 2021-04-30 RX ORDER — NAPROXEN SODIUM 220 MG/1
81 TABLET, FILM COATED ORAL DAILY
Refills: 0
Start: 2021-04-30 | End: 2022-05-26

## 2021-04-30 RX ADMIN — METFORMIN HYDROCHLORIDE 1000 MG: 500 TABLET ORAL at 09:04

## 2021-04-30 RX ADMIN — PIOGLITAZONE 30 MG: 15 TABLET ORAL at 09:04

## 2021-04-30 RX ADMIN — OXYCODONE HYDROCHLORIDE 10 MG: 5 TABLET ORAL at 11:04

## 2021-04-30 RX ADMIN — NAPROXEN 500 MG: 250 TABLET ORAL at 06:04

## 2021-04-30 RX ADMIN — LEVOTHYROXINE SODIUM 50 MCG: 50 TABLET ORAL at 06:04

## 2021-04-30 RX ADMIN — OXYCODONE HYDROCHLORIDE 10 MG: 5 TABLET ORAL at 03:04

## 2021-04-30 RX ADMIN — LISINOPRIL 20 MG: 20 TABLET ORAL at 09:04

## 2021-04-30 RX ADMIN — OXYCODONE HYDROCHLORIDE 10 MG: 5 TABLET ORAL at 10:04

## 2021-04-30 RX ADMIN — ESCITALOPRAM OXALATE 20 MG: 10 TABLET ORAL at 10:04

## 2021-04-30 RX ADMIN — AMITRIPTYLINE HYDROCHLORIDE 50 MG: 25 TABLET, FILM COATED ORAL at 10:04

## 2021-04-30 RX ADMIN — CEFAZOLIN SODIUM 2 G: 2 SOLUTION INTRAVENOUS at 03:04

## 2021-04-30 RX ADMIN — DOCUSATE SODIUM 50 MG AND SENNOSIDES 8.6 MG 1 TABLET: 8.6; 5 TABLET, FILM COATED ORAL at 10:04

## 2021-04-30 RX ADMIN — OXYCODONE HYDROCHLORIDE 10 MG: 5 TABLET ORAL at 06:04

## 2021-04-30 RX ADMIN — AMLODIPINE BESYLATE 5 MG: 5 TABLET ORAL at 09:04

## 2021-04-30 RX ADMIN — METFORMIN HYDROCHLORIDE 1000 MG: 500 TABLET ORAL at 06:04

## 2021-04-30 RX ADMIN — NAPROXEN 500 MG: 250 TABLET ORAL at 09:04

## 2021-04-30 RX ADMIN — ATORVASTATIN CALCIUM 40 MG: 40 TABLET, FILM COATED ORAL at 10:04

## 2021-04-30 RX ADMIN — ACETAMINOPHEN 1000 MG: 325 TABLET ORAL at 09:04

## 2021-04-30 RX ADMIN — ACETAMINOPHEN 1000 MG: 325 TABLET ORAL at 10:04

## 2021-05-01 VITALS
TEMPERATURE: 98 F | RESPIRATION RATE: 20 BRPM | OXYGEN SATURATION: 93 % | SYSTOLIC BLOOD PRESSURE: 107 MMHG | HEIGHT: 60 IN | HEART RATE: 75 BPM | WEIGHT: 209.44 LBS | DIASTOLIC BLOOD PRESSURE: 51 MMHG | BODY MASS INDEX: 41.12 KG/M2

## 2021-05-01 LAB
POCT GLUCOSE: 114 MG/DL (ref 70–110)
POCT GLUCOSE: 123 MG/DL (ref 70–110)

## 2021-05-01 PROCEDURE — 97530 THERAPEUTIC ACTIVITIES: CPT | Mod: CQ

## 2021-05-01 PROCEDURE — 25000003 PHARM REV CODE 250: Performed by: ORTHOPAEDIC SURGERY

## 2021-05-01 PROCEDURE — 94761 N-INVAS EAR/PLS OXIMETRY MLT: CPT

## 2021-05-01 PROCEDURE — 97116 GAIT TRAINING THERAPY: CPT | Mod: CQ

## 2021-05-01 RX ADMIN — ACETAMINOPHEN 1000 MG: 325 TABLET ORAL at 09:05

## 2021-05-01 RX ADMIN — NAPROXEN 500 MG: 250 TABLET ORAL at 09:05

## 2021-05-01 RX ADMIN — OXYCODONE HYDROCHLORIDE 10 MG: 5 TABLET ORAL at 03:05

## 2021-05-01 RX ADMIN — AMLODIPINE BESYLATE 5 MG: 5 TABLET ORAL at 09:05

## 2021-05-01 RX ADMIN — ACETAMINOPHEN 1000 MG: 325 TABLET ORAL at 11:05

## 2021-05-01 RX ADMIN — OXYCODONE HYDROCHLORIDE 10 MG: 5 TABLET ORAL at 09:05

## 2021-05-01 RX ADMIN — LEVOTHYROXINE SODIUM 50 MCG: 50 TABLET ORAL at 06:05

## 2021-05-01 RX ADMIN — METFORMIN HYDROCHLORIDE 1000 MG: 500 TABLET ORAL at 09:05

## 2021-05-01 RX ADMIN — PIOGLITAZONE 30 MG: 15 TABLET ORAL at 09:05

## 2021-05-02 PROCEDURE — G0180 PR HOME HEALTH MD CERTIFICATION: ICD-10-PCS | Mod: ,,, | Performed by: ORTHOPAEDIC SURGERY

## 2021-05-02 PROCEDURE — G0180 MD CERTIFICATION HHA PATIENT: HCPCS | Mod: ,,, | Performed by: ORTHOPAEDIC SURGERY

## 2021-05-06 ENCOUNTER — TELEPHONE (OUTPATIENT)
Dept: ORTHOPEDICS | Facility: CLINIC | Age: 61
End: 2021-05-06

## 2021-05-12 ENCOUNTER — TELEPHONE (OUTPATIENT)
Dept: ORTHOPEDICS | Facility: CLINIC | Age: 61
End: 2021-05-12

## 2021-05-12 RX ORDER — OXYCODONE AND ACETAMINOPHEN 5; 325 MG/1; MG/1
1 TABLET ORAL EVERY 6 HOURS PRN
Qty: 45 TABLET | Refills: 0 | Status: SHIPPED | OUTPATIENT
Start: 2021-05-12 | End: 2021-09-23

## 2021-05-14 ENCOUNTER — EXTERNAL HOME HEALTH (OUTPATIENT)
Dept: HOME HEALTH SERVICES | Facility: HOSPITAL | Age: 61
End: 2021-05-14
Payer: COMMERCIAL

## 2021-05-14 ENCOUNTER — OFFICE VISIT (OUTPATIENT)
Dept: ORTHOPEDICS | Facility: CLINIC | Age: 61
End: 2021-05-14
Payer: COMMERCIAL

## 2021-05-14 ENCOUNTER — HOSPITAL ENCOUNTER (OUTPATIENT)
Dept: RADIOLOGY | Facility: HOSPITAL | Age: 61
Discharge: HOME | End: 2021-05-14
Attending: ORTHOPAEDIC SURGERY
Payer: COMMERCIAL

## 2021-05-14 VITALS
BODY MASS INDEX: 41.12 KG/M2 | DIASTOLIC BLOOD PRESSURE: 76 MMHG | SYSTOLIC BLOOD PRESSURE: 116 MMHG | WEIGHT: 209.44 LBS | HEART RATE: 87 BPM | HEIGHT: 60 IN

## 2021-05-14 DIAGNOSIS — Z96.652 STATUS POST TOTAL LEFT KNEE REPLACEMENT: ICD-10-CM

## 2021-05-14 DIAGNOSIS — M17.12 PRIMARY OSTEOARTHRITIS OF LEFT KNEE: Primary | ICD-10-CM

## 2021-05-14 PROCEDURE — 99999 PR PBB SHADOW E&M-EST. PATIENT-LVL IV: CPT | Mod: PBBFAC,,, | Performed by: ORTHOPAEDIC SURGERY

## 2021-05-14 PROCEDURE — 1125F AMNT PAIN NOTED PAIN PRSNT: CPT | Mod: S$GLB,,, | Performed by: ORTHOPAEDIC SURGERY

## 2021-05-14 PROCEDURE — 99024 POSTOP FOLLOW-UP VISIT: CPT | Mod: S$GLB,,, | Performed by: ORTHOPAEDIC SURGERY

## 2021-05-14 PROCEDURE — 73560 X-RAY EXAM OF KNEE 1 OR 2: CPT | Mod: 26,LT,, | Performed by: RADIOLOGY

## 2021-05-14 PROCEDURE — 1125F PR PAIN SEVERITY QUANTIFIED, PAIN PRESENT: ICD-10-PCS | Mod: S$GLB,,, | Performed by: ORTHOPAEDIC SURGERY

## 2021-05-14 PROCEDURE — 3008F PR BODY MASS INDEX (BMI) DOCUMENTED: ICD-10-PCS | Mod: CPTII,S$GLB,, | Performed by: ORTHOPAEDIC SURGERY

## 2021-05-14 PROCEDURE — 99024 PR POST-OP FOLLOW-UP VISIT: ICD-10-PCS | Mod: S$GLB,,, | Performed by: ORTHOPAEDIC SURGERY

## 2021-05-14 PROCEDURE — 99999 PR PBB SHADOW E&M-EST. PATIENT-LVL IV: ICD-10-PCS | Mod: PBBFAC,,, | Performed by: ORTHOPAEDIC SURGERY

## 2021-05-14 PROCEDURE — 73560 XR KNEE 1 OR 2 VIEW LEFT: ICD-10-PCS | Mod: 26,LT,, | Performed by: RADIOLOGY

## 2021-05-14 PROCEDURE — 3008F BODY MASS INDEX DOCD: CPT | Mod: CPTII,S$GLB,, | Performed by: ORTHOPAEDIC SURGERY

## 2021-05-14 PROCEDURE — 73560 X-RAY EXAM OF KNEE 1 OR 2: CPT | Mod: TC,PN,LT

## 2021-06-09 ENCOUNTER — TELEPHONE (OUTPATIENT)
Dept: CARDIOLOGY | Facility: CLINIC | Age: 61
End: 2021-06-09

## 2021-06-18 ENCOUNTER — OFFICE VISIT (OUTPATIENT)
Dept: ORTHOPEDICS | Facility: CLINIC | Age: 61
End: 2021-06-18
Payer: COMMERCIAL

## 2021-06-18 VITALS — BODY MASS INDEX: 41.12 KG/M2 | WEIGHT: 209.44 LBS | HEIGHT: 60 IN

## 2021-06-18 DIAGNOSIS — Z96.652 STATUS POST TOTAL LEFT KNEE REPLACEMENT: Primary | ICD-10-CM

## 2021-06-18 PROCEDURE — 3008F BODY MASS INDEX DOCD: CPT | Mod: CPTII,S$GLB,, | Performed by: PHYSICIAN ASSISTANT

## 2021-06-18 PROCEDURE — 99999 PR PBB SHADOW E&M-EST. PATIENT-LVL III: CPT | Mod: PBBFAC,,, | Performed by: PHYSICIAN ASSISTANT

## 2021-06-18 PROCEDURE — 99024 PR POST-OP FOLLOW-UP VISIT: ICD-10-PCS | Mod: S$GLB,,, | Performed by: PHYSICIAN ASSISTANT

## 2021-06-18 PROCEDURE — 99999 PR PBB SHADOW E&M-EST. PATIENT-LVL III: ICD-10-PCS | Mod: PBBFAC,,, | Performed by: PHYSICIAN ASSISTANT

## 2021-06-18 PROCEDURE — 1126F AMNT PAIN NOTED NONE PRSNT: CPT | Mod: S$GLB,,, | Performed by: PHYSICIAN ASSISTANT

## 2021-06-18 PROCEDURE — 3008F PR BODY MASS INDEX (BMI) DOCUMENTED: ICD-10-PCS | Mod: CPTII,S$GLB,, | Performed by: PHYSICIAN ASSISTANT

## 2021-06-18 PROCEDURE — 1126F PR PAIN SEVERITY QUANTIFIED, NO PAIN PRESENT: ICD-10-PCS | Mod: S$GLB,,, | Performed by: PHYSICIAN ASSISTANT

## 2021-06-18 PROCEDURE — 99024 POSTOP FOLLOW-UP VISIT: CPT | Mod: S$GLB,,, | Performed by: PHYSICIAN ASSISTANT

## 2021-06-22 ENCOUNTER — HOSPITAL ENCOUNTER (OUTPATIENT)
Dept: CARDIOLOGY | Facility: HOSPITAL | Age: 61
Discharge: HOME | End: 2021-06-22
Attending: INTERNAL MEDICINE
Payer: COMMERCIAL

## 2021-06-22 VITALS — HEIGHT: 60 IN | WEIGHT: 209 LBS | BODY MASS INDEX: 41.03 KG/M2

## 2021-06-22 DIAGNOSIS — R94.31 ABNORMAL EKG: ICD-10-CM

## 2021-06-22 DIAGNOSIS — Z01.818 PRE-OPERATIVE CLEARANCE: ICD-10-CM

## 2021-06-22 LAB
AORTIC ROOT ANNULUS: 2.8 CM
ASCENDING AORTA: 2.55 CM
AV INDEX (PROSTH): 0.54
AV MEAN GRADIENT: 4 MMHG
AV PEAK GRADIENT: 8 MMHG
AV VALVE AREA: 1.73 CM2
AV VELOCITY RATIO: 0.55
BSA FOR ECHO PROCEDURE: 2 M2
CV ECHO LV RWT: 0.43 CM
DOP CALC AO PEAK VEL: 1.41 M/S
DOP CALC AO VTI: 30.87 CM
DOP CALC LVOT AREA: 3.2 CM2
DOP CALC LVOT DIAMETER: 2.03 CM
DOP CALC LVOT PEAK VEL: 0.78 M/S
DOP CALC LVOT STROKE VOLUME: 53.51 CM3
DOP CALCLVOT PEAK VEL VTI: 16.54 CM
E WAVE DECELERATION TIME: 198.81 MSEC
E/A RATIO: 0.68
E/E' RATIO: 6.93 M/S
ECHO LV POSTERIOR WALL: 0.96 CM (ref 0.6–1.1)
EJECTION FRACTION: 55 %
FRACTIONAL SHORTENING: 39 % (ref 28–44)
INTERVENTRICULAR SEPTUM: 0.87 CM (ref 0.6–1.1)
LA MAJOR: 4.34 CM
LA MINOR: 4.53 CM
LA WIDTH: 2.82 CM
LEFT ATRIUM SIZE: 3.48 CM
LEFT ATRIUM VOLUME INDEX MOD: 9.5 ML/M2
LEFT ATRIUM VOLUME INDEX: 19.5 ML/M2
LEFT ATRIUM VOLUME MOD: 18.1 CM3
LEFT ATRIUM VOLUME: 36.98 CM3
LEFT INTERNAL DIMENSION IN SYSTOLE: 2.74 CM (ref 2.1–4)
LEFT VENTRICLE DIASTOLIC VOLUME INDEX: 47.57 ML/M2
LEFT VENTRICLE DIASTOLIC VOLUME: 90.38 ML
LEFT VENTRICLE MASS INDEX: 70 G/M2
LEFT VENTRICLE SYSTOLIC VOLUME INDEX: 14.7 ML/M2
LEFT VENTRICLE SYSTOLIC VOLUME: 28.01 ML
LEFT VENTRICULAR INTERNAL DIMENSION IN DIASTOLE: 4.46 CM (ref 3.5–6)
LEFT VENTRICULAR MASS: 133.84 G
LV LATERAL E/E' RATIO: 5.78 M/S
LV SEPTAL E/E' RATIO: 8.67 M/S
MV A" WAVE DURATION": 11.13 MSEC
MV MEAN GRADIENT: 0 MMHG
MV PEAK A VEL: 0.77 M/S
MV PEAK E VEL: 0.52 M/S
MV PEAK GRADIENT: 2 MMHG
MV STENOSIS PRESSURE HALF TIME: 57.66 MS
MV VALVE AREA P 1/2 METHOD: 3.82 CM2
PISA TR MAX VEL: 2.44 M/S
PULM VEIN S/D RATIO: 1.31
PV PEAK D VEL: 0.29 M/S
PV PEAK S VEL: 0.38 M/S
PV PEAK VELOCITY: 0.95 CM/S
RA MAJOR: 4.5 CM
RA PRESSURE: 3 MMHG
RA WIDTH: 3.21 CM
RIGHT VENTRICULAR END-DIASTOLIC DIMENSION: 3.02 CM
RV TISSUE DOPPLER FREE WALL SYSTOLIC VELOCITY 1 (APICAL 4 CHAMBER VIEW): 10.54 CM/S
STJ: 2.4 CM
TDI LATERAL: 0.09 M/S
TDI SEPTAL: 0.06 M/S
TDI: 0.08 M/S
TR MAX PG: 24 MMHG
TRICUSPID ANNULAR PLANE SYSTOLIC EXCURSION: 1.82 CM
TV REST PULMONARY ARTERY PRESSURE: 27 MMHG

## 2021-06-22 PROCEDURE — 93306 ECHO (CUPID ONLY): ICD-10-PCS | Mod: 26,,, | Performed by: INTERNAL MEDICINE

## 2021-06-22 PROCEDURE — 93306 TTE W/DOPPLER COMPLETE: CPT | Mod: 26,,, | Performed by: INTERNAL MEDICINE

## 2021-06-22 PROCEDURE — 93306 TTE W/DOPPLER COMPLETE: CPT

## 2021-06-28 ENCOUNTER — OFFICE VISIT (OUTPATIENT)
Dept: CARDIOLOGY | Facility: CLINIC | Age: 61
End: 2021-06-28
Payer: COMMERCIAL

## 2021-06-28 VITALS
WEIGHT: 188.19 LBS | HEART RATE: 79 BPM | BODY MASS INDEX: 36.95 KG/M2 | DIASTOLIC BLOOD PRESSURE: 81 MMHG | OXYGEN SATURATION: 98 % | SYSTOLIC BLOOD PRESSURE: 123 MMHG | HEIGHT: 60 IN

## 2021-06-28 DIAGNOSIS — E11.69 HYPERLIPIDEMIA ASSOCIATED WITH TYPE 2 DIABETES MELLITUS: Chronic | ICD-10-CM

## 2021-06-28 DIAGNOSIS — E66.01 CLASS 3 SEVERE OBESITY DUE TO EXCESS CALORIES WITHOUT SERIOUS COMORBIDITY WITH BODY MASS INDEX (BMI) OF 40.0 TO 44.9 IN ADULT: Chronic | ICD-10-CM

## 2021-06-28 DIAGNOSIS — I10 ESSENTIAL HYPERTENSION: Primary | Chronic | ICD-10-CM

## 2021-06-28 DIAGNOSIS — E78.5 HYPERLIPIDEMIA ASSOCIATED WITH TYPE 2 DIABETES MELLITUS: Chronic | ICD-10-CM

## 2021-06-28 PROCEDURE — 3074F PR MOST RECENT SYSTOLIC BLOOD PRESSURE < 130 MM HG: ICD-10-PCS | Mod: CPTII,S$GLB,, | Performed by: INTERNAL MEDICINE

## 2021-06-28 PROCEDURE — 99214 OFFICE O/P EST MOD 30 MIN: CPT | Mod: S$GLB,,, | Performed by: INTERNAL MEDICINE

## 2021-06-28 PROCEDURE — 3079F PR MOST RECENT DIASTOLIC BLOOD PRESSURE 80-89 MM HG: ICD-10-PCS | Mod: CPTII,S$GLB,, | Performed by: INTERNAL MEDICINE

## 2021-06-28 PROCEDURE — 3079F DIAST BP 80-89 MM HG: CPT | Mod: CPTII,S$GLB,, | Performed by: INTERNAL MEDICINE

## 2021-06-28 PROCEDURE — 3008F PR BODY MASS INDEX (BMI) DOCUMENTED: ICD-10-PCS | Mod: CPTII,S$GLB,, | Performed by: INTERNAL MEDICINE

## 2021-06-28 PROCEDURE — 3008F BODY MASS INDEX DOCD: CPT | Mod: CPTII,S$GLB,, | Performed by: INTERNAL MEDICINE

## 2021-06-28 PROCEDURE — 99999 PR PBB SHADOW E&M-EST. PATIENT-LVL IV: ICD-10-PCS | Mod: PBBFAC,,, | Performed by: INTERNAL MEDICINE

## 2021-06-28 PROCEDURE — 1126F PR PAIN SEVERITY QUANTIFIED, NO PAIN PRESENT: ICD-10-PCS | Mod: S$GLB,,, | Performed by: INTERNAL MEDICINE

## 2021-06-28 PROCEDURE — 1126F AMNT PAIN NOTED NONE PRSNT: CPT | Mod: S$GLB,,, | Performed by: INTERNAL MEDICINE

## 2021-06-28 PROCEDURE — 99214 PR OFFICE/OUTPT VISIT, EST, LEVL IV, 30-39 MIN: ICD-10-PCS | Mod: S$GLB,,, | Performed by: INTERNAL MEDICINE

## 2021-06-28 PROCEDURE — 3074F SYST BP LT 130 MM HG: CPT | Mod: CPTII,S$GLB,, | Performed by: INTERNAL MEDICINE

## 2021-06-28 PROCEDURE — 99999 PR PBB SHADOW E&M-EST. PATIENT-LVL IV: CPT | Mod: PBBFAC,,, | Performed by: INTERNAL MEDICINE

## 2021-09-21 ENCOUNTER — OFFICE VISIT (OUTPATIENT)
Dept: OBSTETRICS AND GYNECOLOGY | Facility: CLINIC | Age: 61
End: 2021-09-21
Payer: COMMERCIAL

## 2021-09-21 VITALS
SYSTOLIC BLOOD PRESSURE: 122 MMHG | BODY MASS INDEX: 36.64 KG/M2 | WEIGHT: 187.63 LBS | DIASTOLIC BLOOD PRESSURE: 74 MMHG

## 2021-09-21 DIAGNOSIS — Z01.419 WELL WOMAN EXAM WITH ROUTINE GYNECOLOGICAL EXAM: Primary | ICD-10-CM

## 2021-09-21 PROCEDURE — 88175 CYTOPATH C/V AUTO FLUID REDO: CPT | Performed by: OBSTETRICS & GYNECOLOGY

## 2021-09-21 PROCEDURE — 1159F MED LIST DOCD IN RCRD: CPT | Mod: CPTII,S$GLB,, | Performed by: OBSTETRICS & GYNECOLOGY

## 2021-09-21 PROCEDURE — 99396 PREV VISIT EST AGE 40-64: CPT | Mod: S$GLB,,, | Performed by: OBSTETRICS & GYNECOLOGY

## 2021-09-21 PROCEDURE — 3078F DIAST BP <80 MM HG: CPT | Mod: CPTII,S$GLB,, | Performed by: OBSTETRICS & GYNECOLOGY

## 2021-09-21 PROCEDURE — 3074F PR MOST RECENT SYSTOLIC BLOOD PRESSURE < 130 MM HG: ICD-10-PCS | Mod: CPTII,S$GLB,, | Performed by: OBSTETRICS & GYNECOLOGY

## 2021-09-21 PROCEDURE — 3074F SYST BP LT 130 MM HG: CPT | Mod: CPTII,S$GLB,, | Performed by: OBSTETRICS & GYNECOLOGY

## 2021-09-21 PROCEDURE — 3044F HG A1C LEVEL LT 7.0%: CPT | Mod: CPTII,S$GLB,, | Performed by: OBSTETRICS & GYNECOLOGY

## 2021-09-21 PROCEDURE — 1160F RVW MEDS BY RX/DR IN RCRD: CPT | Mod: CPTII,S$GLB,, | Performed by: OBSTETRICS & GYNECOLOGY

## 2021-09-21 PROCEDURE — 1159F PR MEDICATION LIST DOCUMENTED IN MEDICAL RECORD: ICD-10-PCS | Mod: CPTII,S$GLB,, | Performed by: OBSTETRICS & GYNECOLOGY

## 2021-09-21 PROCEDURE — 4010F ACE/ARB THERAPY RXD/TAKEN: CPT | Mod: CPTII,S$GLB,, | Performed by: OBSTETRICS & GYNECOLOGY

## 2021-09-21 PROCEDURE — 3008F PR BODY MASS INDEX (BMI) DOCUMENTED: ICD-10-PCS | Mod: CPTII,S$GLB,, | Performed by: OBSTETRICS & GYNECOLOGY

## 2021-09-21 PROCEDURE — 99396 PR PREVENTIVE VISIT,EST,40-64: ICD-10-PCS | Mod: S$GLB,,, | Performed by: OBSTETRICS & GYNECOLOGY

## 2021-09-21 PROCEDURE — 99999 PR PBB SHADOW E&M-EST. PATIENT-LVL III: CPT | Mod: PBBFAC,,, | Performed by: OBSTETRICS & GYNECOLOGY

## 2021-09-21 PROCEDURE — 3044F PR MOST RECENT HEMOGLOBIN A1C LEVEL <7.0%: ICD-10-PCS | Mod: CPTII,S$GLB,, | Performed by: OBSTETRICS & GYNECOLOGY

## 2021-09-21 PROCEDURE — 1160F PR REVIEW ALL MEDS BY PRESCRIBER/CLIN PHARMACIST DOCUMENTED: ICD-10-PCS | Mod: CPTII,S$GLB,, | Performed by: OBSTETRICS & GYNECOLOGY

## 2021-09-21 PROCEDURE — 4010F PR ACE/ARB THEARPY RXD/TAKEN: ICD-10-PCS | Mod: CPTII,S$GLB,, | Performed by: OBSTETRICS & GYNECOLOGY

## 2021-09-21 PROCEDURE — 3078F PR MOST RECENT DIASTOLIC BLOOD PRESSURE < 80 MM HG: ICD-10-PCS | Mod: CPTII,S$GLB,, | Performed by: OBSTETRICS & GYNECOLOGY

## 2021-09-21 PROCEDURE — 99999 PR PBB SHADOW E&M-EST. PATIENT-LVL III: ICD-10-PCS | Mod: PBBFAC,,, | Performed by: OBSTETRICS & GYNECOLOGY

## 2021-09-21 PROCEDURE — 3008F BODY MASS INDEX DOCD: CPT | Mod: CPTII,S$GLB,, | Performed by: OBSTETRICS & GYNECOLOGY

## 2021-09-23 ENCOUNTER — OFFICE VISIT (OUTPATIENT)
Dept: ORTHOPEDICS | Facility: CLINIC | Age: 61
End: 2021-09-23
Payer: COMMERCIAL

## 2021-09-23 VITALS — BODY MASS INDEX: 36.71 KG/M2 | WEIGHT: 187 LBS | HEIGHT: 60 IN

## 2021-09-23 DIAGNOSIS — Z96.652 STATUS POST TOTAL LEFT KNEE REPLACEMENT: ICD-10-CM

## 2021-09-23 DIAGNOSIS — M17.12 PRIMARY OSTEOARTHRITIS OF LEFT KNEE: Primary | ICD-10-CM

## 2021-09-23 PROCEDURE — 1160F RVW MEDS BY RX/DR IN RCRD: CPT | Mod: CPTII,S$GLB,, | Performed by: ORTHOPAEDIC SURGERY

## 2021-09-23 PROCEDURE — 3044F PR MOST RECENT HEMOGLOBIN A1C LEVEL <7.0%: ICD-10-PCS | Mod: CPTII,S$GLB,, | Performed by: ORTHOPAEDIC SURGERY

## 2021-09-23 PROCEDURE — 3008F BODY MASS INDEX DOCD: CPT | Mod: CPTII,S$GLB,, | Performed by: ORTHOPAEDIC SURGERY

## 2021-09-23 PROCEDURE — 1159F MED LIST DOCD IN RCRD: CPT | Mod: CPTII,S$GLB,, | Performed by: ORTHOPAEDIC SURGERY

## 2021-09-23 PROCEDURE — 99212 PR OFFICE/OUTPT VISIT, EST, LEVL II, 10-19 MIN: ICD-10-PCS | Mod: S$GLB,,, | Performed by: ORTHOPAEDIC SURGERY

## 2021-09-23 PROCEDURE — 99999 PR PBB SHADOW E&M-EST. PATIENT-LVL III: CPT | Mod: PBBFAC,,, | Performed by: ORTHOPAEDIC SURGERY

## 2021-09-23 PROCEDURE — 1159F PR MEDICATION LIST DOCUMENTED IN MEDICAL RECORD: ICD-10-PCS | Mod: CPTII,S$GLB,, | Performed by: ORTHOPAEDIC SURGERY

## 2021-09-23 PROCEDURE — 4010F ACE/ARB THERAPY RXD/TAKEN: CPT | Mod: CPTII,S$GLB,, | Performed by: ORTHOPAEDIC SURGERY

## 2021-09-23 PROCEDURE — 1160F PR REVIEW ALL MEDS BY PRESCRIBER/CLIN PHARMACIST DOCUMENTED: ICD-10-PCS | Mod: CPTII,S$GLB,, | Performed by: ORTHOPAEDIC SURGERY

## 2021-09-23 PROCEDURE — 99212 OFFICE O/P EST SF 10 MIN: CPT | Mod: S$GLB,,, | Performed by: ORTHOPAEDIC SURGERY

## 2021-09-23 PROCEDURE — 99999 PR PBB SHADOW E&M-EST. PATIENT-LVL III: ICD-10-PCS | Mod: PBBFAC,,, | Performed by: ORTHOPAEDIC SURGERY

## 2021-09-23 PROCEDURE — 4010F PR ACE/ARB THEARPY RXD/TAKEN: ICD-10-PCS | Mod: CPTII,S$GLB,, | Performed by: ORTHOPAEDIC SURGERY

## 2021-09-23 PROCEDURE — 3008F PR BODY MASS INDEX (BMI) DOCUMENTED: ICD-10-PCS | Mod: CPTII,S$GLB,, | Performed by: ORTHOPAEDIC SURGERY

## 2021-09-23 PROCEDURE — 3044F HG A1C LEVEL LT 7.0%: CPT | Mod: CPTII,S$GLB,, | Performed by: ORTHOPAEDIC SURGERY

## 2021-09-24 LAB
FINAL PATHOLOGIC DIAGNOSIS: NORMAL
Lab: NORMAL

## 2022-01-28 ENCOUNTER — HOSPITAL ENCOUNTER (OUTPATIENT)
Dept: RADIOLOGY | Facility: HOSPITAL | Age: 62
Discharge: HOME | End: 2022-01-28
Attending: OBSTETRICS & GYNECOLOGY
Payer: COMMERCIAL

## 2022-01-28 DIAGNOSIS — R92.8 ABNORMAL MAMMOGRAM: ICD-10-CM

## 2022-01-28 PROCEDURE — 77062 BREAST TOMOSYNTHESIS BI: CPT | Mod: TC

## 2022-01-28 PROCEDURE — 77066 DX MAMMO INCL CAD BI: CPT | Mod: 26,,, | Performed by: RADIOLOGY

## 2022-01-28 PROCEDURE — 77062 MAMMO DIGITAL DIAGNOSTIC BILAT WITH TOMO: ICD-10-PCS | Mod: 26,,, | Performed by: RADIOLOGY

## 2022-01-28 PROCEDURE — 77066 MAMMO DIGITAL DIAGNOSTIC BILAT WITH TOMO: ICD-10-PCS | Mod: 26,,, | Performed by: RADIOLOGY

## 2022-01-28 PROCEDURE — 77062 BREAST TOMOSYNTHESIS BI: CPT | Mod: 26,,, | Performed by: RADIOLOGY

## 2022-04-19 ENCOUNTER — TELEPHONE (OUTPATIENT)
Dept: ENDOSCOPY | Facility: HOSPITAL | Age: 62
End: 2022-04-19
Payer: COMMERCIAL

## 2022-04-19 ENCOUNTER — TELEPHONE (OUTPATIENT)
Dept: NEUROLOGY | Facility: HOSPITAL | Age: 62
End: 2022-04-19
Payer: COMMERCIAL

## 2022-04-19 DIAGNOSIS — R19.5 POSITIVE COLORECTAL CANCER SCREENING USING COLOGUARD TEST: Primary | ICD-10-CM

## 2022-04-19 NOTE — TELEPHONE ENCOUNTER
Pt requesting to schedule new pt appt.  Pt given scheduling number of [phone] to obtain appt.

## 2022-04-19 NOTE — TELEPHONE ENCOUNTER
Endoscopy Scheduling Questionnaire:         1. Are you taking any blood thinners? n               If Yes  Have you been on them for longer than one year?     2. Have you been diagnosed with Diverticulitis in past three months?      3. Are you on dialysis or have Kidney Disease? n    4. Previous Colonoscopy?  y         If yes Do you have a history of colon polyps?  y      6. Are you a diabetic?  y    7. Do you have a history of constipation?  n    Procedure scheduled with   on  05/17/2022    The prep being used is Golytely     The patient's prep instructions were sent by My Chart

## 2022-04-19 NOTE — TELEPHONE ENCOUNTER
----- Message from Marielle Batista sent at 4/19/2022 10:34 AM CDT -----  Contact: [phone]  Type:  Needs Medical Advice    Who Called: pt called  Would the patient rather a call back or a response via [facility]? callback  Best Call Back Number: [phone]  Additional Information: Needs to schedule appt. Please call pt to advice

## 2022-04-22 ENCOUNTER — HOSPITAL ENCOUNTER (EMERGENCY)
Facility: HOSPITAL | Age: 62
Discharge: HOME | End: 2022-04-22
Attending: EMERGENCY MEDICINE
Payer: COMMERCIAL

## 2022-04-22 VITALS
RESPIRATION RATE: 20 BRPM | TEMPERATURE: 98 F | BODY MASS INDEX: 29.73 KG/M2 | OXYGEN SATURATION: 97 % | DIASTOLIC BLOOD PRESSURE: 78 MMHG | WEIGHT: 185 LBS | HEART RATE: 88 BPM | HEIGHT: 66 IN | SYSTOLIC BLOOD PRESSURE: 134 MMHG
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
## 2022-04-22 DIAGNOSIS — S42.221A CLOSED 2-PART DISPLACED FRACTURE OF SURGICAL NECK OF RIGHT HUMERUS, INITIAL ENCOUNTER: Primary | ICD-10-CM

## 2022-04-22 DIAGNOSIS — M79.601 RIGHT ARM PAIN: ICD-10-CM

## 2022-04-22 DIAGNOSIS — M25.511 RIGHT SHOULDER PAIN: ICD-10-CM

## 2022-04-22 PROCEDURE — 96375 TX/PRO/DX INJ NEW DRUG ADDON: CPT

## 2022-04-22 PROCEDURE — 96376 TX/PRO/DX INJ SAME DRUG ADON: CPT

## 2022-04-22 PROCEDURE — 96361 HYDRATE IV INFUSION ADD-ON: CPT

## 2022-04-22 PROCEDURE — 96374 THER/PROPH/DIAG INJ IV PUSH: CPT

## 2022-04-22 PROCEDURE — 25000003 PHARM REV CODE 250: Performed by: EMERGENCY MEDICINE

## 2022-04-22 PROCEDURE — 99284 EMERGENCY DEPT VISIT MOD MDM: CPT | Mod: 25

## 2022-04-22 PROCEDURE — 63600175 PHARM REV CODE 636 W HCPCS: Performed by: EMERGENCY MEDICINE

## 2022-04-22 RX ORDER — HYDROMORPHONE HYDROCHLORIDE 1 MG/ML
1 INJECTION, SOLUTION INTRAMUSCULAR; INTRAVENOUS; SUBCUTANEOUS
Status: COMPLETED | OUTPATIENT
Start: 2022-04-22 | End: 2022-04-22

## 2022-04-22 RX ORDER — MORPHINE SULFATE 4 MG/ML
4 INJECTION, SOLUTION INTRAMUSCULAR; INTRAVENOUS
Status: COMPLETED | OUTPATIENT
Start: 2022-04-22 | End: 2022-04-22

## 2022-04-22 RX ORDER — ONDANSETRON 2 MG/ML
4 INJECTION INTRAMUSCULAR; INTRAVENOUS
Status: COMPLETED | OUTPATIENT
Start: 2022-04-22 | End: 2022-04-22

## 2022-04-22 RX ORDER — OXYCODONE AND ACETAMINOPHEN 5; 325 MG/1; MG/1
1 TABLET ORAL EVERY 6 HOURS PRN
Qty: 12 TABLET | Refills: 0 | Status: SHIPPED | OUTPATIENT
Start: 2022-04-22 | End: 2022-04-26 | Stop reason: SDUPTHER

## 2022-04-22 RX ADMIN — ONDANSETRON 4 MG: 2 INJECTION INTRAMUSCULAR; INTRAVENOUS at 02:04

## 2022-04-22 RX ADMIN — SODIUM CHLORIDE 500 ML: 9 INJECTION, SOLUTION INTRAVENOUS at 03:04

## 2022-04-22 RX ADMIN — MORPHINE SULFATE 4 MG: 4 INJECTION INTRAVENOUS at 02:04

## 2022-04-22 RX ADMIN — HYDROMORPHONE HYDROCHLORIDE 1 MG: 1 INJECTION, SOLUTION INTRAMUSCULAR; INTRAVENOUS; SUBCUTANEOUS at 07:04

## 2022-04-22 RX ADMIN — HYDROMORPHONE HYDROCHLORIDE 1 MG: 1 INJECTION, SOLUTION INTRAMUSCULAR; INTRAVENOUS; SUBCUTANEOUS at 04:04

## 2022-04-22 NOTE — DISCHARGE INSTRUCTIONS
Keep your shoulder in the arm sling at all times.  Call Orthopedics Clinic for a follow-up appointment.  You may take Tylenol/ibuprofen as packaging indicates for continued pain/swelling.  You may take Percocet as directed for severe pain.  Return to the ER for severe/uncontrolled pain, arm/hand numbness, or any other concerns.

## 2022-04-22 NOTE — ED PROVIDER NOTES
Encounter Date: 2022       History     Chief Complaint   Patient presents with    Fall     Slipped and fell just PTA - presents with c/o pain right shoulder and upper arm - sling applied per EMS. + distal pulses. Painful ROM. Denies other injury.     Etta Irwin is a 62 y.o. female who  has a past medical history of Depression, Diabetes mellitus, Hyperlipidemia, Hypertension, and Osteopenia.    The patient presents to the ED due to R shoulder pain.  She lost her balance and fell onto her R shoulder.  Denies head impact, LOC, back pain, or additional injuries.  No prior injury or surgery to the R shoulder or arm.   She presents via EMS in a sling. She was given 100 ug Fentanyl prior to arrival. Denies any allergies.          Review of patient's allergies indicates:  No Known Allergies  Past Medical History:   Diagnosis Date    Depression     Diabetes mellitus     Hyperlipidemia     Hypertension     Osteopenia      Past Surgical History:   Procedure Laterality Date     SECTION      CHOLECYSTECTOMY      Laparoscopic    KNEE ARTHROPLASTY Left 2021    Procedure: ARTHROPLASTY, KNEE;  Surgeon: Jovi Valdovinos MD;  Location: [facility];  Service: Orthopedics;  Laterality: Left;  Jomar notified 21 CC  Elías bravo/ Jomar confirmed 21 MN     Family History   Problem Relation Age of Onset    Uterine cancer Mother      Social History     Tobacco Use    Smoking status: Never Smoker    Smokeless tobacco: Never Used   Substance Use Topics    Alcohol use: No    Drug use: No     Review of Systems   Constitutional: Negative for chills and fever.   HENT: Negative for sore throat.    Respiratory: Negative for cough and shortness of breath.    Cardiovascular: Negative for chest pain.   Gastrointestinal: Negative for nausea and vomiting.   Genitourinary: Negative for dysuria, frequency and urgency.   Musculoskeletal: Positive for arthralgias and myalgias. Negative for back pain, neck pain and  neck stiffness.   Skin: Negative for rash and wound.   Neurological: Negative for syncope and weakness.   Hematological: Does not bruise/bleed easily.   Psychiatric/Behavioral: Negative for agitation, behavioral problems and confusion.       Physical Exam     Initial Vitals [04/22/22 1401]   BP Pulse Resp Temp SpO2   (!) 111/55 74 16 97 °F (36.1 °C) 97 %      MAP       --         Physical Exam    Nursing note and vitals reviewed.  Constitutional: She appears well-developed and well-nourished. She is not diaphoretic. No distress.   HENT:   Head: Normocephalic and atraumatic.   Mouth/Throat: Oropharynx is clear and moist.   Eyes: EOM are normal. Pupils are equal, round, and reactive to light.   Neck: No tracheal deviation present.   Cardiovascular: Normal rate, regular rhythm, normal heart sounds and intact distal pulses.   Pulmonary/Chest: Breath sounds normal. No stridor. No respiratory distress. She has no wheezes.   Abdominal: Abdomen is soft. Bowel sounds are normal. She exhibits no distension and no mass. There is no abdominal tenderness.   Musculoskeletal:         General: No edema.      Right shoulder: Swelling, deformity, tenderness and bony tenderness present. No laceration or crepitus. Decreased range of motion. Normal strength. Normal pulse.      Comments: R shoulder tenderness, swelling.   Full ROM R elbow, forearm, wrist, hand without tenderness.   Sensation intact. Distal pulses and perfusion brisk.      Neurological: She is alert and oriented to person, place, and time. She has normal strength. No cranial nerve deficit or sensory deficit.   Skin: Skin is warm and dry. Capillary refill takes less than 2 seconds. No pallor.   Psychiatric: She has a normal mood and affect. Her behavior is normal. Thought content normal.         ED Course   Procedures  Labs Reviewed - No data to display       Imaging Results          CT Shoulder Without Contrast Right (Final result)  Result time 04/22/22 18:18:25    Final  result by Dmitriy Morgan DO (04/22/22 18:18:25)                 Impression:      Neer 2 part fracture of the right proximal humerus as above.      Electronically signed by: Dmitriy Morgan  Date:    04/22/2022  Time:    18:18             Narrative:    EXAMINATION:  CT SHOULDER WITHOUT CONTRAST RIGHT    CLINICAL HISTORY:  Shoulder trauma, fracture of humerus or scapula;    TECHNIQUE:  Axial CT images of the right shoulder with sagittal and coronal reformats without intravenous contrast.  3D reconstructions were performed on a separate workstation.    COMPARISON:  Radiographs from earlier the same date.    FINDINGS:  Bone: There is a severely comminuted and moderately displaced fracture of the right proximal humerus involving the humeral head, greater tuberosity, and surgical neck.  Only the surgical neck fracture demonstrates greater than 1 cm displacement (Neer 2 part fracture).  Remaining visualized osseous structures are intact. No additional fractures are seen.    Articulations: There is a moderate amount of joint fluid in the right glenohumeral joint.  Cartilage spaces appear maintained.    Soft tissues: There is no significant soft tissue edema.  There is no evidence of a large focal fluid collection.    Miscellaneous: There is no axillary lymphadenopathy.  Partially imaged portions of the right lung are grossly clear.                                X-Ray Humerus 2 View Right (Final result)  Result time 04/22/22 16:00:39    Final result by Isrrael Akins MD (04/22/22 16:00:39)                 Impression:      Acute fracture of the right humeral neck and head with associated displacement.  Limited visualization.  Recommend orthopedic follow-up.    This report was flagged in Epic as abnormal.      Electronically signed by: Isrrael Akins  Date:    04/22/2022  Time:    16:00             Narrative:    EXAMINATION:  XR HUMERUS 2 VIEW RIGHT    CLINICAL HISTORY:  Pain in right arm    TECHNIQUE:  Single-view right  humerus    COMPARISON:  04/22/2022    FINDINGS:  Acute fracture of the right humeral neck with associated displacement.  Fracture of the humeral head also.  There is mild displacement of the humeral head.  Limited visualization.    AC joint appears intact.  Right hemithorax is clear.  Mid and distal humerus appear intact on limited visualization.    Orthopedic follow-up recommended.                                X-Ray Shoulder Trauma Right (Final result)  Result time 04/22/22 16:01:50    Final result by Isrrael Camarena MD (04/22/22 16:01:50)                 Impression:      Acute fracture of the right humeral neck and head with associated displacement. Limited visualization. Recommend orthopedic follow-up.    This report was flagged in Epic as abnormal.      Electronically signed by: Isrrael Camarena  Date:    04/22/2022  Time:    16:01             Narrative:    EXAMINATION:  XR SHOULDER TRAUMA 3 VIEW RIGHT    CLINICAL HISTORY:  Pain in right shoulder    TECHNIQUE:  Single-view right shoulder    COMPARISON:  None    FINDINGS:  Acute fracture of the right humeral neck with associated displacement. Fracture of the humeral head also with mild comminution.  There is mild displacement of the humeral head. Limited visualization.    AC joint appears intact. Right hemithorax is clear.    Orthopedic follow-up recommended.                                 Medications   morphine injection 4 mg (4 mg Intravenous Given 4/22/22 1452)   ondansetron injection 4 mg (4 mg Intravenous Given 4/22/22 1452)   sodium chloride 0.9% bolus 500 mL (0 mLs Intravenous Stopped 4/22/22 1640)   HYDROmorphone injection 1 mg (1 mg Intravenous Given 4/22/22 1634)   HYDROmorphone injection 1 mg (1 mg Intravenous Given 4/22/22 1921)     Medical Decision Making:   Initial Assessment:   63 yo F with R shoulder pain after fall.  Will obtain x-ray, treat pain, and reassess.  Differential Diagnosis:   Differential Diagnosis includes, but is not limited  to:  Fracture, dislocation, compartment syndrome, nerve injury/palsy, vascular injury, DVT, rhabdomyolysis, hemarthrosis, septic joint, cellulitis, bursitis, muscle strain, ligament tear/sprain, laceration, foreign body, abrasion, soft tissue contusion, osteoarthritis.    Clinical Tests:   Radiological Study: Ordered and Reviewed  ED Management:  X-rays reveal comminuted and displaced R humeral head and neck fracture.   Ortho contacted and discussed, recommend CT, which confirmed fracture without dislocation.   Recommend non-surgical intervention, sling, and will schedule OP f/u in clinic for further management.   Pain medication, f/u clinic info provided.    On re-evaluation, the patient's status has improved.  After complete ED evaluation, clinical impression is most consistent with R humeral neck fracture.  Ortho follow-up within 3-5 days was recommended.    After taking into careful account the patient's history, physical exam findings, as well as empirical and objective data obtained throughout ED workup, I feel no emergent medical condition has been identified. No further evaluation or admission was felt to be required, and the patient is stable for discharge from the ED. The patient and any additional family present were updated with test results, overall clinical impression, and recommended further plan of care, including discharge instructions as provided and outpatient follow-up for continued evaluation and management as needed. All questions were answered. The patient expressed understanding and agreed with current plan for discharge and follow-up plan of care. Strict ED return precautions were provided, including return/worsening of current symptoms, new symptoms, or any other concerns.                        Clinical Impression:   Final diagnoses:  [M25.511] Right shoulder pain  [M79.601] Right arm pain  [S42.221A] Closed 2-part displaced fracture of surgical neck of right humerus, initial encounter  (Primary)  [W19.XXXA] Fall from standing, initial encounter          ED Disposition Condition    Discharge Stable        ED Prescriptions     Medication Sig Dispense Start Date End Date Auth. Provider    oxyCODONE-acetaminophen (PERCOCET) 5-325 mg per tablet Take 1 tablet by mouth every 6 (six) hours as needed for Pain (severe pain). 12 tablet 4/22/2022 4/25/2022 Dillon Hurley MD        Follow-up Information     Follow up With Specialties Details Why Contact Info    Dwain Galaviz MD Orthopedic Surgery Schedule an appointment as soon as possible for a visit   [address]  [address]  [facility] [id number] [phone]             Dillon Hurley MD  04/23/22 1710

## 2022-04-23 NOTE — CONSULTS
[facility] Consult Note    Consult: Right 2 part proximal humerus fracture     Date of Injury: 22    HPI:  62 y.o. female hx of DM and HTN who fell from standing today onto her right shoulder because she was holding her grandbaby in her left arm and protected the baby during her fall. Had immediate pain and was unable to range the right shoulder. Denies any prior injuries to the right shoulder in the past. Denies any numbness, tingling, or motor loss. No open wounds. She is retired and lives with her . Does not smoke. Pain is controlled on pain medications. Denies any pain elsewhere. No other traumatic musculoskeletal injuries.     PMH:   Past Medical History:   Diagnosis Date    Depression     Diabetes mellitus     Hyperlipidemia     Hypertension     Osteopenia        PSH:    has a past surgical history that includes  section; Cholecystectomy; and Knee Arthroplasty (Left, 2021).    SOCIAL:    reports that she has never smoked. She has never used smokeless tobacco. She reports that she does not drink alcohol and does not use drugs.    FAMILY:  Family History   Problem Relation Age of Onset    Uterine cancer Mother        MEDS:   No current facility-administered medications on file prior to encounter.     Current Outpatient Medications on File Prior to Encounter   Medication Sig Dispense Refill    alendronate (FOSAMAX) 70 MG tablet Take 1 tablet by mouth once daily.      amitriptyline (ELAVIL) 50 MG tablet Take 1 tablet by mouth every evening.       aspirin 81 MG Chew Take 1 tablet (81 mg total) by mouth once daily.  0    atorvastatin (LIPITOR) 40 MG tablet Take 1 tablet by mouth every evening.       escitalopram oxalate (LEXAPRO) 20 MG tablet Take 1 tablet by mouth every evening.       fish oil-omega-3 fatty acids 300-1,000 mg capsule Take 2 g by mouth once daily.      levothyroxine (SYNTHROID) 50 MCG tablet TK 1 T PO QD  0    lisinopril (PRINIVIL,ZESTRIL) 20 MG  tablet Take 5 mg by mouth once daily.       metFORMIN (GLUCOPHAGE) 1000 MG tablet TK 1 T PO  BID  0    multivitamin capsule Take 1 capsule by mouth once daily.      pioglitazone (ACTOS) 30 MG tablet Take 1 tablet by mouth once daily.      vitamin D (VITAMIN D3) 1000 units Tab Take 1,000 Units by mouth once daily.      vitamin E 400 UNIT capsule Take 400 Units by mouth once daily.         ALLERGY:   Allergies as of 04/22/2022    (No Known Allergies)       Vitals:    Vitals:    04/22/22 1837   BP:    Pulse: 83   Resp: 19   Temp: 98.3 °F (36.8 °C)       Labs:  No results for input(s): WBC, HGB, HCT, PLT, MCV, RDW, NA, K, CL, CO2, BUN, CREATININE, GLU, PROT, ALBUMIN, BILITOT, AST, ALKPHOS, ALT in the last 168 hours.    Coags:   No results found for: INR, PROTIME, APTT    Infection:  No results for input(s): WBC, CRP, ESR in the last 168 hours.    Physical Exam:    Gen: A/Ox3, NAD  Card: RR by RP  Lungs: nonlabored breathing, symmetric chest rise  Abd: S/NT/ND    RUE  No open wounds or superficial abrasions to the right shoulder, arm, or forearm  Some swelling noted about the shoulder  TTP about the shoulder and with attempted movement of the right arm  Able to flicker her deltoid muscle but with pain  Limited ROM secondary to pain  Sensation intact to Ax/MSC/M/U/R  Motor intact M/U/R  RP 2+      Radiology:  Xray right shoulder: Limited one view xray due to patient being in pain and unable to tolerate xrays, but on available images 2 part proximal humerus noted with displacement at the surgical neck and fracture through greater tuberosity but displacement <5mm    CT right shoulder:   Comminuted proximal humerus fracture with displacement at the surgical neck (2 part), fracture line through greater tuberosity but >5mm displacement. No dislocation. No head split.     Assessment/Plan:  62 y.o. female hx of DM and HTN who fell from standing today onto her right shoulder sustaining a right 2 part proximal humerus  fracture with comminuted portions.     No acute orthopedic intervention at this time   Recommend sling and non opeartive management at this time  Pain per ED  Ice for swelling  NWB LUE  Follow up with Dr. [facility] in 2 weeks.     Charles Centeno MD   [facility], PGY-3

## 2022-04-26 ENCOUNTER — PATIENT MESSAGE (OUTPATIENT)
Dept: ADMINISTRATIVE | Facility: OTHER | Age: 62
End: 2022-04-26
Payer: COMMERCIAL

## 2022-04-26 ENCOUNTER — LAB VISIT (OUTPATIENT)
Dept: LAB | Facility: HOSPITAL | Age: 62
End: 2022-04-26
Attending: ORTHOPAEDIC SURGERY
Payer: COMMERCIAL

## 2022-04-26 ENCOUNTER — OFFICE VISIT (OUTPATIENT)
Dept: ORTHOPEDICS | Facility: CLINIC | Age: 62
End: 2022-04-26
Payer: COMMERCIAL

## 2022-04-26 VITALS
BODY MASS INDEX: 31.2 KG/M2 | WEIGHT: 194.13 LBS | DIASTOLIC BLOOD PRESSURE: 59 MMHG | SYSTOLIC BLOOD PRESSURE: 84 MMHG | HEART RATE: 94 BPM | HEIGHT: 66 IN

## 2022-04-26 DIAGNOSIS — Z01.818 PRE-OP TESTING: ICD-10-CM

## 2022-04-26 DIAGNOSIS — S42.201A CLOSED FRACTURE OF PROXIMAL END OF RIGHT HUMERUS, UNSPECIFIED FRACTURE MORPHOLOGY, INITIAL ENCOUNTER: Primary | ICD-10-CM

## 2022-04-26 LAB
ANION GAP SERPL CALC-SCNC: 11 MMOL/L (ref 8–16)
BASOPHILS # BLD AUTO: 0.01 K/UL (ref 0–0.2)
BASOPHILS NFR BLD: 0.1 % (ref 0–1.9)
BUN SERPL-MCNC: 19 MG/DL (ref 8–23)
CALCIUM SERPL-MCNC: 9.5 MG/DL (ref 8.7–10.5)
CHLORIDE SERPL-SCNC: 103 MMOL/L (ref 95–110)
CO2 SERPL-SCNC: 28 MMOL/L (ref 23–29)
CREAT SERPL-MCNC: 0.7 MG/DL (ref 0.5–1.4)
DIFFERENTIAL METHOD: ABNORMAL
EOSINOPHIL # BLD AUTO: 0.2 K/UL (ref 0–0.5)
EOSINOPHIL NFR BLD: 2.5 % (ref 0–8)
ERYTHROCYTE [DISTWIDTH] IN BLOOD BY AUTOMATED COUNT: 13.5 % (ref 11.5–14.5)
EST. GFR  (AFRICAN AMERICAN): >60 ML/MIN/1.73 M^2
EST. GFR  (NON AFRICAN AMERICAN): >60 ML/MIN/1.73 M^2
GLUCOSE SERPL-MCNC: 115 MG/DL (ref 70–110)
HCT VFR BLD AUTO: 36.4 % (ref 37–48.5)
HGB BLD-MCNC: 11.5 G/DL (ref 12–16)
IMM GRANULOCYTES # BLD AUTO: 0.05 K/UL (ref 0–0.04)
IMM GRANULOCYTES NFR BLD AUTO: 0.7 % (ref 0–0.5)
LYMPHOCYTES # BLD AUTO: 1.6 K/UL (ref 1–4.8)
LYMPHOCYTES NFR BLD: 23 % (ref 18–48)
MCH RBC QN AUTO: 29.6 PG (ref 27–31)
MCHC RBC AUTO-ENTMCNC: 31.6 G/DL (ref 32–36)
MCV RBC AUTO: 94 FL (ref 82–98)
MONOCYTES # BLD AUTO: 0.6 K/UL (ref 0.3–1)
MONOCYTES NFR BLD: 8 % (ref 4–15)
NEUTROPHILS # BLD AUTO: 4.7 K/UL (ref 1.8–7.7)
NEUTROPHILS NFR BLD: 65.7 % (ref 38–73)
NRBC BLD-RTO: 0 /100 WBC
PLATELET # BLD AUTO: 241 K/UL (ref 150–450)
PMV BLD AUTO: 11.3 FL (ref 9.2–12.9)
POTASSIUM SERPL-SCNC: 3.9 MMOL/L (ref 3.5–5.1)
RBC # BLD AUTO: 3.89 M/UL (ref 4–5.4)
SODIUM SERPL-SCNC: 142 MMOL/L (ref 136–145)
WBC # BLD AUTO: 7.13 K/UL (ref 3.9–12.7)

## 2022-04-26 PROCEDURE — 3078F PR MOST RECENT DIASTOLIC BLOOD PRESSURE < 80 MM HG: ICD-10-PCS | Mod: CPTII,S$GLB,, | Performed by: ORTHOPAEDIC SURGERY

## 2022-04-26 PROCEDURE — 93010 ELECTROCARDIOGRAM REPORT: CPT | Mod: ,,, | Performed by: INTERNAL MEDICINE

## 2022-04-26 PROCEDURE — 3008F BODY MASS INDEX DOCD: CPT | Mod: CPTII,S$GLB,, | Performed by: ORTHOPAEDIC SURGERY

## 2022-04-26 PROCEDURE — 3074F PR MOST RECENT SYSTOLIC BLOOD PRESSURE < 130 MM HG: ICD-10-PCS | Mod: CPTII,S$GLB,, | Performed by: ORTHOPAEDIC SURGERY

## 2022-04-26 PROCEDURE — 93010 EKG 12-LEAD: ICD-10-PCS | Mod: ,,, | Performed by: INTERNAL MEDICINE

## 2022-04-26 PROCEDURE — 99999 PR PBB SHADOW E&M-EST. PATIENT-LVL V: ICD-10-PCS | Mod: PBBFAC,,, | Performed by: ORTHOPAEDIC SURGERY

## 2022-04-26 PROCEDURE — 1159F MED LIST DOCD IN RCRD: CPT | Mod: CPTII,S$GLB,, | Performed by: ORTHOPAEDIC SURGERY

## 2022-04-26 PROCEDURE — 85025 COMPLETE CBC W/AUTO DIFF WBC: CPT | Performed by: ORTHOPAEDIC SURGERY

## 2022-04-26 PROCEDURE — 99214 PR OFFICE/OUTPT VISIT, EST, LEVL IV, 30-39 MIN: ICD-10-PCS | Mod: S$GLB,,, | Performed by: ORTHOPAEDIC SURGERY

## 2022-04-26 PROCEDURE — 99214 OFFICE O/P EST MOD 30 MIN: CPT | Mod: S$GLB,,, | Performed by: ORTHOPAEDIC SURGERY

## 2022-04-26 PROCEDURE — 3074F SYST BP LT 130 MM HG: CPT | Mod: CPTII,S$GLB,, | Performed by: ORTHOPAEDIC SURGERY

## 2022-04-26 PROCEDURE — 36415 COLL VENOUS BLD VENIPUNCTURE: CPT | Performed by: ORTHOPAEDIC SURGERY

## 2022-04-26 PROCEDURE — 99999 PR PBB SHADOW E&M-EST. PATIENT-LVL V: CPT | Mod: PBBFAC,,, | Performed by: ORTHOPAEDIC SURGERY

## 2022-04-26 PROCEDURE — 4010F ACE/ARB THERAPY RXD/TAKEN: CPT | Mod: CPTII,S$GLB,, | Performed by: ORTHOPAEDIC SURGERY

## 2022-04-26 PROCEDURE — 3008F PR BODY MASS INDEX (BMI) DOCUMENTED: ICD-10-PCS | Mod: CPTII,S$GLB,, | Performed by: ORTHOPAEDIC SURGERY

## 2022-04-26 PROCEDURE — 1160F PR REVIEW ALL MEDS BY PRESCRIBER/CLIN PHARMACIST DOCUMENTED: ICD-10-PCS | Mod: CPTII,S$GLB,, | Performed by: ORTHOPAEDIC SURGERY

## 2022-04-26 PROCEDURE — 1159F PR MEDICATION LIST DOCUMENTED IN MEDICAL RECORD: ICD-10-PCS | Mod: CPTII,S$GLB,, | Performed by: ORTHOPAEDIC SURGERY

## 2022-04-26 PROCEDURE — 4010F PR ACE/ARB THEARPY RXD/TAKEN: ICD-10-PCS | Mod: CPTII,S$GLB,, | Performed by: ORTHOPAEDIC SURGERY

## 2022-04-26 PROCEDURE — 3078F DIAST BP <80 MM HG: CPT | Mod: CPTII,S$GLB,, | Performed by: ORTHOPAEDIC SURGERY

## 2022-04-26 PROCEDURE — 93005 ELECTROCARDIOGRAM TRACING: CPT

## 2022-04-26 PROCEDURE — 80048 BASIC METABOLIC PNL TOTAL CA: CPT | Performed by: ORTHOPAEDIC SURGERY

## 2022-04-26 PROCEDURE — 1160F RVW MEDS BY RX/DR IN RCRD: CPT | Mod: CPTII,S$GLB,, | Performed by: ORTHOPAEDIC SURGERY

## 2022-04-26 RX ORDER — CEFAZOLIN SODIUM 2 G/50ML
2 SOLUTION INTRAVENOUS
Status: CANCELLED | OUTPATIENT
Start: 2022-04-26

## 2022-04-26 RX ORDER — MUPIROCIN 20 MG/G
OINTMENT TOPICAL
Status: CANCELLED | OUTPATIENT
Start: 2022-04-26

## 2022-04-26 RX ORDER — AMLODIPINE BESYLATE 5 MG/1
5 TABLET ORAL DAILY
COMMUNITY
Start: 2022-03-07

## 2022-04-26 RX ORDER — OXYCODONE AND ACETAMINOPHEN 5; 325 MG/1; MG/1
1 TABLET ORAL EVERY 6 HOURS PRN
Qty: 12 TABLET | Refills: 0 | OUTPATIENT
Start: 2022-04-26 | End: 2022-04-28

## 2022-04-26 RX ORDER — SODIUM CHLORIDE 9 MG/ML
INJECTION, SOLUTION INTRAVENOUS CONTINUOUS
Status: CANCELLED | OUTPATIENT
Start: 2022-04-26

## 2022-04-26 NOTE — PROGRESS NOTES
Patient ID:   Etta Irwin is a 62 y.o. female.    Chief Complaint:   Right proximal humerus fracture    HPI:   The patient is a 62-year-old female who is presenting today after sustaining a fracture of the right proximal humerus last Friday.  She is a right-hand-dominant female.  She denies any prior issues with the right shoulder.  Her pain level today is 7/10.  She is currently taking Percocet.  She did present to the emergency room where x-rays did reveal the fracture.  She is currently in a sling.  She denies numbness or paresthesias in her right upper extremity.    Medications:    Current Outpatient Medications:     alendronate (FOSAMAX) 70 MG tablet, Take 1 tablet by mouth once daily., Disp: , Rfl:     amitriptyline (ELAVIL) 50 MG tablet, Take 1 tablet by mouth every evening. , Disp: , Rfl:     amLODIPine (NORVASC) 5 MG tablet, Take 5 mg by mouth once daily., Disp: , Rfl:     atorvastatin (LIPITOR) 40 MG tablet, Take 1 tablet by mouth every evening. , Disp: , Rfl:     escitalopram oxalate (LEXAPRO) 20 MG tablet, Take 1 tablet by mouth every evening. , Disp: , Rfl:     fish oil-omega-3 fatty acids 300-1,000 mg capsule, Take 2 g by mouth once daily., Disp: , Rfl:     levothyroxine (SYNTHROID) 50 MCG tablet, TK 1 T PO QD, Disp: , Rfl: 0    lisinopril (PRINIVIL,ZESTRIL) 20 MG tablet, Take 5 mg by mouth once daily. , Disp: , Rfl:     metFORMIN (GLUCOPHAGE) 1000 MG tablet, TK 1 T PO  BID, Disp: , Rfl: 0    multivitamin capsule, Take 1 capsule by mouth once daily., Disp: , Rfl:     pioglitazone (ACTOS) 30 MG tablet, Take 1 tablet by mouth once daily., Disp: , Rfl:     vitamin D (VITAMIN D3) 1000 units Tab, Take 1,000 Units by mouth once daily., Disp: , Rfl:     vitamin E 400 UNIT capsule, Take 400 Units by mouth once daily., Disp: , Rfl:     aspirin 81 MG Chew, Take 1 tablet (81 mg total) by mouth once daily., Disp: , Rfl: 0    oxyCODONE-acetaminophen (PERCOCET) 5-325 mg per tablet, Take 1  "tablet by mouth every 6 (six) hours as needed for Pain (severe pain)., Disp: 12 tablet, Rfl: 0    Allergies:  Review of patient's allergies indicates:  No Known Allergies    Past Medical History:  Past Medical History:   Diagnosis Date    Depression     Diabetes mellitus     Hyperlipidemia     Hypertension     Osteopenia         Past Surgical History:  Past Surgical History:   Procedure Laterality Date     SECTION      CHOLECYSTECTOMY      Laparoscopic    KNEE ARTHROPLASTY Left 2021    Procedure: ARTHROPLASTY, KNEE;  Surgeon: Jovi Valdovinos MD;  [facility];  Service: Orthopedics;  Laterality: Left;  Depuy notified 21 CC  Elías w/ Depuy confirmed 21 MN       Social History:  Social History     Occupational History    Not on file   Tobacco Use    Smoking status: Never Smoker    Smokeless tobacco: Never Used   Substance and Sexual Activity    Alcohol use: No    Drug use: No    Sexual activity: Yes     Partners: Male       Family History:  Family History   Problem Relation Age of Onset    Uterine cancer Mother         ROS:  Review of Systems   Musculoskeletal: Positive for falls, joint pain, muscle weakness and myalgias.   Neurological: Negative for numbness and paresthesias.   All other systems reviewed and are negative.      Vitals:  BP (!) 84/59 (BP Location: Left arm, Patient Position: Sitting, BP Method: Large (Automatic))   Pulse 94   Ht 5' 6" (1.676 m)   Wt 88 kg (194 lb 1.8 oz)   LMP  (LMP Unknown)   BMI 31.33 kg/m²     Physical Examination:  Comprehensive Orthopaedic Musculoskeletal Exam    General        Constitutional: appears stated age, mildly obese, well-developed and well-nourished    Scleral icterus: no    Labored breathing: no    Psychiatric: normal mood and affect and no acute distress    Neurological: alert and oriented x3    Skin: intact    Lymphadenopathy: none     Ortho Exam   Right shoulder exam:  There is ecchymosis present in the proximal arm. "  Light touch sensation is intact in the axillary, muscular cutaneous, radial, ulnar, and median distributions.  Intact deltoid, biceps, triceps, wrist extensors/flexors, finger extensors/flexors, interossei.  2+ radial pulse wrist    Imaging:  I have independently reviewed the following imaging studies performed at [facility]:  I reviewed plain x-rays of the right shoulder as well as a CT scan of the right shoulder performed at [facility].  There is at least a 2 part displaced proximal humerus fracture through the surgical neck.  There is also fracture lines that extended to the tuberosities.  For the most part, the head is intact.    Assessment:  1. Closed fracture of proximal end of right humerus, unspecified fracture morphology, initial encounter    2. Pre-op testing        Plan:  I reviewed the findings in detail with the patient.  I reviewed the x-rays that show that she has quite a bit of displacement of the surgical neck.  I have discussed both non operative and operative options.  She would like to go and proceed with open reduction internal fixation of the right proximal humerus.  We will plan to proceed this upcoming week.  I have reviewed the risk, benefits, and the alternatives.    Orders Placed This Encounter    COVID-19 Routine Screening    CBC Auto Differential    Basic Metabolic Panel    Diet NPO    Cleanse with Chlorhexidine (CHG)    Place sequential compression device    Place ELA hose    Full code    SCHEDULED EKG 12-LEAD (to Muse)    oxyCODONE-acetaminophen (PERCOCET) 5-325 mg per tablet    IP VTE HIGH RISK PATIENT    Case Request Operating Room: ORIF, FRACTURE, HUMERUS, PROXIMAL     No follow-ups on file.

## 2022-04-28 ENCOUNTER — ANESTHESIA (OUTPATIENT)
Dept: SURGERY | Facility: HOSPITAL | Age: 62
End: 2022-04-28
Payer: COMMERCIAL

## 2022-04-28 ENCOUNTER — HOSPITAL ENCOUNTER (OUTPATIENT)
Facility: HOSPITAL | Age: 62
Discharge: HOME | End: 2022-04-28
Attending: ORTHOPAEDIC SURGERY | Admitting: ORTHOPAEDIC SURGERY
Payer: COMMERCIAL

## 2022-04-28 ENCOUNTER — ANESTHESIA EVENT (OUTPATIENT)
Dept: SURGERY | Facility: HOSPITAL | Age: 62
End: 2022-04-28
Payer: COMMERCIAL

## 2022-04-28 VITALS
DIASTOLIC BLOOD PRESSURE: 59 MMHG | RESPIRATION RATE: 20 BRPM | OXYGEN SATURATION: 95 % | TEMPERATURE: 98 F | WEIGHT: 185 LBS | HEIGHT: 60 IN | SYSTOLIC BLOOD PRESSURE: 122 MMHG | HEART RATE: 81 BPM | BODY MASS INDEX: 36.32 KG/M2

## 2022-04-28 DIAGNOSIS — S42.201A CLOSED FRACTURE OF PROXIMAL END OF RIGHT HUMERUS, UNSPECIFIED FRACTURE MORPHOLOGY, INITIAL ENCOUNTER: ICD-10-CM

## 2022-04-28 LAB — POCT GLUCOSE: 127 MG/DL (ref 70–110)

## 2022-04-28 PROCEDURE — 36000711: Performed by: ORTHOPAEDIC SURGERY

## 2022-04-28 PROCEDURE — C1769 GUIDE WIRE: HCPCS | Performed by: ORTHOPAEDIC SURGERY

## 2022-04-28 PROCEDURE — 63600175 PHARM REV CODE 636 W HCPCS: Performed by: ORTHOPAEDIC SURGERY

## 2022-04-28 PROCEDURE — 37000008 HC ANESTHESIA 1ST 15 MINUTES: Performed by: ORTHOPAEDIC SURGERY

## 2022-04-28 PROCEDURE — 64415 NJX AA&/STRD BRCH PLXS IMG: CPT | Performed by: STUDENT IN AN ORGANIZED HEALTH CARE EDUCATION/TRAINING PROGRAM

## 2022-04-28 PROCEDURE — 36000710: Performed by: ORTHOPAEDIC SURGERY

## 2022-04-28 PROCEDURE — 71000033 HC RECOVERY, INTIAL HOUR: Performed by: ORTHOPAEDIC SURGERY

## 2022-04-28 PROCEDURE — 23615 OPTX PROX HUMRL FX W/INT FIX: CPT | Mod: RT,,, | Performed by: ORTHOPAEDIC SURGERY

## 2022-04-28 PROCEDURE — C1713 ANCHOR/SCREW BN/BN,TIS/BN: HCPCS | Performed by: ORTHOPAEDIC SURGERY

## 2022-04-28 PROCEDURE — 37000009 HC ANESTHESIA EA ADD 15 MINS: Performed by: ORTHOPAEDIC SURGERY

## 2022-04-28 PROCEDURE — 25000003 PHARM REV CODE 250: Performed by: NURSE ANESTHETIST, CERTIFIED REGISTERED

## 2022-04-28 PROCEDURE — 23430 REPAIR BICEPS TENDON: CPT | Mod: 51,RT,, | Performed by: ORTHOPAEDIC SURGERY

## 2022-04-28 PROCEDURE — 63600175 PHARM REV CODE 636 W HCPCS: Performed by: NURSE ANESTHETIST, CERTIFIED REGISTERED

## 2022-04-28 PROCEDURE — 63600175 PHARM REV CODE 636 W HCPCS: Performed by: STUDENT IN AN ORGANIZED HEALTH CARE EDUCATION/TRAINING PROGRAM

## 2022-04-28 PROCEDURE — 27201423 OPTIME MED/SURG SUP & DEVICES STERILE SUPPLY: Performed by: ORTHOPAEDIC SURGERY

## 2022-04-28 PROCEDURE — 23615 PR OPEN TREATMENT PROX HUMERAL FRACTURE: ICD-10-PCS | Mod: RT,,, | Performed by: ORTHOPAEDIC SURGERY

## 2022-04-28 PROCEDURE — 71000015 HC POSTOP RECOV 1ST HR: Performed by: ORTHOPAEDIC SURGERY

## 2022-04-28 PROCEDURE — 23430 PR REPAIR BICEPS LONG TENDON: ICD-10-PCS | Mod: 51,RT,, | Performed by: ORTHOPAEDIC SURGERY

## 2022-04-28 DEVICE — SCREW LOCKING 3.5MM X 28MM: Type: IMPLANTABLE DEVICE | Site: HUMERUS | Status: FUNCTIONAL

## 2022-04-28 DEVICE — PLATE PROXIMAL HUMERAL 3 HOLE: Type: IMPLANTABLE DEVICE | Site: HUMERUS | Status: FUNCTIONAL

## 2022-04-28 DEVICE — SCREW LOCKING 3.5MM X 24MM: Type: IMPLANTABLE DEVICE | Site: HUMERUS | Status: FUNCTIONAL

## 2022-04-28 DEVICE — SCREW LOCKING 3.5MM/32MM RECES: Type: IMPLANTABLE DEVICE | Site: HUMERUS | Status: FUNCTIONAL

## 2022-04-28 DEVICE — SCREW LOCK 3.5MM X 26MM: Type: IMPLANTABLE DEVICE | Site: HUMERUS | Status: FUNCTIONAL

## 2022-04-28 DEVICE — SCREW LOCK 3.5MM X 30MM: Type: IMPLANTABLE DEVICE | Site: HUMERUS | Status: FUNCTIONAL

## 2022-04-28 DEVICE — SCREW CORTEX 3.5 X 26: Type: IMPLANTABLE DEVICE | Site: HUMERUS | Status: FUNCTIONAL

## 2022-04-28 DEVICE — SCREW CORTEX 3.5 X 24: Type: IMPLANTABLE DEVICE | Site: HUMERUS | Status: FUNCTIONAL

## 2022-04-28 DEVICE — WIRE-K 20MM X 150MM.: Type: IMPLANTABLE DEVICE | Site: HUMERUS | Status: FUNCTIONAL

## 2022-04-28 DEVICE — SCREW LOCKING 3.5MM/18MM: Type: IMPLANTABLE DEVICE | Site: HUMERUS | Status: FUNCTIONAL

## 2022-04-28 RX ORDER — DEXAMETHASONE SODIUM PHOSPHATE 4 MG/ML
INJECTION, SOLUTION INTRA-ARTICULAR; INTRALESIONAL; INTRAMUSCULAR; INTRAVENOUS; SOFT TISSUE
Status: DISCONTINUED | OUTPATIENT
Start: 2022-04-28 | End: 2022-04-28

## 2022-04-28 RX ORDER — FENTANYL CITRATE 50 UG/ML
INJECTION, SOLUTION INTRAMUSCULAR; INTRAVENOUS
Status: DISCONTINUED | OUTPATIENT
Start: 2022-04-28 | End: 2022-04-28

## 2022-04-28 RX ORDER — PROPOFOL 10 MG/ML
VIAL (ML) INTRAVENOUS
Status: DISCONTINUED | OUTPATIENT
Start: 2022-04-28 | End: 2022-04-28

## 2022-04-28 RX ORDER — SODIUM CHLORIDE 9 MG/ML
INJECTION, SOLUTION INTRAVENOUS CONTINUOUS
Status: DISCONTINUED | OUTPATIENT
Start: 2022-04-28 | End: 2022-04-28 | Stop reason: HOSPADM

## 2022-04-28 RX ORDER — CEPHALEXIN 500 MG/1
500 CAPSULE ORAL EVERY 12 HOURS
Qty: 2 CAPSULE | Refills: 0 | Status: SHIPPED | OUTPATIENT
Start: 2022-04-28 | End: 2022-04-29

## 2022-04-28 RX ORDER — ROPIVACAINE HYDROCHLORIDE 5 MG/ML
INJECTION, SOLUTION EPIDURAL; INFILTRATION; PERINEURAL
Status: DISCONTINUED | OUTPATIENT
Start: 2022-04-28 | End: 2022-04-28

## 2022-04-28 RX ORDER — ONDANSETRON 2 MG/ML
INJECTION INTRAMUSCULAR; INTRAVENOUS
Status: DISCONTINUED | OUTPATIENT
Start: 2022-04-28 | End: 2022-04-28

## 2022-04-28 RX ORDER — ONDANSETRON HYDROCHLORIDE 8 MG/1
8 TABLET, FILM COATED ORAL EVERY 12 HOURS PRN
Qty: 24 TABLET | Refills: 0 | Status: SHIPPED | OUTPATIENT
Start: 2022-04-28 | End: 2022-05-26

## 2022-04-28 RX ORDER — ONDANSETRON 2 MG/ML
4 INJECTION INTRAMUSCULAR; INTRAVENOUS ONCE AS NEEDED
Status: DISCONTINUED | OUTPATIENT
Start: 2022-04-28 | End: 2022-04-28 | Stop reason: HOSPADM

## 2022-04-28 RX ORDER — MIDAZOLAM HYDROCHLORIDE 1 MG/ML
INJECTION, SOLUTION INTRAMUSCULAR; INTRAVENOUS
Status: DISCONTINUED | OUTPATIENT
Start: 2022-04-28 | End: 2022-04-28

## 2022-04-28 RX ORDER — SODIUM CHLORIDE 0.9 % (FLUSH) 0.9 %
10 SYRINGE (ML) INJECTION
Status: DISCONTINUED | OUTPATIENT
Start: 2022-04-28 | End: 2022-04-28 | Stop reason: HOSPADM

## 2022-04-28 RX ORDER — LIDOCAINE HYDROCHLORIDE 20 MG/ML
INJECTION, SOLUTION EPIDURAL; INFILTRATION; INTRACAUDAL; PERINEURAL
Status: DISCONTINUED | OUTPATIENT
Start: 2022-04-28 | End: 2022-04-28

## 2022-04-28 RX ORDER — CEFAZOLIN SODIUM 2 G/50ML
2 SOLUTION INTRAVENOUS
Status: COMPLETED | OUTPATIENT
Start: 2022-04-28 | End: 2022-04-28

## 2022-04-28 RX ORDER — HYDROMORPHONE HYDROCHLORIDE 2 MG/ML
0.5 INJECTION, SOLUTION INTRAMUSCULAR; INTRAVENOUS; SUBCUTANEOUS EVERY 5 MIN PRN
Status: DISCONTINUED | OUTPATIENT
Start: 2022-04-28 | End: 2022-04-28 | Stop reason: HOSPADM

## 2022-04-28 RX ORDER — PHENYLEPHRINE HYDROCHLORIDE 10 MG/ML
INJECTION INTRAVENOUS
Status: DISCONTINUED | OUTPATIENT
Start: 2022-04-28 | End: 2022-04-28

## 2022-04-28 RX ORDER — OXYCODONE AND ACETAMINOPHEN 10; 325 MG/1; MG/1
1 TABLET ORAL EVERY 6 HOURS PRN
Qty: 24 TABLET | Refills: 0 | Status: SHIPPED | OUTPATIENT
Start: 2022-04-28 | End: 2022-05-05

## 2022-04-28 RX ORDER — VASOPRESSIN 20 [USP'U]/ML
INJECTION, SOLUTION INTRAMUSCULAR; SUBCUTANEOUS
Status: DISCONTINUED | OUTPATIENT
Start: 2022-04-28 | End: 2022-04-28

## 2022-04-28 RX ORDER — SUCCINYLCHOLINE CHLORIDE 20 MG/ML
INJECTION INTRAMUSCULAR; INTRAVENOUS
Status: DISCONTINUED | OUTPATIENT
Start: 2022-04-28 | End: 2022-04-28

## 2022-04-28 RX ORDER — NEOSTIGMINE METHYLSULFATE 1 MG/ML
INJECTION, SOLUTION INTRAVENOUS
Status: DISCONTINUED | OUTPATIENT
Start: 2022-04-28 | End: 2022-04-28

## 2022-04-28 RX ORDER — ROCURONIUM BROMIDE 10 MG/ML
INJECTION, SOLUTION INTRAVENOUS
Status: DISCONTINUED | OUTPATIENT
Start: 2022-04-28 | End: 2022-04-28

## 2022-04-28 RX ORDER — MUPIROCIN 20 MG/G
OINTMENT TOPICAL
Status: DISCONTINUED | OUTPATIENT
Start: 2022-04-28 | End: 2022-04-28 | Stop reason: HOSPADM

## 2022-04-28 RX ADMIN — PHENYLEPHRINE HYDROCHLORIDE 300 MCG: 10 INJECTION INTRAVENOUS at 01:04

## 2022-04-28 RX ADMIN — VASOPRESSIN 1 UNITS: 20 INJECTION, SOLUTION INTRAMUSCULAR; SUBCUTANEOUS at 02:04

## 2022-04-28 RX ADMIN — CEFAZOLIN SODIUM 2 G: 2 SOLUTION INTRAVENOUS at 01:04

## 2022-04-28 RX ADMIN — VASOPRESSIN 1 UNITS: 20 INJECTION, SOLUTION INTRAMUSCULAR; SUBCUTANEOUS at 03:04

## 2022-04-28 RX ADMIN — ONDANSETRON 8 MG: 2 INJECTION, SOLUTION INTRAMUSCULAR; INTRAVENOUS at 04:04

## 2022-04-28 RX ADMIN — PROPOFOL 50 MG: 10 INJECTION, EMULSION INTRAVENOUS at 01:04

## 2022-04-28 RX ADMIN — SODIUM CHLORIDE, SODIUM LACTATE, POTASSIUM CHLORIDE, AND CALCIUM CHLORIDE: .6; .31; .03; .02 INJECTION, SOLUTION INTRAVENOUS at 01:04

## 2022-04-28 RX ADMIN — ROCURONIUM BROMIDE 5 MG: 10 INJECTION, SOLUTION INTRAVENOUS at 01:04

## 2022-04-28 RX ADMIN — LIDOCAINE HYDROCHLORIDE 75 MG: 20 INJECTION, SOLUTION EPIDURAL; INFILTRATION; INTRACAUDAL; PERINEURAL at 01:04

## 2022-04-28 RX ADMIN — DEXAMETHASONE SODIUM PHOSPHATE 4 MG: 4 INJECTION, SOLUTION INTRA-ARTICULAR; INTRALESIONAL; INTRAMUSCULAR; INTRAVENOUS; SOFT TISSUE at 01:04

## 2022-04-28 RX ADMIN — SUCCINYLCHOLINE CHLORIDE 120 MG: 20 INJECTION, SOLUTION INTRAMUSCULAR; INTRAVENOUS at 01:04

## 2022-04-28 RX ADMIN — PROPOFOL 100 MG: 10 INJECTION, EMULSION INTRAVENOUS at 01:04

## 2022-04-28 RX ADMIN — PHENYLEPHRINE HYDROCHLORIDE 0.2 MCG/KG/MIN: 10 INJECTION INTRAVENOUS at 01:04

## 2022-04-28 RX ADMIN — GLYCOPYRROLATE 0.4 MG: 0.2 INJECTION, SOLUTION INTRAMUSCULAR; INTRAVITREAL at 04:04

## 2022-04-28 RX ADMIN — FENTANYL CITRATE 100 MCG: 50 INJECTION, SOLUTION INTRAMUSCULAR; INTRAVENOUS at 01:04

## 2022-04-28 RX ADMIN — ROPIVACAINE HYDROCHLORIDE 20 ML: 5 INJECTION, SOLUTION EPIDURAL; INFILTRATION; PERINEURAL at 01:04

## 2022-04-28 RX ADMIN — MIDAZOLAM 2 MG: 1 INJECTION INTRAMUSCULAR; INTRAVENOUS at 01:04

## 2022-04-28 RX ADMIN — PHENYLEPHRINE HYDROCHLORIDE 100 MCG: 10 INJECTION INTRAVENOUS at 02:04

## 2022-04-28 RX ADMIN — ROCURONIUM BROMIDE 15 MG: 10 INJECTION, SOLUTION INTRAVENOUS at 01:04

## 2022-04-28 RX ADMIN — NEOSTIGMINE METHYLSULFATE 3 MG: 1 INJECTION INTRAVENOUS at 04:04

## 2022-04-28 RX ADMIN — GLYCOPYRROLATE 0.2 MG: 0.2 INJECTION, SOLUTION INTRAMUSCULAR; INTRAVITREAL at 01:04

## 2022-04-28 NOTE — H&P
I have read and updated the H&P on file. There are not changes to be made to the documentation on file.     Here today for ORIF RIGHT proximal humerus   - - -     Dawson Bryant MD   [facility], PGY-5    Patient ID:   Etta Irwin is a 62 y.o. female.     Chief Complaint:   Right proximal humerus fracture     HPI:   The patient is a 62-year-old female who is presenting today after sustaining a fracture of the right proximal humerus last Friday.  She is a right-hand-dominant female.  She denies any prior issues with the right shoulder.  Her pain level today is 7/10.  She is currently taking Percocet.  She did present to the emergency room where x-rays did reveal the fracture.  She is currently in a sling.  She denies numbness or paresthesias in her right upper extremity.     Medications:     Current Outpatient Medications:     alendronate (FOSAMAX) 70 MG tablet, Take 1 tablet by mouth once daily., Disp: , Rfl:     amitriptyline (ELAVIL) 50 MG tablet, Take 1 tablet by mouth every evening. , Disp: , Rfl:     amLODIPine (NORVASC) 5 MG tablet, Take 5 mg by mouth once daily., Disp: , Rfl:     atorvastatin (LIPITOR) 40 MG tablet, Take 1 tablet by mouth every evening. , Disp: , Rfl:     escitalopram oxalate (LEXAPRO) 20 MG tablet, Take 1 tablet by mouth every evening. , Disp: , Rfl:     fish oil-omega-3 fatty acids 300-1,000 mg capsule, Take 2 g by mouth once daily., Disp: , Rfl:     levothyroxine (SYNTHROID) 50 MCG tablet, TK 1 T PO QD, Disp: , Rfl: 0    lisinopril (PRINIVIL,ZESTRIL) 20 MG tablet, Take 5 mg by mouth once daily. , Disp: , Rfl:     metFORMIN (GLUCOPHAGE) 1000 MG tablet, TK 1 T PO  BID, Disp: , Rfl: 0    multivitamin capsule, Take 1 capsule by mouth once daily., Disp: , Rfl:     pioglitazone (ACTOS) 30 MG tablet, Take 1 tablet by mouth once daily., Disp: , Rfl:     vitamin D (VITAMIN D3) 1000 units Tab, Take 1,000 Units by mouth once daily., Disp: , Rfl:     vitamin E 400 UNIT capsule,  "Take 400 Units by mouth once daily., Disp: , Rfl:     aspirin 81 MG Chew, Take 1 tablet (81 mg total) by mouth once daily., Disp: , Rfl: 0    oxyCODONE-acetaminophen (PERCOCET) 5-325 mg per tablet, Take 1 tablet by mouth every 6 (six) hours as needed for Pain (severe pain)., Disp: 12 tablet, Rfl: 0     Allergies:  Review of patient's allergies indicates:  No Known Allergies     Past Medical History:       Past Medical History:   Diagnosis Date    Depression      Diabetes mellitus      Hyperlipidemia      Hypertension      Osteopenia           Past Surgical History:        Past Surgical History:   Procedure Laterality Date     SECTION        CHOLECYSTECTOMY         Laparoscopic    KNEE ARTHROPLASTY Left 2021     Procedure: ARTHROPLASTY, KNEE;  Surgeon: Jovi Valdovinos MD;  Location: [facility];  Service: Orthopedics;  Laterality: Left;  Manuelitouy notified 21 HILARIA bravo/ Jomar confirmed 21 MN         Social History:  Social History            Occupational History    Not on file   Tobacco Use    Smoking status: Never Smoker    Smokeless tobacco: Never Used   Substance and Sexual Activity    Alcohol use: No    Drug use: No    Sexual activity: Yes       Partners: Male         Family History:        Family History   Problem Relation Age of Onset    Uterine cancer Mother           ROS:  Review of Systems   Musculoskeletal: Positive for falls, joint pain, muscle weakness and myalgias.   Neurological: Negative for numbness and paresthesias.   All other systems reviewed and are negative.        Vitals:  BP (!) 84/59 (BP Location: Left arm, Patient Position: Sitting, BP Method: Large (Automatic))   Pulse 94   Ht 5' 6" (1.676 m)   Wt 88 kg (194 lb 1.8 oz)   LMP  (LMP Unknown)   BMI 31.33 kg/m²      Physical Examination:  Comprehensive Orthopaedic Musculoskeletal Exam     General        Constitutional: appears stated age, mildly obese, well-developed and well-nourished    Scleral icterus: no    Labored " breathing: no    Psychiatric: normal mood and affect and no acute distress    Neurological: alert and oriented x3    Skin: intact    Lymphadenopathy: none     Ortho Exam   Right shoulder exam:  There is ecchymosis present in the proximal arm.  Light touch sensation is intact in the axillary, muscular cutaneous, radial, ulnar, and median distributions.  Intact deltoid, biceps, triceps, wrist extensors/flexors, finger extensors/flexors, interossei.  2+ radial pulse wrist     Imaging:  I have independently reviewed the following imaging studies performed at [facility]:  I reviewed plain x-rays of the right shoulder as well as a CT scan of the right shoulder performed at [facility].  There is at least a 2 part displaced proximal humerus fracture through the surgical neck.  There is also fracture lines that extended to the tuberosities.  For the most part, the head is intact.     Assessment:  1. Closed fracture of proximal end of right humerus, unspecified fracture morphology, initial encounter    2. Pre-op testing          Plan:  I reviewed the findings in detail with the patient.  I reviewed the x-rays that show that she has quite a bit of displacement of the surgical neck.  I have discussed both non operative and operative options.  She would like to go and proceed with open reduction internal fixation of the right proximal humerus.  We will plan to proceed this upcoming week.  I have reviewed the risk, benefits, and the alternatives.         Orders Placed This Encounter    COVID-19 Routine Screening    CBC Auto Differential    Basic Metabolic Panel    Diet NPO    Cleanse with Chlorhexidine (CHG)    Place sequential compression device    Place ELA hose    Full code    SCHEDULED EKG 12-LEAD (to Muse)    oxyCODONE-acetaminophen (PERCOCET) 5-325 mg per tablet    IP VTE HIGH RISK PATIENT    Case Request Operating Room: ORIF, FRACTURE, HUMERUS, PROXIMAL      I have reviewed the H&P. There are no interval changes to report.

## 2022-04-28 NOTE — ANESTHESIA PROCEDURE NOTES
Peripheral Block    Patient location during procedure: pre-op   Block not for primary anesthetic.  Reason for block: at surgeon's request and post-op pain management   Post-op Pain Location: Right humerus surgery   Start time: 4/28/2022 1:02 PM  Timeout: 4/28/2022 12:55 PM   End time: 4/28/2022 1:04 PM    Staffing  Authorizing Provider: Bi Gregory MD  Performing Provider: Bi Gregory MD    Preanesthetic Checklist  Completed: patient identified, IV checked, site marked, risks and benefits discussed, surgical consent, monitors and equipment checked, pre-op evaluation and timeout performed  Peripheral Block  Patient position: sitting  Prep: ChloraPrep  Patient monitoring: cardiac monitor, continuous pulse ox, heart rate and frequent blood pressure checks  Block type: interscalene  Laterality: right  Injection technique: single shot  Needle  Needle type: Stimuplex   Needle gauge: 21 G  Needle length: 2 in  Needle localization: anatomical landmarks and ultrasound guidance   -ultrasound image captured on disc.  Assessment  Injection assessment: negative aspiration, negative parasthesia and local visualized surrounding nerve  Paresthesia pain: none  Heart rate change: no  Slow fractionated injection: yes  Pain Tolerance: comfortable throughout block and no complaints

## 2022-04-28 NOTE — TRANSFER OF CARE
Anesthesia Transfer of Care Note    Patient: Etta Irwin    Procedure(s) Performed: Procedure(s) (LRB):  ORIF, FRACTURE, HUMERUS, PROXIMAL (Right)    Patient location: PACU    Anesthesia Type: general    Transport from OR: Transported from OR on 6-10 L/min O2 by face mask with adequate spontaneous ventilation    Post pain: adequate analgesia    Post assessment: no apparent anesthetic complications    Post vital signs: stable    Level of consciousness: awake and alert    Nausea/Vomiting: no nausea/vomiting    Complications: none    Transfer of care protocol was followed      Last vitals:   Visit Vitals  BP (!) 141/66   Pulse 80   Temp 36.6 °C (97.9 °F) (Temporal)   Resp 16   Ht 5' (1.524 m)   Wt 83.9 kg (185 lb)   LMP  (LMP Unknown)   SpO2 97%   Breastfeeding No   BMI 36.13 kg/m²

## 2022-04-28 NOTE — DISCHARGE SUMMARY
[facility] (Hospital)  Brief Operative Note    Surgery Date: 4/28/2022     Surgeon(s) and Role:     * Dwain Galaviz MD - Primary    Assisting Surgeon:   Dawson Bryant MD Chief Resident  Charles Centeno MD resident    Pre-op Diagnosis:  Closed fracture of proximal end of right humerus, unspecified fracture morphology, initial encounter [S42.201A]    Post-op Diagnosis:  Post-Op Diagnosis Codes:     * Closed fracture of proximal end of right humerus, unspecified fracture morphology, initial encounter [S42.201A]    Procedures:  1. ORIF Right Proximal Humerus     Anesthesia: General    Operative Findings: See full op note for further details     Estimated Blood Loss: See full op note for further details          Specimens:   Specimen (24h ago, onward)                None          Discharge Note    OUTCOME: Patient tolerated treatment/procedure well without complication and is now ready for discharge.    DISPOSITION: Home or Self Care    FINAL DIAGNOSIS:  Right Proximal Humerus fracture     FOLLOWUP: In clinic in 2 weeks     DISCHARGE INSTRUCTIONS:    Discharge Procedure Orders   Diet Adult Regular     Notify your health care provider if you experience any of the following:  temperature >100.4     Notify your health care provider if you experience any of the following:  persistent nausea and vomiting or diarrhea     Notify your health care provider if you experience any of the following:  difficulty breathing or increased cough     Notify your health care provider if you experience any of the following:  redness, tenderness, or signs of infection (pain, swelling, redness, odor or green/yellow discharge around incision site)     Leave dressing on - Keep it clean, dry, and intact until clinic visit     Weight bearing restrictions (specify):   Order Comments: NWJH Centeno MD  [facility] PGY3    I have reviewed the notes, assessments, and/or procedures performed by Dr. Centeno, I concur with  her/his documentation of Etta Irwin.

## 2022-04-28 NOTE — ANESTHESIA PREPROCEDURE EVALUATION
2022  Etta Irwin is a 62 y.o., female scheduled for RUE ORIF.     Past Medical History:   Diagnosis Date    Depression     Diabetes mellitus     Hyperlipidemia     Hypertension     Osteopenia      Past Surgical History:   Procedure Laterality Date     SECTION      CHOLECYSTECTOMY      Laparoscopic    KNEE ARTHROPLASTY Left 2021    Procedure: ARTHROPLASTY, KNEE;  Surgeon: Jovi Valdovinos MD;  Location: [facility];  Service: Orthopedics;  Laterality: Left;  Depuy notified 21 CC  Elías bravo/ Depuy confirmed 21 MN     Pre-op Assessment    I have reviewed the Patient Summary Reports.    I have reviewed the Nursing Notes. I have reviewed the NPO Status.   I have reviewed the Medications.     Review of Systems  Anesthesia Hx:  No problems with previous Anesthesia  Denies Family Hx of Anesthesia complications.    Social:  Non-Smoker, No Alcohol Use    Hematology/Oncology:  Hematology Normal        Cardiovascular:   Hypertension, well controlled  Denies Angina. hyperlipidemia     ECG has been reviewed.    Pulmonary:  Pulmonary Normal    Renal/:  Renal/ Normal     Hepatic/GI:  Hepatic/GI Normal    Neurological:  Neurology Normal    Endocrine:   Diabetes, well controlled, type 2    Psych:   depression          Physical Exam  General:  Obesity, Well nourished, Cooperative, Alert and Oriented      Airway/Jaw/Neck:  Airway Findings: Mouth Opening: Normal   Tongue: Normal   General Airway Assessment: Adult Mallampati: III  TM Distance: Normal, at least 6 cm   Neck ROM: Normal ROM       Dental:  Dental Findings: In tact     Chest/Lungs:  Chest/Lungs Findings: Normal Respiratory Rate      Heart/Vascular:  Heart Findings: Rate: Normal     Abdomen:  Abdomen Findings: (obese)       Mental Status:  Mental Status Findings:  Cooperative, Alert and Oriented         Anesthesia Plan  Type  of Anesthesia, risks & benefits discussed:  Anesthesia Type:  general, MAC, regional, Spinal    Patient's Preference:   Plan Factors:          Intra-op Monitoring Plan: Standard ASA Monitors  Intra-op Monitoring Plan Comments:   Post Op Pain Control Plan: multimodal analgesia, peripheral nerve block, per primary service following discharge from PACU and IV/PO Opioids PRN  Post Op Pain Control Plan Comments:     Induction:   IV  Beta Blocker:  Patient is not currently on a Beta-Blocker (No further documentation required).       Informed Consent: Informed consent signed with the Patient and all parties understand the risks and agree with anesthesia plan.  All questions answered.  Anesthesia consent signed with patient.  ASA Score: 2     Day of Surgery Review of History & Physical:  There are no significant changes.            Ready For Surgery From Anesthesia Perspective.           Physical Exam  General: Obesity, Well nourished, Cooperative, Alert and Oriented    Airway:  Mallampati: III   Mouth Opening: Normal  TM Distance: Normal, at least 6 cm  Tongue: Normal  Neck ROM: Normal ROM    Dental:  In tact    Chest/Lungs:  Normal Respiratory Rate    Heart:  Rate: Normal          Anesthesia Plan  Type of Anesthesia, risks & benefits discussed:    Anesthesia Type: general, MAC, regional, Spinal  Intra-op Monitoring Plan: Standard ASA Monitors  Post Op Pain Control Plan: multimodal analgesia, peripheral nerve block, per primary service following discharge from PACU and IV/PO Opioids PRN  Induction:  IV  Airway Plan: Direct and Video, Post-Induction  Informed Consent: Informed consent signed with the Patient and all parties understand the risks and agree with anesthesia plan.  All questions answered.   ASA Score: 2    Ready For Surgery From Anesthesia Perspective.       .

## 2022-04-28 NOTE — ANESTHESIA PROCEDURE NOTES
Intubation    Date/Time: 4/28/2022 1:22 PM  Performed by: Shilpa Alfonso CRNA  Authorized by: Stephany Watson MD     Intubation:     Induction:  Intravenous    Intubated:  Postinduction    Mask Ventilation:  Easy mask    Attempts:  1    Attempted By:  CRNA    Method of Intubation:  Video laryngoscopy    Blade:  Canchola 3    Laryngeal View Grade: Grade I - full view of cords      Difficult Airway Encountered?: No      Complications:  None    Airway Device:  Oral endotracheal tube    Airway Device Size:  7.5    Style/Cuff Inflation:  Cuffed    Inflation Amount (mL):  6    Tube secured:  21    Secured at:  The lips    Placement Verified By:  Capnometry    Complicating Factors:  None    Findings Post-Intubation:  BS equal bilateral

## 2022-04-28 NOTE — OP NOTE
Orthopedic Surgery Operative Note    Attending:  Dwain Galaviz MD     First Assistant:  Dawson Bryant MD     Second Assistant:  Charles Centeno MD    Date of Procedure:  4/28/22    Pre-op Diagnosis:   Right proximal humerus fracture     Post-op Diagnosis:  Same     Procedure:  ORIF Right proximal humerus  Open right biceps tenodesis    Implant:  Synthes 3.5 mm proximal humerus locking plate     Anesthesia:  GETA + Interscalene    Estimated Blood Loss:  100 mL      Indications:  62 y.o.female with history of diabetes mellitus with a mechanical fall from standing onto her right shoulder on 04/22.  The patient presented emergency department x-rays were performed demonstrating a comminuted 4 part proximal humerus fracture without head split.  The patient was placed in a sling.  The patient presented to clinic.  We discussed operative versus non operative management for her fracture morphology.  Given the amount of dissociation from the humeral head to the shaft we recommended operative treatment.  The patient understood both treatment algorithm is and elected to proceed with surgery.  We discussed risks benefits alternatives of operative intervention.  The patient understood these risks.  The patient ultimately was consented for open reduction internal fixation right proximal humerus fracture.    Procedure in Detail:  Surgical extremity was marked.  Consents were verified.  The patient was taken from preop holding to the operative suite.  The patient was safely transferred to a regular bed with a beach chair extension.  Preoperative antibiotics were administered 1 hour prior to surgical intervention.  The patient underwent general endotracheal intubation without complications.  The head was secured within the beach chair positioner.  Cervical spine alignment was verified to be neutral.  The right upper extremity was prepped and draped in standard sterile fashion.  Time-out was performed to ensure the correct patient,  surgeon, laterality, procedure, implant availability, and preoperative antibiotic dosing.    A standard deltopectoral approach was utilized.  Dissection was carried down sharply through subcutaneous tissues.  The deltopectoral fascia was identified.  Full-thickness flaps were elevated.  Adhesions under the deltoid were bluntly removed with finger dissection.  The coracoid and conjoined tendon was identified.  The cephalic vein was visualized and mobilized laterally with electrocautery.  Biceps tendon was identified and this was dissected proximally opening up the rotator interval to the supraglenoid tubercle at the superior aspect of the labrum.  The upper cm of the pectoralis major was released for better visualization and plate positioning.  A soft tissue tenodesis of the biceps performed at the upper border of the pectoralis major tendon.  Multiple 0 Vicryl sutures were used in the supraspinatus, infraspinatus, and subscapularis tendons for control of the head and proximal fragments.  Next, fracture site was visualized.  The lesser tuberosity was a free fracture fragment.  The greater tuberosity was a free fracture fragment.  The shaft was visualized to be impacted medially and significantly displaced with apex anterior deformity.  Fracture hematoma was debrided to allow better visualization of fracture fragment.  Combination axial traction and a bone hook the shaft was disimpacted the humeral head.  Using a parachute technique, the shaft was lateralized and then impacted into the humeral head.  A percutaneous Steinmann pin was placed lateral to medial adjacent to the calcar for provisional fixation out of the eventual plate position.  Interval radiographs demonstrated acceptable fracture reduction with restoration of the medial calcar, no Veress deformity, a centered diaphysis within the humeral head segment.  The greater tuberosity was mobilized distally with tension on the Vicryl sutures and was visualized to  be below the level of the humeral head.    A Synthes 3 hole proximal humerus locking plate was selected.  A locking guide was placed on top of the plate and screwed securely.  This was placed just lateral to the bicipital groove.  Using the guidewire, the plate was placed in the appropriate position with respect to the superior aspect of the humeral head.  The distal aspect of the plate was centered on the diaphysis.  A cortical screw was placed in the oblong hole centered.  This was tightened to bring humeral diaphysis the plate.  Interval radiographs demonstrated appropriate plate position with acceptable trajectory of the calcar screw.  The plate was centered on the lateral radiograph on the humeral diaphysis.  Fracture reduction was again verified to be acceptable with impaction of the diaphysis the humeral head and no varus deformity.  There is restoration of the medial calcar.  With the assistance of fluoroscopy the lateral cortex of the proximal plate was drilled.  Using the locking tower guide overlying the plate, appropriate length locking screws were verified under fluoroscopy and inserted into the plate sequentially.  This was performed to fill all proximal locking options of the plate.  Additional 2 cortical screws were placed in the distal holes of the plate.    All screws were final tightened.  Final radiographs were performed demonstrating no intra-articular penetration of screws.  Appropriate screw lengths.  Excellent reduction with restoration of the medial calcar and no vaurs angulation of the neck shaft angle.  The wounds copiously irrigated normal saline.  Several  #1. PDS suture were placed through the supraspinatus, subscapularis, infraspinatus respectively and passed through the appropriate suture hole trajectories in the plate.  Was excellent tension and reduction of the tuberosity fragments to the plate.  The arm was taken through range of motion demonstrating no dissociation of the  tuberosity fragments in excellent reduction.  The proximal portion of the biceps tendon was truncated just proximal to the soft tissue tenodesis.  The rotator interval was closed with interrupted Vicryl suture.  The upper border of the pectoralis major was repaired with Vicryl suture.    Hemostasis was verified.  The wound was using with multiple L of normal saline.  The deltoid back interval was reapproximated with Vicryl suture.  A layered closure was performed with 0 Vicryl, 2-0 Vicryl subcutaneous and staples on skin.  The extremity was cleansed and dried.  A soft dressing was applied.  Drapes removed.  The patient was placed in a sling.  All counts were correct at the conclusion of the case.  The patient was extubated without complications and safely transferred back to the stretcher.  The patient returned to PACU in stable condition having suffered no complications.    Complications:  None known     Drains:  None    Pathology/Specimens:  None    Plan:  Sling  No active range of motion of the right upper extremity  Multimodal pain control  Zofran for nausea  Recommend starting vitamin-D supplementation 5000 units daily  Follow-up in Orthopedic surgery Clinic in 2 weeks for wound check and suture removal      Dr. Dwain Galaviz was present for all key portions of the procedure.

## 2022-04-29 ENCOUNTER — TELEPHONE (OUTPATIENT)
Dept: ORTHOPEDICS | Facility: CLINIC | Age: 62
End: 2022-04-29
Payer: COMMERCIAL

## 2022-04-29 NOTE — ANESTHESIA POSTPROCEDURE EVALUATION
Anesthesia Post Evaluation    Patient: Etta Irwin    Procedure(s) Performed: Procedure(s) (LRB):  ORIF, FRACTURE, HUMERUS, PROXIMAL (Right)    Final Anesthesia Type: general      Patient location during evaluation: PACU  Patient participation: Yes- Able to Participate  Level of consciousness: awake and alert  Post-procedure vital signs: reviewed and stable  Pain management: adequate  Airway patency: patent    PONV status at discharge: No PONV  Anesthetic complications: no      Cardiovascular status: blood pressure returned to baseline  Respiratory status: unassisted  Hydration status: euvolemic  Follow-up not needed.          Vitals Value Taken Time   /59 04/28/22 1830   Temp 36.7 °C (98 °F) 04/28/22 1830   Pulse 81 04/28/22 1830   Resp 20 04/28/22 1830   SpO2 95 % 04/28/22 1830         Event Time   Out of Recovery 18:00:00         Pain/Danitza Score: Danitza Score: 10 (4/28/2022  6:36 PM)

## 2022-04-29 NOTE — TELEPHONE ENCOUNTER
I telephoned the patient back.  I spoke to her .  Patient's block wore off she is having a lot of pain.  She has taken 1 Percocet every 6 hours.  The pills lasting about 3 hours.  I have advised going to taking Percocet 2 tablets every 6 hours.  In addition, she may take Aleve 2 tablets every 12 hours.      ----- Message from Hola Thornton MA sent at 4/29/2022 10:25 AM CDT -----  Contact: pt     ----- Message -----  From: Chrissy Nunez  Sent: 4/29/2022  10:13 AM CDT  To: Anastacia Prakash Staff    Type:  Needs Medical Advice    Who Called:  py   Symptoms (please be specific):  pt still  in pain and has a few questions   How long has patient had these symptoms:  after surgery      Would the patient rather a call back or a response via [facility]?  Call   Best Call Back Number:  [phone]  Additional Information:

## 2022-05-04 ENCOUNTER — PATIENT MESSAGE (OUTPATIENT)
Dept: ORTHOPEDICS | Facility: CLINIC | Age: 62
End: 2022-05-04
Payer: COMMERCIAL

## 2022-05-05 RX ORDER — TRAMADOL HYDROCHLORIDE 50 MG/1
50 TABLET ORAL EVERY 6 HOURS PRN
Qty: 28 TABLET | Refills: 0 | Status: SHIPPED | OUTPATIENT
Start: 2022-05-05 | End: 2022-05-19 | Stop reason: SDUPTHER

## 2022-05-13 ENCOUNTER — HOSPITAL ENCOUNTER (OUTPATIENT)
Dept: RADIOLOGY | Facility: HOSPITAL | Age: 62
Discharge: HOME | End: 2022-05-13
Attending: ORTHOPAEDIC SURGERY
Payer: COMMERCIAL

## 2022-05-13 ENCOUNTER — TELEPHONE (OUTPATIENT)
Dept: ENDOSCOPY | Facility: HOSPITAL | Age: 62
End: 2022-05-13
Payer: COMMERCIAL

## 2022-05-13 ENCOUNTER — OFFICE VISIT (OUTPATIENT)
Dept: ORTHOPEDICS | Facility: CLINIC | Age: 62
End: 2022-05-13
Payer: COMMERCIAL

## 2022-05-13 VITALS
BODY MASS INDEX: 37.74 KG/M2 | WEIGHT: 192.25 LBS | HEART RATE: 80 BPM | DIASTOLIC BLOOD PRESSURE: 74 MMHG | HEIGHT: 60 IN | SYSTOLIC BLOOD PRESSURE: 118 MMHG

## 2022-05-13 DIAGNOSIS — M17.12 PRIMARY OSTEOARTHRITIS OF LEFT KNEE: ICD-10-CM

## 2022-05-13 DIAGNOSIS — S42.201D CLOSED FRACTURE OF PROXIMAL END OF RIGHT HUMERUS WITH ROUTINE HEALING, SUBSEQUENT ENCOUNTER: Primary | ICD-10-CM

## 2022-05-13 DIAGNOSIS — Z96.652 STATUS POST TOTAL LEFT KNEE REPLACEMENT: ICD-10-CM

## 2022-05-13 PROCEDURE — 3078F DIAST BP <80 MM HG: CPT | Mod: CPTII,S$GLB,, | Performed by: ORTHOPAEDIC SURGERY

## 2022-05-13 PROCEDURE — 73560 X-RAY EXAM OF KNEE 1 OR 2: CPT | Mod: 59,TC,FY,RT

## 2022-05-13 PROCEDURE — 73562 X-RAY EXAM OF KNEE 3: CPT | Mod: TC,FY,LT

## 2022-05-13 PROCEDURE — 1160F PR REVIEW ALL MEDS BY PRESCRIBER/CLIN PHARMACIST DOCUMENTED: ICD-10-PCS | Mod: CPTII,S$GLB,, | Performed by: ORTHOPAEDIC SURGERY

## 2022-05-13 PROCEDURE — 4010F ACE/ARB THERAPY RXD/TAKEN: CPT | Mod: CPTII,S$GLB,, | Performed by: ORTHOPAEDIC SURGERY

## 2022-05-13 PROCEDURE — 99024 POSTOP FOLLOW-UP VISIT: CPT | Mod: S$GLB,,, | Performed by: ORTHOPAEDIC SURGERY

## 2022-05-13 PROCEDURE — 73560 XR KNEE ORTHO LEFT: ICD-10-PCS | Mod: 26,RT,, | Performed by: STUDENT IN AN ORGANIZED HEALTH CARE EDUCATION/TRAINING PROGRAM

## 2022-05-13 PROCEDURE — 3008F BODY MASS INDEX DOCD: CPT | Mod: CPTII,S$GLB,, | Performed by: ORTHOPAEDIC SURGERY

## 2022-05-13 PROCEDURE — 1159F MED LIST DOCD IN RCRD: CPT | Mod: CPTII,S$GLB,, | Performed by: ORTHOPAEDIC SURGERY

## 2022-05-13 PROCEDURE — 3078F PR MOST RECENT DIASTOLIC BLOOD PRESSURE < 80 MM HG: ICD-10-PCS | Mod: CPTII,S$GLB,, | Performed by: ORTHOPAEDIC SURGERY

## 2022-05-13 PROCEDURE — 99024 PR POST-OP FOLLOW-UP VISIT: ICD-10-PCS | Mod: S$GLB,,, | Performed by: ORTHOPAEDIC SURGERY

## 2022-05-13 PROCEDURE — 1160F RVW MEDS BY RX/DR IN RCRD: CPT | Mod: CPTII,S$GLB,, | Performed by: ORTHOPAEDIC SURGERY

## 2022-05-13 PROCEDURE — 73560 X-RAY EXAM OF KNEE 1 OR 2: CPT | Mod: 26,RT,, | Performed by: STUDENT IN AN ORGANIZED HEALTH CARE EDUCATION/TRAINING PROGRAM

## 2022-05-13 PROCEDURE — 3074F PR MOST RECENT SYSTOLIC BLOOD PRESSURE < 130 MM HG: ICD-10-PCS | Mod: CPTII,S$GLB,, | Performed by: ORTHOPAEDIC SURGERY

## 2022-05-13 PROCEDURE — 4010F PR ACE/ARB THEARPY RXD/TAKEN: ICD-10-PCS | Mod: CPTII,S$GLB,, | Performed by: ORTHOPAEDIC SURGERY

## 2022-05-13 PROCEDURE — 73562 X-RAY EXAM OF KNEE 3: CPT | Mod: 26,LT,, | Performed by: STUDENT IN AN ORGANIZED HEALTH CARE EDUCATION/TRAINING PROGRAM

## 2022-05-13 PROCEDURE — 3008F PR BODY MASS INDEX (BMI) DOCUMENTED: ICD-10-PCS | Mod: CPTII,S$GLB,, | Performed by: ORTHOPAEDIC SURGERY

## 2022-05-13 PROCEDURE — 99999 PR PBB SHADOW E&M-EST. PATIENT-LVL IV: ICD-10-PCS | Mod: PBBFAC,,, | Performed by: ORTHOPAEDIC SURGERY

## 2022-05-13 PROCEDURE — 3074F SYST BP LT 130 MM HG: CPT | Mod: CPTII,S$GLB,, | Performed by: ORTHOPAEDIC SURGERY

## 2022-05-13 PROCEDURE — 99999 PR PBB SHADOW E&M-EST. PATIENT-LVL IV: CPT | Mod: PBBFAC,,, | Performed by: ORTHOPAEDIC SURGERY

## 2022-05-13 PROCEDURE — 73562 XR KNEE ORTHO LEFT: ICD-10-PCS | Mod: 26,LT,, | Performed by: STUDENT IN AN ORGANIZED HEALTH CARE EDUCATION/TRAINING PROGRAM

## 2022-05-13 PROCEDURE — 1159F PR MEDICATION LIST DOCUMENTED IN MEDICAL RECORD: ICD-10-PCS | Mod: CPTII,S$GLB,, | Performed by: ORTHOPAEDIC SURGERY

## 2022-05-13 NOTE — PATIENT INSTRUCTIONS
Shoulder Home Exercise Program Post-Op    It is recommended that you take approximately 15-30 minutes in the morning and 15-30 minutes in the evening to perform these exercises.     Pendulum Exercises: Spend approximately 5 minutes gently rotating the arm in a clockwise and counterclockwise direction.        2.  Supine Passive Elevation: Gently elevate the operative arm with the opposite arm. When the shoulder feels tight, hold in place for about 5-10 seconds and then bring the arm down. Repeat these steps to complete 10 repetitions.        3.  Supine Passive External Rotation: Gently rotate the operative arm with the opposite arm. When the shoulder feels tight, hold in place for about 5-10 seconds and then bring the arm back into a neutral position. Repeat these steps to complete 10 repetitions.

## 2022-05-13 NOTE — TELEPHONE ENCOUNTER
Spoke with patient about arrival time @ 1200.   Covid test = Vaccinated       Prep instructions reviewed: the day before the procedure, follow a clear liquid diet all day, then start the first 1/2 of prep at 5pm and take 2nd 1/2 of prep @0700  Pt must be completely NPO when prep completed @0900              Medications: Do not take Insulin or oral diabetic medications the day of the procedure.  Take as prescribed: heart, seizure and blood pressure medication in the morning with a sip of water (less than an ounce).  Take any breathing medications and bring inhalers to hospital with you Leave all valuables and jewelry at home.     Wear comfortable clothes to procedure to change into hospital gown You cannot drive for 24 hours after your procedure because you will receive sedation for your procedure to make you comfortable.  A ride must be provided at discharge.

## 2022-05-13 NOTE — PROGRESS NOTES
Patient ID:   Etta Irwin is a 62 y.o. female.    Chief Complaint:   Surgical aftercare s/p ORIF right proximal humerus fracture    HPI:   The patient is returning for her first post-op visit.  She reports pain in the anterior aspect of her right shoulder and arm.  She denies any wound healing problems.  She denies any fevers or chills.    Medications:    Current Outpatient Medications:     alendronate (FOSAMAX) 70 MG tablet, Take 1 tablet by mouth once daily., Disp: , Rfl:     amLODIPine (NORVASC) 5 MG tablet, Take 5 mg by mouth once daily., Disp: , Rfl:     aspirin 81 MG Chew, Take 1 tablet (81 mg total) by mouth once daily., Disp: , Rfl: 0    atorvastatin (LIPITOR) 40 MG tablet, Take 1 tablet by mouth every evening. , Disp: , Rfl:     escitalopram oxalate (LEXAPRO) 20 MG tablet, Take 1 tablet by mouth every evening. , Disp: , Rfl:     fish oil-omega-3 fatty acids 300-1,000 mg capsule, Take 2 g by mouth once daily., Disp: , Rfl:     levothyroxine (SYNTHROID) 50 MCG tablet, TK 1 T PO QD, Disp: , Rfl: 0    lisinopril (PRINIVIL,ZESTRIL) 20 MG tablet, Take 5 mg by mouth once daily. , Disp: , Rfl:     metFORMIN (GLUCOPHAGE) 1000 MG tablet, TK 1 T PO  BID, Disp: , Rfl: 0    multivitamin capsule, Take 1 capsule by mouth once daily., Disp: , Rfl:     ondansetron (ZOFRAN) 8 MG tablet, Take 1 tablet (8 mg total) by mouth every 12 (twelve) hours as needed for Nausea., Disp: 24 tablet, Rfl: 0    pioglitazone (ACTOS) 30 MG tablet, Take 1 tablet by mouth once daily., Disp: , Rfl:     traMADoL (ULTRAM) 50 mg tablet, Take 1 tablet (50 mg total) by mouth every 6 (six) hours as needed for Pain., Disp: 28 tablet, Rfl: 0    vitamin D (VITAMIN D3) 1000 units Tab, Take 1,000 Units by mouth once daily., Disp: , Rfl:     vitamin E 400 UNIT capsule, Take 400 Units by mouth once daily., Disp: , Rfl:     Allergies:  Review of patient's allergies indicates:  No Known Allergies    Vitals:  LMP  (LMP Unknown)      Physical Examination:  Ortho Exam   Right shoulder exam:  The wound is clean, dry, and intact    Assessment:  1. Closed fracture of proximal end of right humerus with routine healing, subsequent encounter      Plan:  Staple removal today. The patient will initiate pendulums, supine passive elevation and ER exercises. Return to clinic in one month with a new XR of the right shoulder.         No follow-ups on file.

## 2022-05-16 DIAGNOSIS — M25.511 RIGHT SHOULDER PAIN, UNSPECIFIED CHRONICITY: Primary | ICD-10-CM

## 2022-05-17 ENCOUNTER — ANESTHESIA (OUTPATIENT)
Dept: ENDOSCOPY | Facility: HOSPITAL | Age: 62
End: 2022-05-17
Payer: COMMERCIAL

## 2022-05-17 ENCOUNTER — ANESTHESIA EVENT (OUTPATIENT)
Dept: ENDOSCOPY | Facility: HOSPITAL | Age: 62
End: 2022-05-17
Payer: COMMERCIAL

## 2022-05-17 ENCOUNTER — HOSPITAL ENCOUNTER (OUTPATIENT)
Facility: HOSPITAL | Age: 62
Discharge: HOME | End: 2022-05-17
Attending: INTERNAL MEDICINE | Admitting: INTERNAL MEDICINE
Payer: COMMERCIAL

## 2022-05-17 VITALS
DIASTOLIC BLOOD PRESSURE: 76 MMHG | RESPIRATION RATE: 18 BRPM | TEMPERATURE: 98 F | SYSTOLIC BLOOD PRESSURE: 130 MMHG | OXYGEN SATURATION: 100 % | WEIGHT: 180 LBS | HEIGHT: 60 IN | HEART RATE: 74 BPM | BODY MASS INDEX: 35.34 KG/M2

## 2022-05-17 DIAGNOSIS — R19.5 POSITIVE COLORECTAL CANCER SCREENING USING COLOGUARD TEST: ICD-10-CM

## 2022-05-17 LAB — GLUCOSE SERPL-MCNC: 121 MG/DL (ref 70–110)

## 2022-05-17 PROCEDURE — 45378 PR COLONOSCOPY,DIAGNOSTIC: ICD-10-PCS | Mod: ,,, | Performed by: INTERNAL MEDICINE

## 2022-05-17 PROCEDURE — 25000003 PHARM REV CODE 250: Performed by: INTERNAL MEDICINE

## 2022-05-17 PROCEDURE — 25000003 PHARM REV CODE 250: Performed by: NURSE ANESTHETIST, CERTIFIED REGISTERED

## 2022-05-17 PROCEDURE — 45378 DIAGNOSTIC COLONOSCOPY: CPT | Mod: ,,, | Performed by: INTERNAL MEDICINE

## 2022-05-17 PROCEDURE — 63600175 PHARM REV CODE 636 W HCPCS: Performed by: NURSE ANESTHETIST, CERTIFIED REGISTERED

## 2022-05-17 PROCEDURE — 37000008 HC ANESTHESIA 1ST 15 MINUTES: Performed by: INTERNAL MEDICINE

## 2022-05-17 PROCEDURE — 45378 DIAGNOSTIC COLONOSCOPY: CPT | Performed by: INTERNAL MEDICINE

## 2022-05-17 PROCEDURE — 37000009 HC ANESTHESIA EA ADD 15 MINS: Performed by: INTERNAL MEDICINE

## 2022-05-17 RX ORDER — LIDOCAINE HCL/PF 100 MG/5ML
SYRINGE (ML) INTRAVENOUS
Status: DISCONTINUED | OUTPATIENT
Start: 2022-05-17 | End: 2022-05-17

## 2022-05-17 RX ORDER — PROPOFOL 10 MG/ML
VIAL (ML) INTRAVENOUS
Status: DISCONTINUED | OUTPATIENT
Start: 2022-05-17 | End: 2022-05-17

## 2022-05-17 RX ORDER — SODIUM CHLORIDE 9 MG/ML
INJECTION, SOLUTION INTRAVENOUS CONTINUOUS
Status: DISCONTINUED | OUTPATIENT
Start: 2022-05-17 | End: 2022-05-17 | Stop reason: HOSPADM

## 2022-05-17 RX ORDER — PROPOFOL 10 MG/ML
VIAL (ML) INTRAVENOUS CONTINUOUS PRN
Status: DISCONTINUED | OUTPATIENT
Start: 2022-05-17 | End: 2022-05-17

## 2022-05-17 RX ORDER — DEXTROMETHORPHAN/PSEUDOEPHED 2.5-7.5/.8
DROPS ORAL
Status: COMPLETED | OUTPATIENT
Start: 2022-05-17 | End: 2022-05-17

## 2022-05-17 RX ORDER — SODIUM CHLORIDE 0.9 % (FLUSH) 0.9 %
10 SYRINGE (ML) INJECTION
Status: DISCONTINUED | OUTPATIENT
Start: 2022-05-17 | End: 2022-05-17 | Stop reason: HOSPADM

## 2022-05-17 RX ADMIN — SODIUM CHLORIDE: 0.9 INJECTION, SOLUTION INTRAVENOUS at 12:05

## 2022-05-17 RX ADMIN — PROPOFOL 150 MCG/KG/MIN: 10 INJECTION, EMULSION INTRAVENOUS at 01:05

## 2022-05-17 RX ADMIN — PROPOFOL 50 MG: 10 INJECTION, EMULSION INTRAVENOUS at 01:05

## 2022-05-17 RX ADMIN — LIDOCAINE HYDROCHLORIDE 60 MG: 20 INJECTION, SOLUTION INTRAVENOUS at 01:05

## 2022-05-17 RX ADMIN — SIMETHICONE 40 MG: 20 SUSPENSION/ DROPS ORAL at 01:05

## 2022-05-17 NOTE — ANESTHESIA PREPROCEDURE EVALUATION
2022  Etta Irwin is a 62 y.o., female for colonoscopy under MAC    Past Medical History:   Diagnosis Date    Depression     Diabetes mellitus     Hyperlipidemia     Hypertension     Osteopenia      Past Surgical History:   Procedure Laterality Date     SECTION      CHOLECYSTECTOMY      Laparoscopic    KNEE ARTHROPLASTY Left 2021    Procedure: ARTHROPLASTY, KNEE;  Surgeon: Jovi Valdovinos MD;  Location: [facility] OR;  Service: Orthopedics;  Laterality: Left;  Depuy notified 21 CC  Elías w/ Manuelitouy confirmed 21 MN    OPEN REDUCTION AND INTERNAL FIXATION (ORIF) OF FRACTURE OF PROXIMAL HUMERUS Right 2022    Procedure: ORIF, FRACTURE, HUMERUS, PROXIMAL;  Surgeon: Dwain Galaviz MD;  Location: [facility] OR;  Service: Orthopedics;  Laterality: Right;  Synthes proximal humerus plates, beach chair position, open shoulder retractor set  Adonay confirmed 22 AM     Pre-op Assessment    I have reviewed the Patient Summary Reports.    I have reviewed the Nursing Notes. I have reviewed the NPO Status.   I have reviewed the Medications.     Review of Systems  Anesthesia Hx:  No problems with previous Anesthesia  Denies Family Hx of Anesthesia complications.    Social:  Non-Smoker, No Alcohol Use    Cardiovascular:   Hypertension, well controlled hyperlipidemia  Denies LOPEZ. ECG has been reviewed.    Pulmonary:  Pulmonary Normal    Renal/:  Renal/ Normal     Hepatic/GI:  Hepatic/GI Normal    Neurological:  Neurology Normal    Endocrine:   Diabetes, well controlled, type 2    Psych:   depression          Physical Exam  General:  Obesity, Well nourished, Cooperative, Alert and Oriented      Airway/Jaw/Neck:  Airway Findings: Mouth Opening: Normal   Tongue: Normal   General Airway Assessment: Adult Mallampati: III  TM Distance: Normal, at least 6 cm   Neck ROM: Normal ROM        Dental:  Dental Findings: In tact     Chest/Lungs:  Chest/Lungs Findings: Normal Respiratory Rate      Heart/Vascular:  Heart Findings: Rate: Normal     Abdomen:  Abdomen Findings: (obese)       Mental Status:  Mental Status Findings:  Cooperative, Alert and Oriented         Anesthesia Plan  Type of Anesthesia, risks & benefits discussed:  Anesthesia Type:  MAC    Patient's Preference:   Plan Factors:          Intra-op Monitoring Plan: Standard ASA Monitors  Intra-op Monitoring Plan Comments:   Post Op Pain Control Plan:   Post Op Pain Control Plan Comments:     Induction:    Beta Blocker:  Patient is not currently on a Beta-Blocker (No further documentation required).       Informed Consent: Informed consent signed with the Patient and all parties understand the risks and agree with anesthesia plan.  All questions answered.  Anesthesia consent signed with patient.  ASA Score: 2     Day of Surgery Review of History & Physical:  There are no significant changes.            Ready For Surgery From Anesthesia Perspective.           Physical Exam  General: Obesity, Well nourished, Cooperative, Alert and Oriented    Airway:  Mallampati: III   Mouth Opening: Normal  TM Distance: Normal, at least 6 cm  Tongue: Normal  Neck ROM: Normal ROM    Dental:  In tact    Chest/Lungs:  Normal Respiratory Rate    Heart:  Rate: Normal          Anesthesia Plan  Type of Anesthesia, risks & benefits discussed:    Anesthesia Type: MAC  Intra-op Monitoring Plan: Standard ASA Monitors  Informed Consent: Informed consent signed with the Patient and all parties understand the risks and agree with anesthesia plan.  All questions answered.   ASA Score: 2    Ready For Surgery From Anesthesia Perspective.       .

## 2022-05-17 NOTE — H&P
Short Stay Endoscopy History and Physical    PCP - Hesham Villalba MD    Procedure - Colonoscopy  Sedation: GA  ASA - per anesthesia  Mallampati - per anesthesia  History of Anesthesia problems - no  Family history Anesthesia problems -  no     HPI:  This is a 62 y.o. female here for evaluation of :  Positive Cologuard test    Reflux - no  Dysphagia - no  Abdominal pain - no  Diarrhea - no    ROS:  Constitutional: No fevers, chills, No weight loss  ENT: No allergies  CV: No chest pain  Pulm: No cough, No shortness of breath  Ophtho: No vision changes  GI: see HPI  Medical History:  has a past medical history of Depression, Diabetes mellitus, Hyperlipidemia, Hypertension, and Osteopenia.    Surgical History:  has a past surgical history that includes  section; Cholecystectomy; Knee Arthroplasty (Left, 2021); and Open reduction and internal fixation (ORIF) of fracture of proximal humerus (Right, 2022).    Family History: family history includes Uterine cancer in her mother.. Otherwise no colon cancer, inflammatory bowel disease, or GI malignancies.    Social History:  reports that she has never smoked. She has never used smokeless tobacco. She reports that she does not drink alcohol and does not use drugs.    Review of patient's allergies indicates:  No Known Allergies    Medications:   Medications Prior to Admission   Medication Sig Dispense Refill Last Dose    amLODIPine (NORVASC) 5 MG tablet Take 5 mg by mouth once daily.   2022 at 0700    atorvastatin (LIPITOR) 40 MG tablet Take 1 tablet by mouth every evening.    2022 at 0700    escitalopram oxalate (LEXAPRO) 20 MG tablet Take 1 tablet by mouth every evening.    2022 at 0700    lisinopril (PRINIVIL,ZESTRIL) 20 MG tablet Take 5 mg by mouth once daily.    2022 at 0700    metFORMIN (GLUCOPHAGE) 1000 MG tablet TK 1 T PO  BID  0 2022 at 0700    ondansetron (ZOFRAN) 8 MG tablet Take 1 tablet (8 mg total) by mouth every  12 (twelve) hours as needed for Nausea. 24 tablet 0 Past Month at Unknown time    pioglitazone (ACTOS) 30 MG tablet Take 1 tablet by mouth once daily.   5/16/2022 at 0700    alendronate (FOSAMAX) 70 MG tablet Take 1 tablet by mouth once daily.   5/12/2022    aspirin 81 MG Chew Take 1 tablet (81 mg total) by mouth once daily.  0     fish oil-omega-3 fatty acids 300-1,000 mg capsule Take 2 g by mouth once daily.   5/15/2022    levothyroxine (SYNTHROID) 50 MCG tablet TK 1 T PO QD  0 5/15/2022    multivitamin capsule Take 1 capsule by mouth once daily.   5/15/2022    traMADoL (ULTRAM) 50 mg tablet Take 1 tablet (50 mg total) by mouth every 6 (six) hours as needed for Pain. 28 tablet 0 5/14/2022    vitamin D (VITAMIN D3) 1000 units Tab Take 1,000 Units by mouth once daily.   5/15/2022    vitamin E 400 UNIT capsule Take 400 Units by mouth once daily.   5/15/2022       Objective Findings:    Vital Signs: Per nursing notes.    Physical Exam:  General Appearance: Well appearing in no acute distress  Head:   Normocephalic, without obvious abnormality  Eyes:    No scleral icterus  Airway: Open  Neck: No restriction in mobility  Lungs: CTA bilaterally in anterior and posterior fields, no wheezes, no crackles.  Heart:  Regular rate and rhythm, S1, S2 normal, no murmurs heard  Abdomen: Soft, non tender, non distended      Labs:  Lab Results   Component Value Date    WBC 7.13 04/26/2022    HGB 11.5 (L) 04/26/2022    HCT 36.4 (L) 04/26/2022     04/26/2022    ALT 32 04/12/2021    AST 28 04/12/2021     04/26/2022    K 3.9 04/26/2022     04/26/2022    CREATININE 0.7 04/26/2022    BUN 19 04/26/2022    CO2 28 04/26/2022    HGBA1C 5.6 04/29/2021         I have explained the risks and benefits of endoscopy procedures to the patient including but not limited to bleeding, perforation, infection, and death.    Thank you so much for allowing me to participate in the care of Etta Vizcarra,  MD

## 2022-05-17 NOTE — PROVATION PATIENT INSTRUCTIONS
Discharge Summary/Instructions after an Endoscopic Procedure  Patient Name: Etta Irwin  Patient MRN: [id number]  Patient YOB: 1960  Tuesday, May 17, 2022  Sebastián Vizcarra MD  Dear patient,  As a result of recent federal legislation (The Federal Cures Act), you may   receive lab or pathology results from your procedure in your [facility]   account before your physician is able to contact you. Your physician or   their representative will relay the results to you with their   recommendations at their soonest availability.  Thank you,  Your health is very important to us during the Covid Crisis. Following your   procedure today, you will receive a daily text for 2 weeks asking about   signs or symptoms of Covid 19.  Please respond to this text when you   receive it so we can follow up and keep you as safe as possible.   RESTRICTIONS:  During your procedure today, you received medications for sedation.  These   medications may affect your judgment, balance and coordination.  Therefore,   for 24 hours, you have the following restrictions:   - DO NOT drive a car, operate machinery, make legal/financial decisions,   sign important papers or drink alcohol.    ACTIVITY:  Today: no heavy lifting, straining or running due to procedural   sedation/anesthesia.  The following day: return to full activity including work.  DIET:  Eat and drink normally unless instructed otherwise.     TREATMENT FOR COMMON SIDE EFFECTS:  - Mild abdominal pain, nausea, belching, bloating or excessive gas:  rest,   eat lightly and use a heating pad.  - Sore Throat: treat with throat lozenges and/or gargle with warm salt   water.  - Because air was used during the procedure, expelling large amounts of air   from your rectum or belching is normal.  - If a bowel prep was taken, you may not have a bowel movement for 1-3 days.    This is normal.  SYMPTOMS TO WATCH FOR AND REPORT TO YOUR PHYSICIAN:  1. Abdominal pain or bloating, other  than gas cramps.  2. Chest pain.  3. Back pain.  4. Signs of infection such as: chills or fever occurring within 24 hours   after the procedure.  5. Rectal bleeding, which would show as bright red, maroon, or black stools.   (A tablespoon of blood from the rectum is not serious, especially if   hemorrhoids are present.)  6. Vomiting.  7. Weakness or dizziness.  GO DIRECTLY TO THE NEAREST EMERGENCY ROOM IF YOU HAVE ANY OF THE FOLLOWING:      Difficulty breathing              Chills and/or fever over 101 F   Persistent vomiting and/or vomiting blood   Severe abdominal pain   Severe chest pain   Black, tarry stools   Bleeding- more than one tablespoon   Any other symptom or condition that you feel may need urgent attention  Your doctor recommends these additional instructions:  If any biopsies were taken, your doctors clinic will contact you in 1 to 2   weeks with any results.  - Patient has a contact number available for emergencies.  The signs and   symptoms of potential delayed complications were discussed with the   patient.  Return to normal activities tomorrow.  Written discharge   instructions were provided to the patient.   - Discharge patient to home.   - Resume previous diet.   - Continue present medications.   - Repeat colonoscopy in 5 years for screening purposes.   For questions, problems or results please call your physician - Sebastián Vizcrara MD.  EMERGENCY PHONE NUMBER: [phone],  LAB RESULTS: [phone]  IF A COMPLICATION OR EMERGENCY SITUATION ARISES AND YOU ARE UNABLE TO REACH   YOUR PHYSICIAN - GO DIRECTLY TO THE EMERGENCY ROOM.  Sebastián Vizcarra MD  5/17/2022 1:44:16 PM  This report has been verified and signed electronically.  Dear patient,  As a result of recent federal legislation (The Federal Cures Act), you may   receive lab or pathology results from your procedure in your [facility]   account before your physician is able to contact you. Your physician or   their representative  will relay the results to you with their   recommendations at their soonest availability.  Thank you,  PROVATION

## 2022-05-17 NOTE — ANESTHESIA POSTPROCEDURE EVALUATION
Anesthesia Post Evaluation    Patient: Etta Irwin    Procedure(s) Performed: Procedure(s) (LRB):  COLONOSCOPY (N/A)    Final Anesthesia Type: MAC      Patient location during evaluation: GI PACU  Patient participation: Yes- Able to Participate  Level of consciousness: awake and alert  Post-procedure vital signs: reviewed and stable  Pain management: adequate  Airway patency: patent    PONV status at discharge: No PONV  Anesthetic complications: no      Cardiovascular status: hemodynamically stable  Respiratory status: unassisted, spontaneous ventilation and room air  Hydration status: euvolemic  Follow-up not needed.          Vitals Value Taken Time   BP  05/17/22 1346   Temp  05/17/22 1346   Pulse  05/17/22 1346   Resp  05/17/22 1346   SpO2  05/17/22 1346         No case tracking events are documented in the log.      Pain/Danitza Score: No data recorded

## 2022-05-17 NOTE — TRANSFER OF CARE
Anesthesia Transfer of Care Note    Patient: Etta Irwin    Procedure(s) Performed: Procedure(s) (LRB):  COLONOSCOPY (N/A)    Patient location: GI    Anesthesia Type: MAC    Transport from OR: Transported from OR on room air with adequate spontaneous ventilation    Post pain: adequate analgesia    Post assessment: no apparent anesthetic complications and tolerated procedure well    Post vital signs: stable    Level of consciousness: responds to stimulation and sedated    Nausea/Vomiting: no nausea/vomiting    Complications: none    Transfer of care protocol was followed      Last vitals:   Visit Vitals  /62 (BP Location: Left arm, Patient Position: Lying)   Pulse 85   Temp 36.4 °C (97.5 °F) (Skin)   Resp 18   Ht 5' (1.524 m)   Wt 81.6 kg (180 lb)   LMP  (LMP Unknown)   SpO2 100%   Breastfeeding No   BMI 35.15 kg/m²

## 2022-05-18 ENCOUNTER — TELEPHONE (OUTPATIENT)
Dept: ENDOSCOPY | Facility: HOSPITAL | Age: 62
End: 2022-05-18
Payer: COMMERCIAL

## 2022-05-18 LAB — POCT GLUCOSE: 121 MG/DL (ref 70–110)

## 2022-05-19 ENCOUNTER — PATIENT MESSAGE (OUTPATIENT)
Dept: ORTHOPEDICS | Facility: CLINIC | Age: 62
End: 2022-05-19
Payer: COMMERCIAL

## 2022-05-19 RX ORDER — TRAMADOL HYDROCHLORIDE 50 MG/1
50 TABLET ORAL EVERY 6 HOURS PRN
Qty: 28 TABLET | Refills: 0 | Status: SHIPPED | OUTPATIENT
Start: 2022-05-19 | End: 2022-05-26

## 2022-05-25 ENCOUNTER — TELEPHONE (OUTPATIENT)
Dept: ORTHOPEDICS | Facility: CLINIC | Age: 62
End: 2022-05-25
Payer: COMMERCIAL

## 2022-05-25 NOTE — TELEPHONE ENCOUNTER
----- Message from Chrissy Nunez sent at 5/25/2022  9:54 AM CDT -----  Contact: pt  Type:  Same Day Appointment Request    Caller is requesting a same day appointment.  Caller declined first available appointment listed below.    Name of Caller: pt  When is the first available appointment? Not sure  Symptoms: pt had a fall and having knee pain   Best Call Back Number: [phone]  Additional Information:

## 2022-05-25 NOTE — TELEPHONE ENCOUNTER
Spoke with patient - patient states she had a fall and was advise to reach out to staff to follow up with surgeon for appointment. Appointment schedule on 6/30 with Dr. Valdovinos.

## 2022-05-26 ENCOUNTER — HOSPITAL ENCOUNTER (EMERGENCY)
Facility: HOSPITAL | Age: 62
Discharge: HOME | End: 2022-05-26
Attending: EMERGENCY MEDICINE
Payer: COMMERCIAL

## 2022-05-26 ENCOUNTER — PATIENT OUTREACH (OUTPATIENT)
Dept: EMERGENCY MEDICINE | Facility: HOSPITAL | Age: 62
End: 2022-05-26
Payer: COMMERCIAL

## 2022-05-26 VITALS
TEMPERATURE: 98 F | DIASTOLIC BLOOD PRESSURE: 68 MMHG | OXYGEN SATURATION: 100 % | BODY MASS INDEX: 35.34 KG/M2 | HEART RATE: 66 BPM | WEIGHT: 180 LBS | HEIGHT: 60 IN | RESPIRATION RATE: 16 BRPM | SYSTOLIC BLOOD PRESSURE: 149 MMHG

## 2022-05-26 DIAGNOSIS — M25.529 ELBOW PAIN: ICD-10-CM

## 2022-05-26 DIAGNOSIS — M79.639 FOREARM PAIN: Primary | ICD-10-CM

## 2022-05-26 DIAGNOSIS — T78.40XA ALLERGIC REACTION, INITIAL ENCOUNTER: ICD-10-CM

## 2022-05-26 PROCEDURE — 25000003 PHARM REV CODE 250: Performed by: PHYSICIAN ASSISTANT

## 2022-05-26 PROCEDURE — 63600175 PHARM REV CODE 636 W HCPCS: Performed by: PHYSICIAN ASSISTANT

## 2022-05-26 PROCEDURE — 99284 EMERGENCY DEPT VISIT MOD MDM: CPT | Mod: 25

## 2022-05-26 PROCEDURE — 96372 THER/PROPH/DIAG INJ SC/IM: CPT | Performed by: PHYSICIAN ASSISTANT

## 2022-05-26 RX ORDER — DIPHENHYDRAMINE HCL 25 MG
25 CAPSULE ORAL EVERY 6 HOURS PRN
Qty: 20 CAPSULE | Refills: 0 | Status: SHIPPED | OUTPATIENT
Start: 2022-05-26 | End: 2022-07-06

## 2022-05-26 RX ORDER — ACETAMINOPHEN 500 MG
500 TABLET ORAL EVERY 6 HOURS PRN
COMMUNITY

## 2022-05-26 RX ORDER — AMITRIPTYLINE HYDROCHLORIDE 50 MG/1
50 TABLET, FILM COATED ORAL NIGHTLY
COMMUNITY
Start: 2022-05-17

## 2022-05-26 RX ORDER — FAMOTIDINE 20 MG/1
20 TABLET, FILM COATED ORAL 2 TIMES DAILY
Qty: 20 TABLET | Refills: 0 | Status: SHIPPED | OUTPATIENT
Start: 2022-05-26 | End: 2023-05-26

## 2022-05-26 RX ORDER — DIPHENHYDRAMINE HYDROCHLORIDE 50 MG/ML
25 INJECTION INTRAMUSCULAR; INTRAVENOUS
Status: COMPLETED | OUTPATIENT
Start: 2022-05-26 | End: 2022-05-26

## 2022-05-26 RX ORDER — FAMOTIDINE 20 MG/1
20 TABLET, FILM COATED ORAL
Status: COMPLETED | OUTPATIENT
Start: 2022-05-26 | End: 2022-05-26

## 2022-05-26 RX ADMIN — FAMOTIDINE 20 MG: 20 TABLET ORAL at 11:05

## 2022-05-26 RX ADMIN — DIPHENHYDRAMINE HYDROCHLORIDE 25 MG: 50 INJECTION INTRAMUSCULAR; INTRAVENOUS at 11:05

## 2022-05-26 NOTE — PROGRESS NOTES
Pierre Dillard  ED Navigator  Emergency Department    [place]: [facility] [place]  Role: Community Health Worker    Date: 05/26/2022  Patient Name: Etta Irwin  MRN: [id number]  PCP: Hesham Villalba MD    Assessment:     Etta Irwin is a 62 y.o. female who has presented to ED for elbow/arm pain. Patient has visited the ED 2 times in the past 3 months. Patient did contact PCP.     ED Navigator Initial Assessment    ED Navigator Enrollment Documentation  Consent to Services  Does patient consent to completing the assessment?: Yes  Contact  Method of Initial Contact: Face to Face  Transportation  Does the patient have issues with Transportation?: No  Does the patient have transportation to and from healthcare appointments?: Yes  Insurance Coverage  Do you have coverage/adequate coverage?: Yes  Type/kind of coverage: BCBS  Is patient able to afford co-pays/deductibles?: Yes  Is patient able to afford HME or supplies?: Yes  Does patient have an established [facility] PCP?: Yes  Able to access?: Yes  Does the patient have a lack of adequate coverage?: No  Specialist Appointment  Did the patient come to the ED to see a specialist?: No  Does the patient have a pending specialist referral?: No  Does the patient have a specialist appointment made?: No  PCP Follow Up Appointment  Has the patient had an appointment with a primary care provider in the past year?: Yes  Approximate date: 1/26/22  Provider: Hesham Villalba MD  Does the patient have a follow up appontment with a PCP?: Yes  Upcoming appointment date: 6/2/22  Provider: Lucita Rivera MD  When was the last time you saw your PCP?: 1/26/22  Medications  Is patient able to afford medication?: Yes  Is patient unable to get medication due to lack of transportation?: No  Psychological  Does the patient have psycho-social concerns?: No  Food  Does the patient have concerns about food?: No  Communication/Education  Does the patient have limited English proficiency/English  not primary language?: No  Does patient have low literacy and/or low health literacy?: No  Does patient have concerns with care?: No  Does patient have dissatisfaction with care?: No  Other Financial Concerns  Does the patient have immediate financial distress?: No  Does the patient have general financial concerns?: No  Other Social Barriers/Concerns  Does the patient have any additional barriers or concerns?: Work  Primary Barrier  Barriers identified: Structural barrier (service availability, waiting times, etc.)  Root Cause of ED Utilization: Lack of Access to Primary Care  Plan to address Lack of Access to Primary Care: Provided patient with information about the [facility] in their area, Provided information for [facility] (HC - [facility], [facility], [facility], etc.), Provided information for [facility] 24/7 Nurse Triage line [phone] or 1-866-[facility] ([phone]), Provided information on COVID-19 resources and hotline ([phone])  Next steps: Provided Education, Scheduled Appointment/Referral, Set-up Appointment Notifications  Scheduled Appointment Date: 6/2/22  Appointment Reminder Date: 5/31/22  Was education/educational materials provided surrounding PCP services/creating a medical home?: Yes Was education verbal or written?: Written   Was education/educational materials provided surrounding low cost, healthy foods?: No    Was education/educational materials provided surrounding other items? If so, use comment to explain.: No    Plan: Provided information for [facility] 24/7 Nurse triage line, [phone] or 1-[facility] ([phone])  Expected Date of Follow Up 1: 6/9/22  Additional Documentation: Spoke with patient that presented to the ED for elbow/arm pain. Patient stated she was in pain and itching. Patient was asked if she had a PCP to follow up with, she stated she does but would be [facility]  [facility]. Patient has been scheduled for 6/2/22 @ 9am with Dr. Lucita Rivera. Patient denied needing any other assistance at this time.         Social History     Socioeconomic History    Marital status:    Tobacco Use    Smoking status: Never Smoker    Smokeless tobacco: Never Used   Substance and Sexual Activity    Alcohol use: No    Drug use: No    Sexual activity: Yes     Partners: Male     Social Determinants of Health     Financial Resource Strain: Low Risk     Difficulty of Paying Living Expenses: Not very hard   Food Insecurity: No Food Insecurity    Worried About Running Out of Food in the Last Year: Never true    Ran Out of Food in the Last Year: Never true   Transportation Needs: No Transportation Needs    Lack of Transportation (Medical): No    Lack of Transportation (Non-Medical): No   Physical Activity: Inactive    Days of Exercise per Week: 0 days    Minutes of Exercise per Session: 0 min   Stress: Stress Concern Present    Feeling of Stress : To some extent   Social Connections: Moderately Isolated    Frequency of Communication with Friends and Family: More than three times a week    Frequency of Social Gatherings with Friends and Family: More than three times a week    Attends Hoahaoism Services: Never    Active Member of Clubs or Organizations: No    Attends Club or Organization Meetings: Never    Marital Status:    Housing Stability: Unknown    Unable to Pay for Housing in the Last Year: No    Unstable Housing in the Last Year: No       Plan:     Spoke with patient that presented to the ED for elbow/arm pain. Patient stated she was in pain and itching. Patient was asked if she had a PCP to follow up with, she stated she does but would be interested in obtaining [facility] provider. Patient has been scheduled for 6/2/22 @ 9am with Dr. Lucita Rivera. Patient denied needing any other assistance at this time.      Appointment made with: Hesham Choe  MD Deejay

## 2022-05-26 NOTE — ED PROVIDER NOTES
Encounter Date: 2022       History     Chief Complaint   Patient presents with    Medication Reaction     Pt was given tramadol 1 week ago estimated, and now complains of itching all over, denies any new environmental/household changes, took benadryl yesterday and still complains of itching, no hives/ airway compromise noted     Arm Pain     Pt had fall about 4 weeks ago, had right shoulder sx, complains of continued right forearm pain, no deformity, +2 radial pulse      62-year-old female presents to ED with concern of right-sided arm pain along with possible allergic reaction.  Patient had fallen  resulting in right humerus fracture.  She underwent ORIF on .  She reports since her fall she only had images performed of her left shoulder and humerus but has continued to have pain throughout her left elbow and forearm.  Pain is dull, nonradiating, severity 6/10.  No numbness or focal weakness.  Denies significant skin changes or swelling.  She has had progressive improvement of postoperative pain to her right shoulder sensor surgery.  She has continued taking tramadol for her pain symptoms, but is concern for possible allergy to tramadol stating it is making her itch.  Denies throat or tongue swelling, difficulty breathing, shortness of breath or wheezing.  She has tried home Benadryl with minimal improvement of her itching.  No fevers, chills, nausea, vomiting, chest pain, cough, abdominal pain, numbness, focal weakness.  Denies environmental changes, new soaps, conditioners, shampoos, detergents, dietary changes or recent travel.  No other acute complaints at this time.    The history is provided by the patient.     Review of patient's allergies indicates:  No Known Allergies  Past Medical History:   Diagnosis Date    Depression     Diabetes mellitus     Hyperlipidemia     Hypertension     Osteopenia      Past Surgical History:   Procedure Laterality Date     SECTION       CHOLECYSTECTOMY      Laparoscopic    COLONOSCOPY N/A 5/17/2022    Procedure: COLONOSCOPY;  Surgeon: Sebastián Vizcarra MD;  Location: [facility] ENDO;  Service: Endoscopy;  Laterality: N/A;    KNEE ARTHROPLASTY Left 4/29/2021    Procedure: ARTHROPLASTY, KNEE;  Surgeon: Jovi Valdovinos MD;  Location: [facility] OR;  Service: Orthopedics;  Laterality: Left;  Depuy notified 4/19/21 CC  Elías w/ Depuy confirmed 4/28/21 [place]    OPEN REDUCTION AND INTERNAL FIXATION (ORIF) OF FRACTURE OF PROXIMAL HUMERUS Right 4/28/2022    Procedure: ORIF, FRACTURE, HUMERUS, PROXIMAL;  Surgeon: Dwain Galaviz MD;  Location: [facility] OR;  Service: Orthopedics;  Laterality: Right;  Synthes proximal humerus plates, beach chair position, open shoulder retractor set  Adonay confirmed 4/27/22 AM     Family History   Problem Relation Age of Onset    Uterine cancer Mother      Social History     Tobacco Use    Smoking status: Never Smoker    Smokeless tobacco: Never Used   Substance Use Topics    Alcohol use: No    Drug use: No     Review of Systems   Constitutional: Negative for chills and fever.   Respiratory: Negative for cough and shortness of breath.    Cardiovascular: Negative for chest pain.   Gastrointestinal: Negative for abdominal pain, diarrhea, nausea and vomiting.   Musculoskeletal: Positive for arthralgias. Negative for back pain, neck pain and neck stiffness.   Neurological: Negative for weakness and numbness.       Physical Exam     Initial Vitals [05/26/22 0924]   BP Pulse Resp Temp SpO2   137/66 82 18 98.3 °F (36.8 °C) 97 %      MAP       --         Physical Exam    Nursing note and vitals reviewed.  Constitutional: Vital signs are normal. She appears well-developed and well-nourished. She is cooperative. She does not have a sickly appearance. She does not appear ill. No distress.   HENT:   Head: Normocephalic and atraumatic.   Eyes: EOM are normal.   Neck: Neck supple.   Normal range of motion.  Cardiovascular: Normal rate, regular  rhythm and normal heart sounds.   Pulmonary/Chest: Effort normal and breath sounds normal.   Abdominal: Abdomen is soft.   Musculoskeletal:      Cervical back: Normal range of motion and neck supple.      Comments: Incision site to right anterior shoulder appearing to be healing well.  No significant tenderness to site.  Patient reporting primary pain to right forearm with no physical or palpable deformities.  No overlying skin changes or bruising.  No significant swelling.  Compartments are soft.  Full ROM throughout left elbow and wrist with appropriate  strength and sensations.  Appropriate radial pulse.     Neurological: She is alert. She is not disoriented. GCS eye subscore is 4. GCS verbal subscore is 5. GCS motor subscore is 6.   Skin: Skin is warm and dry.   Psychiatric: She has a normal mood and affect. Her speech is normal and behavior is normal. Thought content normal.         ED Course   Procedures  Labs Reviewed - No data to display       Imaging Results          US Upper Extremity Veins Right (Final result)  Result time 05/26/22 12:50:45    Final result by Mckay Lala MD (05/26/22 12:50:45)                 Impression:      No thrombus in central veins of the right upper extremity.      Electronically signed by: Mckay Lala MD  Date:    05/26/2022  Time:    12:50             Narrative:    EXAMINATION:  US UPPER EXTREMITY VEINS RIGHT    CLINICAL HISTORY:  right arm pain;    TECHNIQUE:  Duplex and color flow Doppler evaluation and dynamic compression was performed of the right upper extremity veins.    COMPARISON:  None    FINDINGS:  Central veins: The internal jugular, subclavian, and axillary veins are patent and free of thrombus.    Arm veins: The brachial, basilic, and cephalic veins are patent and compressible.    Contralateral subclavian/internal jugular veins: The left subclavian and internal jugular veins are patent and free of thrombus.    Dedicated sonography was performed of the  area of concern within the right forearm.  No discrete subcutaneous abnormalities identified.                               X-Ray Elbow Complete Right (Final result)  Result time 05/26/22 10:20:19   Procedure changed from X-Ray Elbow Complete Left     Final result by Amos Greco MD (05/26/22 10:20:19)                 Impression:      No acute displaced fracture-dislocation identified.      Electronically signed by: Amos Greco MD  Date:    05/26/2022  Time:    10:20             Narrative:    EXAMINATION:  XR ELBOW COMPLETE 3 VIEW RIGHT    CLINICAL HISTORY:  pain;. Pain in unspecified elbow    TECHNIQUE:  AP, lateral, and oblique views of the right elbow; AP and lateral views right forearm    COMPARISON:  None    FINDINGS:  Bones are well mineralized. Overall alignment is within normal limits.  No displaced fracture, dislocation or destructive osseous process.  No large elbow joint effusion.  Minimal degenerative change at the elbow.  No subcutaneous emphysema or radiodense retained foreign body.                               X-Ray Forearm Right (Final result)  Result time 05/26/22 10:31:42   Procedure changed from X-Ray Forearm Left     Final result by Amos Greco MD (05/26/22 10:31:42)                 Impression:      As above.      Electronically signed by: Amos Greco MD  Date:    05/26/2022  Time:    10:31             Narrative:    EXAMINATION:  XR FOREARM RIGHT    CLINICAL HISTORY:  pain;Pain in unspecified forearm    TECHNIQUE:  AP and lateral views of the right forearm were performed.    COMPARISON:  None    FINDINGS:  Please refer to separately interpreted right elbow series same date further details.                                 Medications   diphenhydrAMINE injection 25 mg (25 mg Intramuscular Given 5/26/22 1103)   famotidine tablet 20 mg (20 mg Oral Given 5/26/22 1144)     Medical Decision Making:   Initial Assessment:   Patient presents with concern of right forearm pain, along with concern  itching and allergic reaction to her pain medications.  S/p ORIF of right shoulder on 04/28 with progressive improvement of right shoulder pain.  No numbness or focal weakness.  Afebrile with vitals WNL.  On exam, tenderness noted to right forearm with no physical or palpable deformities.  No overlying skin changes or bruising.  Compartment soft.  Neurovascular intact.  Differential Diagnosis:   Postoperative pain, radiculopathy, neuropathy, strain, sprain, contusion, dislocation, fracture, DVT, allergy, irritant, less likely anaphylaxis  ED Management:  Patient given Benadryl and Pepcid in ED for her complaint of itching and possible allergic reaction to her pain medication, tramadol.  She denies any current symptoms or has exam findings concerning for anaphylaxis.  She does report improvement of her symptoms with Benadryl and Pepcid.    Imaging of right elbow and forearm showing no acute bony abnormalities.  Ultrasound right upper extremity showing no acute signs of DVT.  With careful consideration, low concern for acute infectious process given current exam findings.  Vital signs reassuring.  Will continue with conservative care.  She will continue her home pain medications as prescribed but will discussed possible change with her orthopedic.  Prescription for Benadryl and Pepcid.  Encouraged oral hydration, monitor symptoms closely, close follow-up with ortho/PCP and high ED return precautions.  Patient states her understanding and agrees with plan.                      Clinical Impression:   Final diagnoses:  [M25.529] Elbow pain  [M79.639] Forearm pain (Primary)  [T78.40XA] Allergic reaction, initial encounter          ED Disposition Condition    Discharge Stable        ED Prescriptions     Medication Sig Dispense Start Date End Date Auth. Provider    diphenhydrAMINE (BENADRYL) 25 mg capsule Take 1 capsule (25 mg total) by mouth every 6 (six) hours as needed for Itching or Allergies. 20 capsule 5/26/2022  Clay  TY Browne    famotidine (PEPCID) 20 MG tablet Take 1 tablet (20 mg total) by mouth 2 (two) times daily. 20 tablet 5/26/2022 5/26/2023 Clay Browne PA-C        Follow-up Information     Follow up With Specialties Details Why Contact Info    Your Doctor               Clay Browne PA-C  05/26/22 [id number]

## 2022-05-26 NOTE — PHARMACY MED REC
"Admission Medication History     The home medication history was taken by Aleida Smith CPhT.    Medication history obtained from, Patient Verified    You may go to "Admission" then "Reconcile Home Medications" tabs to review and/or act upon these items.      The home medication list has been updated by the Pharmacy department.    Please read ALL comments highlighted in yellow.    Please address this information as you see fit.     Feel free to contact us if you have any questions or require assistance.      The medications listed below were removed from the home medication list.  Please reorder if appropriate:  Patient reports no longer taking the following medication(s):   Zofran 8 mg   Tramadol 50 mg        Aleida Smith CPhT.  Ext [phone]                .          "
144

## 2022-05-26 NOTE — FIRST PROVIDER EVALUATION
Emergency Department TeleTriage Encounter Note      CHIEF COMPLAINT    Chief Complaint   Patient presents with    Medication Reaction     Pt was given tramadol 1 week ago estimated, and now complains of itching all over, denies any new environmental/household changes, took benadryl yesterday and still complains of itching, no hives/ airway compromise noted     Arm Pain     Pt had fall about 4 weeks ago, had right shoulder sx, complains of continued right forearm pain, no deformity, +2 radial pulse        VITAL SIGNS   Initial Vitals [05/26/22 0924]   BP Pulse Resp Temp SpO2   137/66 82 18 98.3 °F (36.8 °C) 97 %      MAP       --            ALLERGIES    Review of patient's allergies indicates:  No Known Allergies    PROVIDER TRIAGE NOTE  This is a teletriage evaluation of a 62 y.o. female presenting to the ED complaining of itching and left arm pain.  Reports she had a fall four weeks ago and broke her shoulder.  Reports she had the shoulder repaired, but since the fall she has also been having left elbow and forearm pain.  Reports no imaging was ever taken of the lower arm because they were so focused on the shoulder.  She also reports she has been itching terribly from the pain meds.     Initial orders will be placed and care will be transferred to an alternate provider when patient is roomed for a full evaluation. Any additional orders and the final disposition will be determined by that provider.           ORDERS  Labs Reviewed - No data to display    ED Orders (720h ago, onward)    Start Ordered     Status Ordering Provider    05/26/22 0945 05/26/22 0938  diphenhydrAMINE injection 25 mg  ED 1 Time         Ordered BEATRIZ DENNIS    05/26/22 0938 05/26/22 0938  X-Ray Elbow Complete Left  1 time imaging         Ordered BEATRIZ DENNIS    05/26/22 0938 05/26/22 0938  X-Ray Forearm Left  1 time imaging         Ordered BEATRIZ DENNIS            Virtual Visit Note: The  provider triage portion of this emergency department evaluation and documentation was performed via [employer], a HIPAA-compliant telemedicine application, in concert with a tele-presenter in the room. A face to face patient evaluation with one of my colleagues will occur once the patient is placed in an emergency department room.      DISCLAIMER: This note was prepared with [employer] voice recognition transcription software. Garbled syntax, mangled pronouns, and other bizarre constructions may be attributed to that software system.

## 2022-05-26 NOTE — DISCHARGE INSTRUCTIONS

## 2022-05-29 ENCOUNTER — PATIENT MESSAGE (OUTPATIENT)
Dept: ORTHOPEDICS | Facility: CLINIC | Age: 62
End: 2022-05-29
Payer: COMMERCIAL

## 2022-05-30 ENCOUNTER — PATIENT MESSAGE (OUTPATIENT)
Dept: ORTHOPEDICS | Facility: CLINIC | Age: 62
End: 2022-05-30
Payer: COMMERCIAL

## 2022-05-30 ENCOUNTER — HOSPITAL ENCOUNTER (EMERGENCY)
Facility: HOSPITAL | Age: 62
Discharge: HOME | End: 2022-05-30
Attending: EMERGENCY MEDICINE
Payer: COMMERCIAL

## 2022-05-30 VITALS
HEART RATE: 88 BPM | TEMPERATURE: 98 F | BODY MASS INDEX: 35.15 KG/M2 | SYSTOLIC BLOOD PRESSURE: 144 MMHG | WEIGHT: 180 LBS | DIASTOLIC BLOOD PRESSURE: 85 MMHG | RESPIRATION RATE: 18 BRPM | OXYGEN SATURATION: 97 %

## 2022-05-30 DIAGNOSIS — S42.291D OTHER CLOSED DISPLACED FRACTURE OF PROXIMAL END OF RIGHT HUMERUS WITH ROUTINE HEALING, SUBSEQUENT ENCOUNTER: Primary | ICD-10-CM

## 2022-05-30 DIAGNOSIS — R21 RASH: ICD-10-CM

## 2022-05-30 DIAGNOSIS — R52 PAIN: ICD-10-CM

## 2022-05-30 LAB
ALBUMIN SERPL BCP-MCNC: 3.3 G/DL (ref 3.5–5.2)
ALP SERPL-CCNC: 115 U/L (ref 55–135)
ALT SERPL W/O P-5'-P-CCNC: 18 U/L (ref 10–44)
ANION GAP SERPL CALC-SCNC: 10 MMOL/L (ref 8–16)
AST SERPL-CCNC: 17 U/L (ref 10–40)
BASOPHILS # BLD AUTO: 0.01 K/UL (ref 0–0.2)
BASOPHILS NFR BLD: 0.2 % (ref 0–1.9)
BILIRUB SERPL-MCNC: 0.5 MG/DL (ref 0.1–1)
BUN SERPL-MCNC: 15 MG/DL (ref 8–23)
CALCIUM SERPL-MCNC: 9.1 MG/DL (ref 8.7–10.5)
CHLORIDE SERPL-SCNC: 105 MMOL/L (ref 95–110)
CO2 SERPL-SCNC: 26 MMOL/L (ref 23–29)
CREAT SERPL-MCNC: 0.7 MG/DL (ref 0.5–1.4)
CRP SERPL-MCNC: 27.3 MG/L (ref 0–8.2)
DIFFERENTIAL METHOD: ABNORMAL
EOSINOPHIL # BLD AUTO: 0.3 K/UL (ref 0–0.5)
EOSINOPHIL NFR BLD: 6.5 % (ref 0–8)
ERYTHROCYTE [DISTWIDTH] IN BLOOD BY AUTOMATED COUNT: 13.6 % (ref 11.5–14.5)
ERYTHROCYTE [SEDIMENTATION RATE] IN BLOOD BY WESTERGREN METHOD: 62 MM/HR (ref 0–20)
EST. GFR  (AFRICAN AMERICAN): >60 ML/MIN/1.73 M^2
EST. GFR  (NON AFRICAN AMERICAN): >60 ML/MIN/1.73 M^2
GLUCOSE SERPL-MCNC: 116 MG/DL (ref 70–110)
HCT VFR BLD AUTO: 36.6 % (ref 37–48.5)
HGB BLD-MCNC: 11.9 G/DL (ref 12–16)
IMM GRANULOCYTES # BLD AUTO: 0.04 K/UL (ref 0–0.04)
IMM GRANULOCYTES NFR BLD AUTO: 0.8 % (ref 0–0.5)
LACTATE SERPL-SCNC: 1.5 MMOL/L (ref 0.5–2.2)
LYMPHOCYTES # BLD AUTO: 1.3 K/UL (ref 1–4.8)
LYMPHOCYTES NFR BLD: 24.6 % (ref 18–48)
MCH RBC QN AUTO: 29.2 PG (ref 27–31)
MCHC RBC AUTO-ENTMCNC: 32.5 G/DL (ref 32–36)
MCV RBC AUTO: 90 FL (ref 82–98)
MONOCYTES # BLD AUTO: 0.6 K/UL (ref 0.3–1)
MONOCYTES NFR BLD: 11.2 % (ref 4–15)
NEUTROPHILS # BLD AUTO: 2.9 K/UL (ref 1.8–7.7)
NEUTROPHILS NFR BLD: 56.7 % (ref 38–73)
NRBC BLD-RTO: 0 /100 WBC
PLATELET # BLD AUTO: 304 K/UL (ref 150–450)
PMV BLD AUTO: 10.5 FL (ref 9.2–12.9)
POTASSIUM SERPL-SCNC: 3.4 MMOL/L (ref 3.5–5.1)
PROCALCITONIN SERPL IA-MCNC: 0.03 NG/ML
PROT SERPL-MCNC: 7.1 G/DL (ref 6–8.4)
RBC # BLD AUTO: 4.08 M/UL (ref 4–5.4)
SODIUM SERPL-SCNC: 141 MMOL/L (ref 136–145)
WBC # BLD AUTO: 5.09 K/UL (ref 3.9–12.7)

## 2022-05-30 PROCEDURE — 85025 COMPLETE CBC W/AUTO DIFF WBC: CPT | Performed by: EMERGENCY MEDICINE

## 2022-05-30 PROCEDURE — 96375 TX/PRO/DX INJ NEW DRUG ADDON: CPT

## 2022-05-30 PROCEDURE — 80053 COMPREHEN METABOLIC PANEL: CPT | Performed by: EMERGENCY MEDICINE

## 2022-05-30 PROCEDURE — 83605 ASSAY OF LACTIC ACID: CPT | Performed by: EMERGENCY MEDICINE

## 2022-05-30 PROCEDURE — 84145 PROCALCITONIN (PCT): CPT | Performed by: EMERGENCY MEDICINE

## 2022-05-30 PROCEDURE — 96374 THER/PROPH/DIAG INJ IV PUSH: CPT

## 2022-05-30 PROCEDURE — 63600175 PHARM REV CODE 636 W HCPCS: Performed by: EMERGENCY MEDICINE

## 2022-05-30 PROCEDURE — 86140 C-REACTIVE PROTEIN: CPT | Performed by: EMERGENCY MEDICINE

## 2022-05-30 PROCEDURE — 85652 RBC SED RATE AUTOMATED: CPT | Performed by: EMERGENCY MEDICINE

## 2022-05-30 PROCEDURE — 99284 EMERGENCY DEPT VISIT MOD MDM: CPT | Mod: 25

## 2022-05-30 RX ORDER — DEXAMETHASONE SODIUM PHOSPHATE 4 MG/ML
8 INJECTION, SOLUTION INTRA-ARTICULAR; INTRALESIONAL; INTRAMUSCULAR; INTRAVENOUS; SOFT TISSUE
Status: COMPLETED | OUTPATIENT
Start: 2022-05-30 | End: 2022-05-30

## 2022-05-30 RX ORDER — DIPHENHYDRAMINE HYDROCHLORIDE 50 MG/ML
25 INJECTION INTRAMUSCULAR; INTRAVENOUS
Status: COMPLETED | OUTPATIENT
Start: 2022-05-30 | End: 2022-05-30

## 2022-05-30 RX ADMIN — DIPHENHYDRAMINE HYDROCHLORIDE 25 MG: 50 INJECTION INTRAMUSCULAR; INTRAVENOUS at 11:05

## 2022-05-30 RX ADMIN — DEXAMETHASONE SODIUM PHOSPHATE 8 MG: 4 INJECTION, SOLUTION INTRA-ARTICULAR; INTRALESIONAL; INTRAMUSCULAR; INTRAVENOUS; SOFT TISSUE at 11:05

## 2022-05-30 NOTE — ED PROVIDER NOTES
Encounter Date: 2022       History     Chief Complaint   Patient presents with    Urticaria     Pt c/o itching and hives. Onset was last Wednesday and was seen here on Thursday. Symptoms has continued. Pt denies any shortness of breath or difficulty breathing.      62-year-old female history of diabetes hyperlipidemia hypertension presents with a persistent urticarial rash that started over a week ago.  Patient was seen in the emergency room given Benadryl which improved some of the symptoms the symptoms have since returned of importance patient is status post open reduction and internal fixation of a closed proximal humeral fracture on .  Patient has followed up with Dr. Galaviz she does states that that arm seems to be more swollen than the left .  She denies any fevers sweats or chills nausea vomiting diarrhea constipation and no significant pain when moving the joint.        Review of patient's allergies indicates:  No Known Allergies  Past Medical History:   Diagnosis Date    Depression     Diabetes mellitus     Hyperlipidemia     Hypertension     Osteopenia      Past Surgical History:   Procedure Laterality Date     SECTION      CHOLECYSTECTOMY      Laparoscopic    COLONOSCOPY N/A 2022    Procedure: COLONOSCOPY;  Surgeon: Sebastián Vizcarra MD;  Location: [facility] ENDO;  Service: Endoscopy;  Laterality: N/A;    KNEE ARTHROPLASTY Left 2021    Procedure: ARTHROPLASTY, KNEE;  Surgeon: Jovi Valdovinos MD;  Location: [facility] OR;  Service: Orthopedics;  Laterality: Left;  Depuy notified 21 CC  Elías bravo/ Jomar confirmed 21 MN    OPEN REDUCTION AND INTERNAL FIXATION (ORIF) OF FRACTURE OF PROXIMAL HUMERUS Right 2022    Procedure: ORIF, FRACTURE, HUMERUS, PROXIMAL;  Surgeon: Dwain Galaviz MD;  Location: [facility] OR;  Service: Orthopedics;  Laterality: Right;  Synthes proximal humerus plates, beach chair position, open shoulder retractor set  Adonay confirmed 22 AM      Family History   Problem Relation Age of Onset    Uterine cancer Mother      Social History     Tobacco Use    Smoking status: Never Smoker    Smokeless tobacco: Never Used   Substance Use Topics    Alcohol use: No    Drug use: No     Review of Systems   Skin: Positive for rash.   All other systems reviewed and are negative.      Physical Exam     Initial Vitals [05/30/22 0934]   BP Pulse Resp Temp SpO2   131/68 85 18 98.1 °F (36.7 °C) 100 %      MAP       --         Physical Exam    Nursing note and vitals reviewed.  Constitutional: She appears well-developed and well-nourished.   HENT:   Head: Normocephalic and atraumatic.   Eyes: Conjunctivae are normal. Pupils are equal, round, and reactive to light.   Neck: Neck supple.   Normal range of motion.  Cardiovascular: Normal rate and regular rhythm.   Pulmonary/Chest: Breath sounds normal.   Abdominal: Bowel sounds are normal.   Musculoskeletal:      Cervical back: Normal range of motion and neck supple.      Comments: Patient does have mobility to the right upper extremity the right seems to be slightly more pronounced than the left with some erythema to it and some calor patient's incision is clean dry there is an area of dehiscence in the upper part of the incision with some purulent drainage.     Neurological: She is alert and oriented to person, place, and time.   Skin: Rash noted.   She does have diffuse rash upper extremity torso and back.  No distinct hives seen         ED Course   Procedures  Labs Reviewed   CBC W/ AUTO DIFFERENTIAL - Abnormal; Notable for the following components:       Result Value    Hemoglobin 11.9 (*)     Hematocrit 36.6 (*)     Immature Granulocytes 0.8 (*)     All other components within normal limits   COMPREHENSIVE METABOLIC PANEL - Abnormal; Notable for the following components:    Potassium 3.4 (*)     Glucose 116 (*)     Albumin 3.3 (*)     All other components within normal limits   SEDIMENTATION RATE - Abnormal;  Notable for the following components:    Sed Rate 62 (*)     All other components within normal limits   C-REACTIVE PROTEIN - Abnormal; Notable for the following components:    CRP 27.3 (*)     All other components within normal limits   LACTIC ACID, PLASMA   PROCALCITONIN          Imaging Results          X-Ray Shoulder Trauma 3 view Right (In process)                X-Ray Humerus 2 View Right (Final result)  Result time 05/30/22 12:02:12    Final result by Mckay Lala MD (05/30/22 12:02:12)                 Impression:      1. Status post plate and screw fixation of proximal humeral fracture.  Allowing for differences in positioning, alignment appears similar to immediate postsurgical fluoroscopic exam however exclusion of interval fracture would be difficult as several fracture fragments remain about the fracture plane.  Correlation is advised.      Electronically signed by: Mckay Lala MD  Date:    05/30/2022  Time:    12:02             Narrative:    EXAMINATION:  XR HUMERUS 2 VIEW RIGHT    CLINICAL HISTORY:  Pain, unspecified    COMPARISON:  04/22/2022    FINDINGS:  Two views right humerus.    Since the previous exam, plate and screw fixation has been performed of the proximal humerus.  The humeral head maintains appropriate relationship with the glenoid.  In comparison to postsurgical intraoperative fluoroscopic exam 04/28/2022, alignment appears grossly similar although senses in positioning limit evaluation.  There are a few scattered fracture fragments about the humeral head.  No acute displaced rib fracture.  The lung zones are clear.  The elbow appears intact.                                 Medications   diphenhydrAMINE injection 25 mg (25 mg Intravenous Given 5/30/22 1115)   dexamethasone injection 8 mg (8 mg Intravenous Given 5/30/22 1108)                Attending Attestation:             Attending ED Notes:   No antibiotics for now per ortho        ED Course as of 05/30/22 1248   Mon May 30,  2022 1246 Discussed the case with ortho resident covering for Dr. Galaviz and they will see her tomorrow morning at the clinic to assess the wound the wound does not seem to be intra-articular more likely a stitch subcutaneous abscess continue Benadryl discuss this with the patient endorses understanding and agreeable [CG]      ED Course User Index  [CG] Randy Mendosa MD             Clinical Impression:   Final diagnoses:  [R52] Pain  [R21] Rash          ED Disposition Condition    Discharge Stable        ED Prescriptions     None        Follow-up Information     Follow up With Specialties Details Why Contact Info    Dwain Galaviz MD Orthopedic Surgery In 1 day  [address]  [address]  [facility] [address]  [phone]             Randy Mendosa MD  05/30/22 4518

## 2022-05-30 NOTE — FIRST PROVIDER EVALUATION
Emergency Department TeleTriage Encounter Note      CHIEF COMPLAINT    Chief Complaint   Patient presents with    Urticaria     Pt c/o itching and hives. Onset was last Wednesday and was seen here on Thursday. Symptoms has continued. Pt denies any shortness of breath or difficulty breathing.        VITAL SIGNS   Initial Vitals [05/30/22 0934]   BP Pulse Resp Temp SpO2   131/68 85 18 98.1 °F (36.7 °C) 100 %      MAP       --            ALLERGIES    Review of patient's allergies indicates:  No Known Allergies    PROVIDER TRIAGE NOTE  This is a teletriage evaluation of a 62 y.o. female presenting to the ED complaining of hives. Patient seen here last week for same complaint. No improvement since stopping tramadol.     Initial orders will be placed and care will be transferred to an alternate provider when patient is roomed for a full evaluation. Any additional orders and the final disposition will be determined by that provider.           ORDERS  Labs Reviewed - No data to display    ED Orders (720h ago, onward)    None            Virtual Visit Note: The provider triage portion of this emergency department evaluation and documentation was performed via [employer], a HIPAA-compliant telemedicine application, in concert with a tele-presenter in the room. A face to face patient evaluation with one of my colleagues will occur once the patient is placed in an emergency department room.      DISCLAIMER: This note was prepared with [employer] voice recognition transcription software. Garbled syntax, mangled pronouns, and other bizarre constructions may be attributed to that software system.

## 2022-05-30 NOTE — PLAN OF CARE
[facility] ED Phone Consult Note     Orthopedics was called to discuss the patient's care. I have thoroughly discussed this patient with the ED physician.    Per the ED Staff this is 62 y.o. female status post ORIF right proximal humerus date of surgery 04/28/2022.  Patient had uncomplicated postoperative course.  She had what was believed to be worsening nausea and itching secondary to the narcotic medication.  She was transitioned off this to Tylenol.  The patient continued to complain of hives and generalized itchiness.  She had a small suture abscess in the midportion of her incision.  She messaged our staff today in regards to the same complaint of hives and itching.    Discussion with the ED staff demonstrates no obvious signs of infection at the surgical incision.  It is clean dry intact.  No erythema.  She has a small about a eraser head sized area of superficial dehiscence with a scant amount of seropurulent drainage.  Nothing deep.  She reports that the arm is a little bit more swollen than the other side.  She was seen in the emergency department 5/26 with repeat radiographs and a DVT ultrasound the upper extremity that had no DVT findings.  She is complaining of a itchy rash on her upper extremity and torso.     Radiology:  X-rays of the humerus demonstrate intact hardware.  No signs of interval displacement.  Shoulder series ordered    CRP 27.3  Sed rate in process  Lactic acid within normal limits  Hypoalbuminemia 3.3  H and H 11.9/36  WBC 5    Assessment/Plan:     62 y.o. female status post ORIF proximal humerus right 04/28/2022 with continued complaint of generalized itchiness and hives, mid incision superficial suture abscess    No acute recommendations from Orthopedics.  We will see her in clinic tomorrow for further assessment.  No antibiotics at this point time  Continue local wound care on her suture abscess  Benadryl as needed for her urticaria and generalized itching  DVT upper extremity  5/26 negative  Instructed the patient showed to clinic tomorrow at any point time.  Will message us schedulers to contact her as well.  Shoulder series right upper extremity today before leaving the ED      Weight Bearing status:  Less than 5 lb right upper extremity      Dawson Bryant MD   [facility], PGY-5          I have reviewed the notes, assessments, and/or procedures performed by Dr. Bryant, I concur with her/his documentation of Etta Irwin.

## 2022-05-31 ENCOUNTER — PATIENT MESSAGE (OUTPATIENT)
Dept: DERMATOLOGY | Facility: CLINIC | Age: 62
End: 2022-05-31
Payer: COMMERCIAL

## 2022-05-31 ENCOUNTER — PATIENT MESSAGE (OUTPATIENT)
Dept: ORTHOPEDICS | Facility: CLINIC | Age: 62
End: 2022-05-31

## 2022-05-31 ENCOUNTER — TELEPHONE (OUTPATIENT)
Dept: DERMATOLOGY | Facility: CLINIC | Age: 62
End: 2022-05-31
Payer: COMMERCIAL

## 2022-05-31 ENCOUNTER — HOSPITAL ENCOUNTER (OUTPATIENT)
Dept: RADIOLOGY | Facility: HOSPITAL | Age: 62
Discharge: HOME | End: 2022-05-31
Attending: ORTHOPAEDIC SURGERY
Payer: COMMERCIAL

## 2022-05-31 ENCOUNTER — OFFICE VISIT (OUTPATIENT)
Dept: ORTHOPEDICS | Facility: CLINIC | Age: 62
End: 2022-05-31
Payer: COMMERCIAL

## 2022-05-31 ENCOUNTER — PATIENT OUTREACH (OUTPATIENT)
Dept: EMERGENCY MEDICINE | Facility: HOSPITAL | Age: 62
End: 2022-05-31
Payer: COMMERCIAL

## 2022-05-31 VITALS
WEIGHT: 189.63 LBS | SYSTOLIC BLOOD PRESSURE: 130 MMHG | BODY MASS INDEX: 37.23 KG/M2 | DIASTOLIC BLOOD PRESSURE: 76 MMHG | HEIGHT: 60 IN | HEART RATE: 87 BPM

## 2022-05-31 DIAGNOSIS — R21 RASH: ICD-10-CM

## 2022-05-31 DIAGNOSIS — S42.201D CLOSED FRACTURE OF PROXIMAL END OF RIGHT HUMERUS WITH ROUTINE HEALING, SUBSEQUENT ENCOUNTER: Primary | ICD-10-CM

## 2022-05-31 DIAGNOSIS — S42.201D CLOSED FRACTURE OF PROXIMAL END OF RIGHT HUMERUS WITH ROUTINE HEALING, SUBSEQUENT ENCOUNTER: ICD-10-CM

## 2022-05-31 PROCEDURE — 73030 X-RAY EXAM OF SHOULDER: CPT | Mod: 26,RT,, | Performed by: RADIOLOGY

## 2022-05-31 PROCEDURE — 4010F PR ACE/ARB THEARPY RXD/TAKEN: ICD-10-PCS | Mod: CPTII,S$GLB,, | Performed by: ORTHOPAEDIC SURGERY

## 2022-05-31 PROCEDURE — 1160F PR REVIEW ALL MEDS BY PRESCRIBER/CLIN PHARMACIST DOCUMENTED: ICD-10-PCS | Mod: CPTII,S$GLB,, | Performed by: ORTHOPAEDIC SURGERY

## 2022-05-31 PROCEDURE — 1159F MED LIST DOCD IN RCRD: CPT | Mod: CPTII,S$GLB,, | Performed by: ORTHOPAEDIC SURGERY

## 2022-05-31 PROCEDURE — 3075F SYST BP GE 130 - 139MM HG: CPT | Mod: CPTII,S$GLB,, | Performed by: ORTHOPAEDIC SURGERY

## 2022-05-31 PROCEDURE — 1160F RVW MEDS BY RX/DR IN RCRD: CPT | Mod: CPTII,S$GLB,, | Performed by: ORTHOPAEDIC SURGERY

## 2022-05-31 PROCEDURE — 99024 PR POST-OP FOLLOW-UP VISIT: ICD-10-PCS | Mod: S$GLB,,, | Performed by: ORTHOPAEDIC SURGERY

## 2022-05-31 PROCEDURE — 73030 XR SHOULDER COMPLETE 2 OR MORE VIEWS RIGHT: ICD-10-PCS | Mod: 26,RT,, | Performed by: RADIOLOGY

## 2022-05-31 PROCEDURE — 99999 PR PBB SHADOW E&M-EST. PATIENT-LVL IV: CPT | Mod: PBBFAC,,, | Performed by: ORTHOPAEDIC SURGERY

## 2022-05-31 PROCEDURE — 4010F ACE/ARB THERAPY RXD/TAKEN: CPT | Mod: CPTII,S$GLB,, | Performed by: ORTHOPAEDIC SURGERY

## 2022-05-31 PROCEDURE — 3078F DIAST BP <80 MM HG: CPT | Mod: CPTII,S$GLB,, | Performed by: ORTHOPAEDIC SURGERY

## 2022-05-31 PROCEDURE — 73030 X-RAY EXAM OF SHOULDER: CPT | Mod: TC,FY,RT

## 2022-05-31 PROCEDURE — 99999 PR PBB SHADOW E&M-EST. PATIENT-LVL IV: ICD-10-PCS | Mod: PBBFAC,,, | Performed by: ORTHOPAEDIC SURGERY

## 2022-05-31 PROCEDURE — 99024 POSTOP FOLLOW-UP VISIT: CPT | Mod: S$GLB,,, | Performed by: ORTHOPAEDIC SURGERY

## 2022-05-31 PROCEDURE — 3075F PR MOST RECENT SYSTOLIC BLOOD PRESS GE 130-139MM HG: ICD-10-PCS | Mod: CPTII,S$GLB,, | Performed by: ORTHOPAEDIC SURGERY

## 2022-05-31 PROCEDURE — 1159F PR MEDICATION LIST DOCUMENTED IN MEDICAL RECORD: ICD-10-PCS | Mod: CPTII,S$GLB,, | Performed by: ORTHOPAEDIC SURGERY

## 2022-05-31 PROCEDURE — 3008F PR BODY MASS INDEX (BMI) DOCUMENTED: ICD-10-PCS | Mod: CPTII,S$GLB,, | Performed by: ORTHOPAEDIC SURGERY

## 2022-05-31 PROCEDURE — 3008F BODY MASS INDEX DOCD: CPT | Mod: CPTII,S$GLB,, | Performed by: ORTHOPAEDIC SURGERY

## 2022-05-31 PROCEDURE — 3078F PR MOST RECENT DIASTOLIC BLOOD PRESSURE < 80 MM HG: ICD-10-PCS | Mod: CPTII,S$GLB,, | Performed by: ORTHOPAEDIC SURGERY

## 2022-05-31 RX ORDER — SULFAMETHOXAZOLE AND TRIMETHOPRIM 800; 160 MG/1; MG/1
1 TABLET ORAL 2 TIMES DAILY
Qty: 14 TABLET | Refills: 0 | Status: SHIPPED | OUTPATIENT
Start: 2022-05-31 | End: 2022-06-07

## 2022-05-31 NOTE — PROGRESS NOTES
Patient ID:   Etta Irwin is a 62 y.o. female.    Chief Complaint:   One month s/p ORIF right proximal humerus fracture    HPI:   The patient is returning for evaluation of the right shoulder. She has had a difficult time with body itching and a rash. She presented to the ER recently and was given a Benadryl injection. She has also been taking Benadryl at home. She denies any relief. Her shoulder is minimally painful. We thought perhaps she was having a reaction to pain medications but these were discontinued and she has not see any improvement. She denies any fever or chills. She has an area of her surgical incision which developed a stitch abscess.     Medications:    Current Outpatient Medications:     acetaminophen (TYLENOL) 500 MG tablet, Take 500 mg by mouth every 6 (six) hours as needed for Pain., Disp: , Rfl:     alendronate (FOSAMAX) 70 MG tablet, Take 70 mg by mouth every Thursday., Disp: , Rfl:     amitriptyline (ELAVIL) 50 MG tablet, Take 50 mg by mouth nightly., Disp: , Rfl:     amLODIPine (NORVASC) 5 MG tablet, Take 5 mg by mouth once daily., Disp: , Rfl:     aspirin 81 MG Chew, Take 1 tablet (81 mg total) by mouth once daily., Disp: , Rfl: 0    atorvastatin (LIPITOR) 40 MG tablet, Take 1 tablet by mouth every evening. , Disp: , Rfl:     diphenhydrAMINE (BENADRYL) 25 mg capsule, Take 1 capsule (25 mg total) by mouth every 6 (six) hours as needed for Itching or Allergies., Disp: 20 capsule, Rfl: 0    escitalopram oxalate (LEXAPRO) 20 MG tablet, Take 1 tablet by mouth every evening. , Disp: , Rfl:     famotidine (PEPCID) 20 MG tablet, Take 1 tablet (20 mg total) by mouth 2 (two) times daily., Disp: 20 tablet, Rfl: 0    fish oil-omega-3 fatty acids 300-1,000 mg capsule, Take 2 g by mouth once daily., Disp: , Rfl:     levothyroxine (SYNTHROID) 50 MCG tablet, Take 50 mcg by mouth before breakfast., Disp: , Rfl: 0    lisinopril (PRINIVIL,ZESTRIL) 20 MG tablet, Take 5 mg by mouth once  daily. , Disp: , Rfl:     metFORMIN (GLUCOPHAGE) 1000 MG tablet, Take 1,000 mg by mouth 2 (two) times daily with meals., Disp: , Rfl: 0    multivitamin capsule, Take 1 capsule by mouth once daily., Disp: , Rfl:     pioglitazone (ACTOS) 30 MG tablet, Take 30 mg by mouth once daily., Disp: , Rfl:     vitamin D (VITAMIN D3) 1000 units Tab, Take 1,000 Units by mouth once daily., Disp: , Rfl:     vitamin E 400 UNIT capsule, Take 400 Units by mouth once daily., Disp: , Rfl:   No current facility-administered medications for this visit.    Allergies:  Review of patient's allergies indicates:  No Known Allergies    Vitals:  LMP  (LMP Unknown)     Physical Examination:  Ortho Exam     Right shoulder exam:  The surgical incision has a small open area. No surrounding erythema. Minimal seropurulent drainage.     IMAGING STUDIES:  I have independently reviewed the following imaging studies performed at [facility]:    X-Ray Shoulder Trauma 3 view Right  Narrative: EXAMINATION:  XR SHOULDER TRAUMA 3 VIEW RIGHT    CLINICAL HISTORY:  post op;Other displaced fracture of upper end of right humerus, subsequent encounter for fracture with routine healing    TECHNIQUE:  Three or four views of the right shoulder were performed.    COMPARISON:  04/22/2022.    FINDINGS:  There are postoperative changes of the right proximal humerus with a anteriorly applied plate and multiple screws spanning a healing fracture of the surgical neck.  Alignment is normal.  There are degenerative changes of acromioclavicular joint.  The remaining visualized osseous structures are unremarkable.  Impression: Postop changes of the right proximal humerus as above.    Electronically signed by: Dmitriy Morgan  Date:    05/30/2022  Time:    13:01  X-Ray Humerus 2 View Right  Narrative: EXAMINATION:  XR HUMERUS 2 VIEW RIGHT    CLINICAL HISTORY:  Pain, unspecified    COMPARISON:  04/22/2022    FINDINGS:  Two views right humerus.    Since the previous exam, plate and  screw fixation has been performed of the proximal humerus.  The humeral head maintains appropriate relationship with the glenoid.  In comparison to postsurgical intraoperative fluoroscopic exam 04/28/2022, alignment appears grossly similar although senses in positioning limit evaluation.  There are a few scattered fracture fragments about the humeral head.  No acute displaced rib fracture.  The lung zones are clear.  The elbow appears intact.  Impression: 1. Status post plate and screw fixation of proximal humeral fracture.  Allowing for differences in positioning, alignment appears similar to immediate postsurgical fluoroscopic exam however exclusion of interval fracture would be difficult as several fracture fragments remain about the fracture plane.  Correlation is advised.    Electronically signed by: Mckay Lala MD  Date:    05/30/2022  Time:    12:02    Assessment:  1. Closed fracture of proximal end of right humerus with routine healing, subsequent encounter      Plan:  She is troubled by the presence of the rash. I have recommended dermatology referral. For the region of wound dehiscence, she will start wet to dry dressing changes and antibiotics. Bactrim DS prescribed for 7 days. I would like to have her follow-up for a wound check in one week.        No follow-ups on file.

## 2022-05-31 NOTE — TELEPHONE ENCOUNTER
Returned pt call. She informed me that she needed an appointment for hives. unfortunately  Informed her that she need to schedule with allergy [phone]. We don't see hives in Dermatology. Pt verbalized understanding but also let me know a referral was placed.         Ashlee       ----- Message from Gill Strauss, Patient Care Assistant sent at 5/31/2022 12:57 PM CDT -----  Regarding: appt  Contact: Pt  Pt is requesting a call back in regards to scheduling an appt. Pt is requesting call back before scheduling a date. Pt would like to confirm date and time when scheduling. No availability on my end to schedule for pt. Pt states referring provider states she needs to be seen in as soon as possible.        Pt @ [phone]

## 2022-06-02 ENCOUNTER — PATIENT MESSAGE (OUTPATIENT)
Dept: DERMATOLOGY | Facility: CLINIC | Age: 62
End: 2022-06-02
Payer: COMMERCIAL

## 2022-06-08 ENCOUNTER — OFFICE VISIT (OUTPATIENT)
Dept: ORTHOPEDICS | Facility: CLINIC | Age: 62
End: 2022-06-08
Payer: COMMERCIAL

## 2022-06-08 VITALS
SYSTOLIC BLOOD PRESSURE: 112 MMHG | HEIGHT: 60 IN | DIASTOLIC BLOOD PRESSURE: 70 MMHG | BODY MASS INDEX: 37.05 KG/M2 | HEART RATE: 97 BPM | WEIGHT: 188.69 LBS

## 2022-06-08 DIAGNOSIS — S42.201D CLOSED FRACTURE OF PROXIMAL END OF RIGHT HUMERUS WITH ROUTINE HEALING, SUBSEQUENT ENCOUNTER: Primary | ICD-10-CM

## 2022-06-08 PROCEDURE — 3078F DIAST BP <80 MM HG: CPT | Mod: CPTII,S$GLB,, | Performed by: ORTHOPAEDIC SURGERY

## 2022-06-08 PROCEDURE — 4010F PR ACE/ARB THEARPY RXD/TAKEN: ICD-10-PCS | Mod: CPTII,S$GLB,, | Performed by: ORTHOPAEDIC SURGERY

## 2022-06-08 PROCEDURE — 1160F RVW MEDS BY RX/DR IN RCRD: CPT | Mod: CPTII,S$GLB,, | Performed by: ORTHOPAEDIC SURGERY

## 2022-06-08 PROCEDURE — 99024 POSTOP FOLLOW-UP VISIT: CPT | Mod: S$GLB,,, | Performed by: ORTHOPAEDIC SURGERY

## 2022-06-08 PROCEDURE — 99999 PR PBB SHADOW E&M-EST. PATIENT-LVL III: CPT | Mod: PBBFAC,,, | Performed by: ORTHOPAEDIC SURGERY

## 2022-06-08 PROCEDURE — 99999 PR PBB SHADOW E&M-EST. PATIENT-LVL III: ICD-10-PCS | Mod: PBBFAC,,, | Performed by: ORTHOPAEDIC SURGERY

## 2022-06-08 PROCEDURE — 3074F PR MOST RECENT SYSTOLIC BLOOD PRESSURE < 130 MM HG: ICD-10-PCS | Mod: CPTII,S$GLB,, | Performed by: ORTHOPAEDIC SURGERY

## 2022-06-08 PROCEDURE — 1159F MED LIST DOCD IN RCRD: CPT | Mod: CPTII,S$GLB,, | Performed by: ORTHOPAEDIC SURGERY

## 2022-06-08 PROCEDURE — 3074F SYST BP LT 130 MM HG: CPT | Mod: CPTII,S$GLB,, | Performed by: ORTHOPAEDIC SURGERY

## 2022-06-08 PROCEDURE — 3078F PR MOST RECENT DIASTOLIC BLOOD PRESSURE < 80 MM HG: ICD-10-PCS | Mod: CPTII,S$GLB,, | Performed by: ORTHOPAEDIC SURGERY

## 2022-06-08 PROCEDURE — 4010F ACE/ARB THERAPY RXD/TAKEN: CPT | Mod: CPTII,S$GLB,, | Performed by: ORTHOPAEDIC SURGERY

## 2022-06-08 PROCEDURE — 1160F PR REVIEW ALL MEDS BY PRESCRIBER/CLIN PHARMACIST DOCUMENTED: ICD-10-PCS | Mod: CPTII,S$GLB,, | Performed by: ORTHOPAEDIC SURGERY

## 2022-06-08 PROCEDURE — 3008F BODY MASS INDEX DOCD: CPT | Mod: CPTII,S$GLB,, | Performed by: ORTHOPAEDIC SURGERY

## 2022-06-08 PROCEDURE — 99024 PR POST-OP FOLLOW-UP VISIT: ICD-10-PCS | Mod: S$GLB,,, | Performed by: ORTHOPAEDIC SURGERY

## 2022-06-08 PROCEDURE — 3008F PR BODY MASS INDEX (BMI) DOCUMENTED: ICD-10-PCS | Mod: CPTII,S$GLB,, | Performed by: ORTHOPAEDIC SURGERY

## 2022-06-08 PROCEDURE — 1159F PR MEDICATION LIST DOCUMENTED IN MEDICAL RECORD: ICD-10-PCS | Mod: CPTII,S$GLB,, | Performed by: ORTHOPAEDIC SURGERY

## 2022-06-09 ENCOUNTER — PATIENT OUTREACH (OUTPATIENT)
Dept: EMERGENCY MEDICINE | Facility: HOSPITAL | Age: 62
End: 2022-06-09
Payer: COMMERCIAL

## 2022-06-09 NOTE — PROGRESS NOTES
Patient ID:   Etta Irwin is a 62 y.o. female.    Chief Complaint:   Follow-up evaluation for s/p ORIF right proximal humerus     HPI:   The patient is returning. Her rash is much improved. Her wound has pretty much closed up. She denies fever, chills, or pain.     Medications:    Current Outpatient Medications:     acetaminophen (TYLENOL) 500 MG tablet, Take 500 mg by mouth every 6 (six) hours as needed for Pain., Disp: , Rfl:     alendronate (FOSAMAX) 70 MG tablet, Take 70 mg by mouth every Thursday., Disp: , Rfl:     amitriptyline (ELAVIL) 50 MG tablet, Take 50 mg by mouth nightly., Disp: , Rfl:     amLODIPine (NORVASC) 5 MG tablet, Take 5 mg by mouth once daily., Disp: , Rfl:     atorvastatin (LIPITOR) 40 MG tablet, Take 1 tablet by mouth every evening. , Disp: , Rfl:     diphenhydrAMINE (BENADRYL) 25 mg capsule, Take 1 capsule (25 mg total) by mouth every 6 (six) hours as needed for Itching or Allergies., Disp: 20 capsule, Rfl: 0    escitalopram oxalate (LEXAPRO) 20 MG tablet, Take 1 tablet by mouth every evening. , Disp: , Rfl:     famotidine (PEPCID) 20 MG tablet, Take 1 tablet (20 mg total) by mouth 2 (two) times daily., Disp: 20 tablet, Rfl: 0    fish oil-omega-3 fatty acids 300-1,000 mg capsule, Take 2 g by mouth once daily., Disp: , Rfl:     levothyroxine (SYNTHROID) 50 MCG tablet, Take 50 mcg by mouth before breakfast., Disp: , Rfl: 0    lisinopril (PRINIVIL,ZESTRIL) 20 MG tablet, Take 5 mg by mouth once daily. , Disp: , Rfl:     metFORMIN (GLUCOPHAGE) 1000 MG tablet, Take 1,000 mg by mouth 2 (two) times daily with meals., Disp: , Rfl: 0    multivitamin capsule, Take 1 capsule by mouth once daily., Disp: , Rfl:     pioglitazone (ACTOS) 30 MG tablet, Take 30 mg by mouth once daily., Disp: , Rfl:     vitamin D (VITAMIN D3) 1000 units Tab, Take 1,000 Units by mouth once daily., Disp: , Rfl:     vitamin E 400 UNIT capsule, Take 400 Units by mouth once daily., Disp: , Rfl:     aspirin  81 MG Chew, Take 1 tablet (81 mg total) by mouth once daily., Disp: , Rfl: 0    Allergies:  Review of patient's allergies indicates:  No Known Allergies    Vitals:  /70 (BP Location: Left arm, Patient Position: Sitting, BP Method: Large (Automatic))   Pulse 97   Ht 5' (1.524 m)   Wt 85.6 kg (188 lb 11.4 oz)   LMP  (LMP Unknown)   BMI 36.86 kg/m²     Physical Examination:  Ortho Exam   Right shoulder incision demonstrates a scab over the previously open region. No erythema.     Assessment:  No diagnosis found.    Plan:  Recommended continued observation of the wound. Follow-up in one month with a new x-ray of the right shoulder.        No follow-ups on file.

## 2022-06-13 ENCOUNTER — PATIENT OUTREACH (OUTPATIENT)
Dept: EMERGENCY MEDICINE | Facility: HOSPITAL | Age: 62
End: 2022-06-13
Payer: COMMERCIAL

## 2022-06-14 ENCOUNTER — PATIENT MESSAGE (OUTPATIENT)
Dept: ORTHOPEDICS | Facility: CLINIC | Age: 62
End: 2022-06-14
Payer: COMMERCIAL

## 2022-06-14 DIAGNOSIS — S42.201D CLOSED FRACTURE OF PROXIMAL END OF RIGHT HUMERUS WITH ROUTINE HEALING, SUBSEQUENT ENCOUNTER: Primary | ICD-10-CM

## 2022-06-16 ENCOUNTER — PATIENT OUTREACH (OUTPATIENT)
Dept: EMERGENCY MEDICINE | Facility: HOSPITAL | Age: 62
End: 2022-06-16
Payer: COMMERCIAL

## 2022-06-16 NOTE — PROGRESS NOTES
Spoke with patient and she stated she was doing fine. Patient was asked if she had been to see her PCP she stated she had been to see her surgeon. Patient denied needing any other assistance at this time.

## 2022-06-20 ENCOUNTER — CLINICAL SUPPORT (OUTPATIENT)
Dept: REHABILITATION | Facility: HOSPITAL | Age: 62
End: 2022-06-20
Attending: ORTHOPAEDIC SURGERY
Payer: COMMERCIAL

## 2022-06-20 DIAGNOSIS — M25.611 DECREASED RIGHT SHOULDER RANGE OF MOTION: ICD-10-CM

## 2022-06-20 DIAGNOSIS — R29.898 DECREASED STRENGTH OF UPPER EXTREMITY: ICD-10-CM

## 2022-06-20 DIAGNOSIS — S42.201D CLOSED FRACTURE OF PROXIMAL END OF RIGHT HUMERUS WITH ROUTINE HEALING, SUBSEQUENT ENCOUNTER: ICD-10-CM

## 2022-06-20 DIAGNOSIS — R29.3 POSTURE ABNORMALITY: ICD-10-CM

## 2022-06-20 PROCEDURE — 97161 PT EVAL LOW COMPLEX 20 MIN: CPT | Mod: PN

## 2022-06-20 PROCEDURE — 97110 THERAPEUTIC EXERCISES: CPT | Mod: PN

## 2022-06-27 NOTE — PROGRESS NOTES
"[facility]   Physical Therapy Treatment Note     Name: Etta Irwin  Clinic Number: [id number]    Therapy Diagnosis:   Encounter Diagnoses   Name Primary?    Decreased right shoulder range of motion Yes    Decreased strength of upper extremity     Posture abnormality      Physician: Dwain Galaviz MD    Visit Date: 6/28/2022    Physician Orders: PT Eval and Treat "Begin tonny PROM"  Medical Diagnosis from Referral: S42.201D (ICD-10-CM) - Closed fracture of proximal end of right humerus with routine healing, subsequent encounter  Evaluation Date: 6/20/2022  Authorization Period Expiration: 12/31/22  Plan of Care Expiration: 08/15/2022  Progress Note Due: 07/18/2022  Visit # / Visits authorized: 1/ 50 +1  FOTO: 1/10     Precautions: Standard     PTA Visit #: 0/5     Time In: 0800  Time Out: 0900  Total Billable Time: 60 minutes    DOS: 4/28/22 ORIF of proximal humerus     SUBJECTIVE     Pt reports: she has some soreness after last treatment but it went away after a day or two.  She was compliant with home exercise program.  Response to previous treatment: good with soreness that resolved  Functional change: able to reach higher    Pain: 0/10  Location: right shoulder     OBJECTIVE     Objective Measures updated at progress report unless specified.     Treatment     Etta received the treatments listed below:      THERAPEUTIC EXERCISES to develop strength, endurance, ROM, flexibility, posture and core stabilization for 50 minutes including:      Supine sh flex w dowel 5 sec hold x 20  Supine sh ER at 45 deg w dowel 5 sec hold x 20  R upper trap stretch w overpressure 3 x 30 sec  - seated scap retractions; 20 x w/ 5" hold  - table slides: flexion (elevated), scaption, abduction- NP, ER 5 sec hold x 20 ea  Pulleys flex x 3 min  Pulleys abd x 3 min  Rows w RTB x 20  B sh ext w RTB x 20  Post sh roll x 20  Supine pec stretch in T position x 3 min  PROM sh flex, ER, and IR x 5 " min    Manual therapy techniques for 10 minutes including:    STM to R pec and subscapularis/lats  Scar mobs    Patient Education and Home Exercises     Home Exercises Provided and Patient Education Provided     Education provided:   - diagnosis/prognosis, goals for therapy, role of therapy for care, exercises/HEP     Written Home Exercises Provided: yes. 6/28/22. Exercises were reviewed and Etta was able to demonstrate them prior to the end of the session.  Etta demonstrated good  understanding of the education provided. See EMR under Patient Instructions for exercises provided during therapy sessions    ASSESSMENT     Etta had good tolerance to treatment today with no adverse effects. Post-treatment R shoulder pain rated as 0/10. Appropriate exercise-induced fatigue achieved. Pt presents to clinic with decreased shoulder AROM in all planes and impaired shoulder mechanics with upper trap compensations. Good response to progression of exercise program. Increase in shoulder flexion, abduction, and ER PROM noted by end of session. Soft tissue dysfunction in R pec improved with manual therapy techniques. She continues to demonstrate UE weakness. Updated HEP provided. Will progress UE strength and shoulder ROM to tolerance.     Etta Is progressing well towards her goals.   Pt prognosis is Good.     Pt will continue to benefit from skilled outpatient physical therapy to address the deficits listed in the problem list box on initial evaluation, provide pt/family education and to maximize pt's level of independence in the home and community environment.     Pt's spiritual, cultural and educational needs considered and pt agreeable to plan of care and goals.     Anticipated barriers to physical therapy: co-morbidities, time since sx     Goals:  Short-Term Goals: 4 weeks  - The patient will be independent with initial home exercise program.  - The patient will demonstrate good unsupported sitting posture with min verbal  cues for 15 minutes.  - The patient will increase ROM 15 degrees to perform bathing and hygiene, grooming, toileting and dressing with pain < 010.  - The patient will increase strength by 1.2 muscle grade  to perform bathing and hygiene, grooming, toileting and dressing with pain < 0/10.     Long-Term Goals: 8 weeks  - The patient will be independent with home exercise program and symptom management.  - The patient will increase ROM = to uninvolved shoulder  to perform work duties and recreation/leisure activities   with pain < 0/10.  - The patient will increase strength = to uninvolved shoulder  to perform work duties and recreation/leisure activities   with pain < 0/10.    PLAN     Plan of care Certification: 6/20/2022 to 07/15/2022.     Outpatient Physical Therapy 2 times weekly for 8 weeks to include the following interventions: Aquatic Therapy, Manual Therapy, Moist Heat/ Ice, Neuromuscular Re-ed, Patient Education, Therapeutic Activities and Therapeutic Exercise.     Progress shoulder ROM and UE strengthening.    Nadine Jay, PT

## 2022-06-28 ENCOUNTER — CLINICAL SUPPORT (OUTPATIENT)
Dept: REHABILITATION | Facility: HOSPITAL | Age: 62
End: 2022-06-28
Payer: COMMERCIAL

## 2022-06-28 DIAGNOSIS — M25.611 DECREASED RIGHT SHOULDER RANGE OF MOTION: Primary | ICD-10-CM

## 2022-06-28 DIAGNOSIS — R29.3 POSTURE ABNORMALITY: ICD-10-CM

## 2022-06-28 DIAGNOSIS — R29.898 DECREASED STRENGTH OF UPPER EXTREMITY: ICD-10-CM

## 2022-06-28 PROCEDURE — 97110 THERAPEUTIC EXERCISES: CPT | Mod: PN

## 2022-06-28 PROCEDURE — 97140 MANUAL THERAPY 1/> REGIONS: CPT | Mod: PN

## 2022-06-30 ENCOUNTER — OFFICE VISIT (OUTPATIENT)
Dept: ORTHOPEDICS | Facility: CLINIC | Age: 62
End: 2022-06-30
Payer: COMMERCIAL

## 2022-06-30 VITALS — BODY MASS INDEX: 37.05 KG/M2 | HEIGHT: 60 IN | WEIGHT: 188.69 LBS

## 2022-06-30 DIAGNOSIS — Z96.652 STATUS POST TOTAL LEFT KNEE REPLACEMENT: ICD-10-CM

## 2022-06-30 DIAGNOSIS — M17.12 PRIMARY OSTEOARTHRITIS OF LEFT KNEE: Primary | ICD-10-CM

## 2022-06-30 PROCEDURE — 1160F RVW MEDS BY RX/DR IN RCRD: CPT | Mod: CPTII,S$GLB,, | Performed by: ORTHOPAEDIC SURGERY

## 2022-06-30 PROCEDURE — 4010F ACE/ARB THERAPY RXD/TAKEN: CPT | Mod: CPTII,S$GLB,, | Performed by: ORTHOPAEDIC SURGERY

## 2022-06-30 PROCEDURE — 99999 PR PBB SHADOW E&M-EST. PATIENT-LVL III: CPT | Mod: PBBFAC,,, | Performed by: ORTHOPAEDIC SURGERY

## 2022-06-30 PROCEDURE — 99213 OFFICE O/P EST LOW 20 MIN: CPT | Mod: 24,S$GLB,, | Performed by: ORTHOPAEDIC SURGERY

## 2022-06-30 PROCEDURE — 99999 PR PBB SHADOW E&M-EST. PATIENT-LVL III: ICD-10-PCS | Mod: PBBFAC,,, | Performed by: ORTHOPAEDIC SURGERY

## 2022-06-30 PROCEDURE — 99213 PR OFFICE/OUTPT VISIT, EST, LEVL III, 20-29 MIN: ICD-10-PCS | Mod: 24,S$GLB,, | Performed by: ORTHOPAEDIC SURGERY

## 2022-06-30 PROCEDURE — 1160F PR REVIEW ALL MEDS BY PRESCRIBER/CLIN PHARMACIST DOCUMENTED: ICD-10-PCS | Mod: CPTII,S$GLB,, | Performed by: ORTHOPAEDIC SURGERY

## 2022-06-30 PROCEDURE — 3008F BODY MASS INDEX DOCD: CPT | Mod: CPTII,S$GLB,, | Performed by: ORTHOPAEDIC SURGERY

## 2022-06-30 PROCEDURE — 1159F MED LIST DOCD IN RCRD: CPT | Mod: CPTII,S$GLB,, | Performed by: ORTHOPAEDIC SURGERY

## 2022-06-30 PROCEDURE — 4010F PR ACE/ARB THEARPY RXD/TAKEN: ICD-10-PCS | Mod: CPTII,S$GLB,, | Performed by: ORTHOPAEDIC SURGERY

## 2022-06-30 PROCEDURE — 1159F PR MEDICATION LIST DOCUMENTED IN MEDICAL RECORD: ICD-10-PCS | Mod: CPTII,S$GLB,, | Performed by: ORTHOPAEDIC SURGERY

## 2022-06-30 PROCEDURE — 3008F PR BODY MASS INDEX (BMI) DOCUMENTED: ICD-10-PCS | Mod: CPTII,S$GLB,, | Performed by: ORTHOPAEDIC SURGERY

## 2022-06-30 NOTE — PROGRESS NOTES
Subjective:      Patient ID: Etta Irwin is a 62 y.o. female.    Chief Complaint: Knee Pain (left )      HPI:   approximately 14 months postop  The patient is seen for postop follow-up of left  TKA.  Pain control has been satisfactory  They feel that they are ambulating easily  Postoperative complaints include:  Patient had a recent fall with significant shoulder injury.  She has had some intermittent knee pain ever since, generally improving lately.  She is concerned she may have injured Aruna knee when she fell.      Current Outpatient Medications:     alendronate (FOSAMAX) 70 MG tablet, Take 70 mg by mouth every Thursday., Disp: , Rfl:     amitriptyline (ELAVIL) 50 MG tablet, Take 50 mg by mouth nightly., Disp: , Rfl:     amLODIPine (NORVASC) 5 MG tablet, Take 5 mg by mouth once daily., Disp: , Rfl:     atorvastatin (LIPITOR) 40 MG tablet, Take 1 tablet by mouth every evening. , Disp: , Rfl:     escitalopram oxalate (LEXAPRO) 20 MG tablet, Take 1 tablet by mouth every evening. , Disp: , Rfl:     famotidine (PEPCID) 20 MG tablet, Take 1 tablet (20 mg total) by mouth 2 (two) times daily., Disp: 20 tablet, Rfl: 0    fish oil-omega-3 fatty acids 300-1,000 mg capsule, Take 2 g by mouth once daily., Disp: , Rfl:     levothyroxine (SYNTHROID) 50 MCG tablet, Take 50 mcg by mouth before breakfast., Disp: , Rfl: 0    lisinopril (PRINIVIL,ZESTRIL) 20 MG tablet, Take 5 mg by mouth once daily. , Disp: , Rfl:     metFORMIN (GLUCOPHAGE) 1000 MG tablet, Take 1,000 mg by mouth 2 (two) times daily with meals., Disp: , Rfl: 0    multivitamin capsule, Take 1 capsule by mouth once daily., Disp: , Rfl:     pioglitazone (ACTOS) 30 MG tablet, Take 30 mg by mouth once daily., Disp: , Rfl:     vitamin D (VITAMIN D3) 1000 units Tab, Take 1,000 Units by mouth once daily., Disp: , Rfl:     vitamin E 400 UNIT capsule, Take 400 Units by mouth once daily., Disp: , Rfl:     acetaminophen (TYLENOL) 500 MG tablet, Take 500  mg by mouth every 6 (six) hours as needed for Pain., Disp: , Rfl:     aspirin 81 MG Chew, Take 1 tablet (81 mg total) by mouth once daily., Disp: , Rfl: 0    diphenhydrAMINE (BENADRYL) 25 mg capsule, Take 1 capsule (25 mg total) by mouth every 6 (six) hours as needed for Itching or Allergies. (Patient not taking: Reported on 6/30/2022), Disp: 20 capsule, Rfl: 0  Review of patient's allergies indicates:  No Known Allergies    Ht 5' (1.524 m)   Wt 85.6 kg (188 lb 11.4 oz)   LMP  (LMP Unknown)   BMI 36.86 kg/m²     Review of Systems   Constitutional: Negative for fever and weight loss.   HENT: Negative for congestion.    Eyes: Negative for visual disturbance.   Cardiovascular: Negative for chest pain.   Respiratory: Negative for shortness of breath.    Hematologic/Lymphatic: Negative for bleeding problem. Does not bruise/bleed easily.   Skin: Negative for poor wound healing.   Gastrointestinal: Negative for abdominal pain.   Genitourinary: Negative for dysuria.   Neurological: Negative for seizures.   Psychiatric/Behavioral: Negative for altered mental status.   Allergic/Immunologic: Negative for persistent infections.           Objective:    Ortho Exam          Alert, oriented, no acute distress  Sclera-Normal  Respiratory distress-none  Gait   No limp    nontender  Incision:  Normally healed  Range of motion:  0-135  Valgus/varus stability- stable  Swelling-none  Distal perfusion- intact  Distal neurologic- intact    Imaging:  Radiographs show a well-positioned left total knee replacement without periprosthetic fracture or evidence of loosening.    Assessment:             1. Primary osteoarthritis of left knee    2. Status post total left knee replacement          The implant is functioning normally.  There is no objective sign of any injury to the prosthetic or surrounding bone.        Plan:          No follow-ups on file.      I explained my impression reassured the patient that there was no evidence of  structural damage.      Appropriate levels of activity were discussed

## 2022-06-30 NOTE — PROGRESS NOTES
"[facility]   Physical Therapy Treatment Note     Name: Etta Roy  Clinic Number: [id number]    Therapy Diagnosis:   Encounter Diagnoses   Name Primary?    Decreased right shoulder range of motion Yes    Decreased strength of upper extremity     Posture abnormality      Physician: Dwain Galaviz MD    Visit Date: 7/5/2022    Physician Orders: PT Eval and Treat "Begin tonny PROM"  Medical Diagnosis from Referral: S42.201D (ICD-10-CM) - Closed fracture of proximal end of right humerus with routine healing, subsequent encounter  Evaluation Date: 6/20/2022  Authorization Period Expiration: 12/31/22  Plan of Care Expiration: 08/15/2022  Progress Note Due: 07/18/2022  Visit # / Visits authorized: 2/ 50 +1  FOTO: 1/10     Precautions: Standard     PTA Visit #: 0/5     Time In: 0900  Time Out: 0958  Total Billable Time: 58 minutes    DOS: 4/28/22 ORIF of proximal humerus     SUBJECTIVE     Pt reports: her arm has been feeling really good. Still has difficulty reaching up but it is getting better.   She was compliant with home exercise program.  Response to previous treatment: good with soreness that resolved  Functional change: none to note    Pain: 0/10  Location: right shoulder     OBJECTIVE     Objective Measures updated at progress report unless specified.     Treatment     Etta received the treatments listed below:     THERAPEUTIC EXERCISES to develop strength, endurance, ROM, flexibility, posture and core stabilization for 48 minutes including:      +Supine chest press w yellow dowel x 20  Supine sh flex w yellow dowel 5 sec hold x 20  Supine sh ER at 45 deg w dowel 5 sec hold x 20  R upper trap stretch w overpressure 3 x 30 sec  - seated scap retractions; 20 x w/ 5" hold  Table slides: flexion (elevated), scaption, abduction, ER 5 sec hold x 20 ea  +Post cap stretch across chest 3 x 30 sec  Pulleys flex x 3 min  Pulleys abd x 3 min  Rows w RTB x 20  B sh ext w RTB x " 20  Post sh roll x 20  Supine pec stretch in T position x 3 min  PROM sh flex, ER, and IR x 5 min    Manual therapy techniques for 10 minutes including:    STM to R pec and subscapularis/lats  Scar mobs  Grade 3 post and ing R GH joint mob    Patient Education and Home Exercises     Home Exercises Provided and Patient Education Provided     Education provided:   - diagnosis/prognosis, goals for therapy, role of therapy for care, exercises/HEP     Written Home Exercises Provided: Patient instructed to cont prior HEP. 6/28/22. Exercises were reviewed and Etta was able to demonstrate them prior to the end of the session.  Etta demonstrated good  understanding of the education provided. See EMR under Patient Instructions for exercises provided during therapy sessions    ASSESSMENT     Etta had good tolerance to treatment today with no adverse effects. Post-treatment R shoulder pain rated as 0/10. Pt presents to clinic with improvements in shoulder AROM and PROM but continues with upper trap compensatory shoulder hike. Increased range noted with supine dowel AAROM exercises. Tightness reported with posterior capsule stretching. She continues with UE weakness and poor scapulohumeral rhythm. Will progress UE strength and shoulder ROM to tolerance.     Etta Is progressing well towards her goals.   Pt prognosis is Good.     Pt will continue to benefit from skilled outpatient physical therapy to address the deficits listed in the problem list box on initial evaluation, provide pt/family education and to maximize pt's level of independence in the home and community environment.     Pt's spiritual, cultural and educational needs considered and pt agreeable to plan of care and goals.     Anticipated barriers to physical therapy: co-morbidities, time since sx     Goals:  Short-Term Goals: 4 weeks  - The patient will be independent with initial home exercise program.  - The patient will demonstrate good unsupported sitting  posture with min verbal cues for 15 minutes.  - The patient will increase ROM 15 degrees to perform bathing and hygiene, grooming, toileting and dressing with pain < 010.  - The patient will increase strength by 1.2 muscle grade  to perform bathing and hygiene, grooming, toileting and dressing with pain < 0/10.     Long-Term Goals: 8 weeks  - The patient will be independent with home exercise program and symptom management.  - The patient will increase ROM = to uninvolved shoulder  to perform work duties and recreation/leisure activities   with pain < 0/10.  - The patient will increase strength = to uninvolved shoulder  to perform work duties and recreation/leisure activities   with pain < 0/10.    PLAN     Plan of care Certification: 6/20/2022 to 07/15/2022.     Outpatient Physical Therapy 2 times weekly for 8 weeks to include the following interventions: Aquatic Therapy, Manual Therapy, Moist Heat/ Ice, Neuromuscular Re-ed, Patient Education, Therapeutic Activities and Therapeutic Exercise.     Progress shoulder ROM and UE strengthening.    Nadine Jay, PT

## 2022-07-05 ENCOUNTER — CLINICAL SUPPORT (OUTPATIENT)
Dept: REHABILITATION | Facility: HOSPITAL | Age: 62
End: 2022-07-05
Payer: COMMERCIAL

## 2022-07-05 DIAGNOSIS — R29.898 DECREASED STRENGTH OF UPPER EXTREMITY: ICD-10-CM

## 2022-07-05 DIAGNOSIS — R29.3 POSTURE ABNORMALITY: ICD-10-CM

## 2022-07-05 DIAGNOSIS — M25.611 DECREASED RIGHT SHOULDER RANGE OF MOTION: Primary | ICD-10-CM

## 2022-07-05 PROCEDURE — 97140 MANUAL THERAPY 1/> REGIONS: CPT | Mod: PN

## 2022-07-05 PROCEDURE — 97110 THERAPEUTIC EXERCISES: CPT | Mod: PN

## 2022-07-06 ENCOUNTER — OFFICE VISIT (OUTPATIENT)
Dept: ORTHOPEDICS | Facility: CLINIC | Age: 62
End: 2022-07-06
Payer: COMMERCIAL

## 2022-07-06 ENCOUNTER — HOSPITAL ENCOUNTER (OUTPATIENT)
Dept: RADIOLOGY | Facility: HOSPITAL | Age: 62
Discharge: HOME | End: 2022-07-06
Attending: ORTHOPAEDIC SURGERY
Payer: COMMERCIAL

## 2022-07-06 VITALS
SYSTOLIC BLOOD PRESSURE: 119 MMHG | HEART RATE: 85 BPM | WEIGHT: 190.56 LBS | DIASTOLIC BLOOD PRESSURE: 75 MMHG | HEIGHT: 60 IN | BODY MASS INDEX: 37.41 KG/M2

## 2022-07-06 DIAGNOSIS — S42.201D CLOSED FRACTURE OF PROXIMAL END OF RIGHT HUMERUS WITH ROUTINE HEALING, SUBSEQUENT ENCOUNTER: Primary | ICD-10-CM

## 2022-07-06 DIAGNOSIS — M25.511 RIGHT SHOULDER PAIN, UNSPECIFIED CHRONICITY: Primary | ICD-10-CM

## 2022-07-06 DIAGNOSIS — M25.511 RIGHT SHOULDER PAIN, UNSPECIFIED CHRONICITY: ICD-10-CM

## 2022-07-06 PROCEDURE — 73030 X-RAY EXAM OF SHOULDER: CPT | Mod: 26,RT,, | Performed by: RADIOLOGY

## 2022-07-06 PROCEDURE — 1159F MED LIST DOCD IN RCRD: CPT | Mod: CPTII,S$GLB,, | Performed by: ORTHOPAEDIC SURGERY

## 2022-07-06 PROCEDURE — 73030 X-RAY EXAM OF SHOULDER: CPT | Mod: TC,FY,RT

## 2022-07-06 PROCEDURE — 73030 XR SHOULDER COMPLETE 2 OR MORE VIEWS RIGHT: ICD-10-PCS | Mod: 26,RT,, | Performed by: RADIOLOGY

## 2022-07-06 PROCEDURE — 4010F ACE/ARB THERAPY RXD/TAKEN: CPT | Mod: CPTII,S$GLB,, | Performed by: ORTHOPAEDIC SURGERY

## 2022-07-06 PROCEDURE — 3008F BODY MASS INDEX DOCD: CPT | Mod: CPTII,S$GLB,, | Performed by: ORTHOPAEDIC SURGERY

## 2022-07-06 PROCEDURE — 3008F PR BODY MASS INDEX (BMI) DOCUMENTED: ICD-10-PCS | Mod: CPTII,S$GLB,, | Performed by: ORTHOPAEDIC SURGERY

## 2022-07-06 PROCEDURE — 99999 PR PBB SHADOW E&M-EST. PATIENT-LVL IV: ICD-10-PCS | Mod: PBBFAC,,, | Performed by: ORTHOPAEDIC SURGERY

## 2022-07-06 PROCEDURE — 4010F PR ACE/ARB THEARPY RXD/TAKEN: ICD-10-PCS | Mod: CPTII,S$GLB,, | Performed by: ORTHOPAEDIC SURGERY

## 2022-07-06 PROCEDURE — 3078F DIAST BP <80 MM HG: CPT | Mod: CPTII,S$GLB,, | Performed by: ORTHOPAEDIC SURGERY

## 2022-07-06 PROCEDURE — 99024 PR POST-OP FOLLOW-UP VISIT: ICD-10-PCS | Mod: S$GLB,,, | Performed by: ORTHOPAEDIC SURGERY

## 2022-07-06 PROCEDURE — 3074F PR MOST RECENT SYSTOLIC BLOOD PRESSURE < 130 MM HG: ICD-10-PCS | Mod: CPTII,S$GLB,, | Performed by: ORTHOPAEDIC SURGERY

## 2022-07-06 PROCEDURE — 1160F PR REVIEW ALL MEDS BY PRESCRIBER/CLIN PHARMACIST DOCUMENTED: ICD-10-PCS | Mod: CPTII,S$GLB,, | Performed by: ORTHOPAEDIC SURGERY

## 2022-07-06 PROCEDURE — 3074F SYST BP LT 130 MM HG: CPT | Mod: CPTII,S$GLB,, | Performed by: ORTHOPAEDIC SURGERY

## 2022-07-06 PROCEDURE — 1159F PR MEDICATION LIST DOCUMENTED IN MEDICAL RECORD: ICD-10-PCS | Mod: CPTII,S$GLB,, | Performed by: ORTHOPAEDIC SURGERY

## 2022-07-06 PROCEDURE — 99999 PR PBB SHADOW E&M-EST. PATIENT-LVL IV: CPT | Mod: PBBFAC,,, | Performed by: ORTHOPAEDIC SURGERY

## 2022-07-06 PROCEDURE — 3078F PR MOST RECENT DIASTOLIC BLOOD PRESSURE < 80 MM HG: ICD-10-PCS | Mod: CPTII,S$GLB,, | Performed by: ORTHOPAEDIC SURGERY

## 2022-07-06 PROCEDURE — 99024 POSTOP FOLLOW-UP VISIT: CPT | Mod: S$GLB,,, | Performed by: ORTHOPAEDIC SURGERY

## 2022-07-06 PROCEDURE — 1160F RVW MEDS BY RX/DR IN RCRD: CPT | Mod: CPTII,S$GLB,, | Performed by: ORTHOPAEDIC SURGERY

## 2022-07-06 NOTE — PROGRESS NOTES
Patient ID:   Etta Irwin is a 62 y.o. female.    Chief Complaint:   Follow-up evaluation 2.5 months s/p ORIF R proximal humerus fracture    HPI:   The patient is returning for evaluation of her right shoulder.  She states that she is doing well.  She denies any pain.  She is currently in physical therapy.    Medications:    Current Outpatient Medications:     acetaminophen (TYLENOL) 500 MG tablet, Take 500 mg by mouth every 6 (six) hours as needed for Pain., Disp: , Rfl:     alendronate (FOSAMAX) 70 MG tablet, Take 70 mg by mouth every Thursday., Disp: , Rfl:     amitriptyline (ELAVIL) 50 MG tablet, Take 50 mg by mouth nightly., Disp: , Rfl:     amLODIPine (NORVASC) 5 MG tablet, Take 5 mg by mouth once daily., Disp: , Rfl:     aspirin 81 MG Chew, Take 1 tablet (81 mg total) by mouth once daily., Disp: , Rfl: 0    atorvastatin (LIPITOR) 40 MG tablet, Take 1 tablet by mouth every evening. , Disp: , Rfl:     diphenhydrAMINE (BENADRYL) 25 mg capsule, Take 1 capsule (25 mg total) by mouth every 6 (six) hours as needed for Itching or Allergies. (Patient not taking: Reported on 6/30/2022), Disp: 20 capsule, Rfl: 0    escitalopram oxalate (LEXAPRO) 20 MG tablet, Take 1 tablet by mouth every evening. , Disp: , Rfl:     famotidine (PEPCID) 20 MG tablet, Take 1 tablet (20 mg total) by mouth 2 (two) times daily., Disp: 20 tablet, Rfl: 0    fish oil-omega-3 fatty acids 300-1,000 mg capsule, Take 2 g by mouth once daily., Disp: , Rfl:     levothyroxine (SYNTHROID) 50 MCG tablet, Take 50 mcg by mouth before breakfast., Disp: , Rfl: 0    lisinopril (PRINIVIL,ZESTRIL) 20 MG tablet, Take 5 mg by mouth once daily. , Disp: , Rfl:     metFORMIN (GLUCOPHAGE) 1000 MG tablet, Take 1,000 mg by mouth 2 (two) times daily with meals., Disp: , Rfl: 0    multivitamin capsule, Take 1 capsule by mouth once daily., Disp: , Rfl:     pioglitazone (ACTOS) 30 MG tablet, Take 30 mg by mouth once daily., Disp: , Rfl:     vitamin  D (VITAMIN D3) 1000 units Tab, Take 1,000 Units by mouth once daily., Disp: , Rfl:     vitamin E 400 UNIT capsule, Take 400 Units by mouth once daily., Disp: , Rfl:     Allergies:  Review of patient's allergies indicates:  No Known Allergies    Vitals:  LMP  (LMP Unknown)     Physical Examination:  Ortho Exam   Right shoulder exam:  Skin incision is well healed.  Motion today reveals active elevation to about 70° and external rotation to 10° at the side.    IMAGING STUDIES:    I have ordered and independently reviewed the following imaging studies performed at [facility] today    Right shoulder x-ray series demonstrates loss of some of the initial fracture reduction with collapse of the humeral head.  The greater tuberosity is now proud.  The hardware is intact.  There is incomplete healing at the surgical neck.    Assessment:  1. Closed fracture of proximal end of right humerus with routine healing, subsequent encounter      Plan:  Patient will continue with physical therapy to progress her range of motion as much as possible.  I would like to see her back in 6 weeks with a new x-ray of the right shoulder.       No follow-ups on file.

## 2022-07-07 ENCOUNTER — PATIENT OUTREACH (OUTPATIENT)
Dept: EMERGENCY MEDICINE | Facility: HOSPITAL | Age: 62
End: 2022-07-07
Payer: COMMERCIAL

## 2022-07-07 ENCOUNTER — CLINICAL SUPPORT (OUTPATIENT)
Dept: REHABILITATION | Facility: HOSPITAL | Age: 62
End: 2022-07-07
Payer: COMMERCIAL

## 2022-07-07 DIAGNOSIS — R29.3 POSTURE ABNORMALITY: ICD-10-CM

## 2022-07-07 DIAGNOSIS — R29.898 DECREASED STRENGTH OF UPPER EXTREMITY: ICD-10-CM

## 2022-07-07 DIAGNOSIS — M25.611 DECREASED RIGHT SHOULDER RANGE OF MOTION: Primary | ICD-10-CM

## 2022-07-07 PROCEDURE — 97110 THERAPEUTIC EXERCISES: CPT | Mod: PN

## 2022-07-07 PROCEDURE — 97140 MANUAL THERAPY 1/> REGIONS: CPT | Mod: PN

## 2022-07-07 NOTE — PROGRESS NOTES
"[facility]   Physical Therapy Treatment Note     Name: Etta Roy  Clinic Number: [id number]    Therapy Diagnosis:   Encounter Diagnoses   Name Primary?    Decreased right shoulder range of motion Yes    Decreased strength of upper extremity     Posture abnormality      Physician: Dwain Galaviz MD    Visit Date: 7/7/2022    Physician Orders: PT Eval and Treat "Begin tonny PROM"  Medical Diagnosis from Referral: S42.201D (ICD-10-CM) - Closed fracture of proximal end of right humerus with routine healing, subsequent encounter  Evaluation Date: 6/20/2022  Authorization Period Expiration: 12/31/22  Plan of Care Expiration: 08/15/2022  Progress Note Due: 07/18/2022  Visit # / Visits authorized: 4/ 50   FOTO:3/10     Precautions: Standard     PTA Visit #: 0/5     Time In: 0905  Time Out: 10:00  Total Billable Time: 55 minutes    DOS: 4/28/22 ORIF of proximal humerus     SUBJECTIVE     Pt reports: no pain today. Feels reaching up is gradually getting better. Returned to Dr. Galaviz yesterday and pleased with progress     She was compliant with home exercise program.  Response to previous treatment: good with soreness that resolved  Functional change: none to note    Pain: 0/10  Location: right shoulder     OBJECTIVE     Objective Measures updated at progress report unless specified.     Passive Range of Motion:   Shoulder Right   Scaption 123 deg *   Abduction 90 deg *   ER at 0 46 deg *   ER at 90 40 deg */ 60 post    IR 50 deg *       Treatment     Etta received the treatments listed below:     THERAPEUTIC EXERCISES to develop strength, endurance, ROM, flexibility, posture and core stabilization for 40 minutes including:      +Supine chest press w yellow dowel x 20  Supine sh flex w yellow dowel 5 sec hold x 20  Supine sh ER at 45 deg w dowel 5 sec hold x 20  R upper trap stretch w overpressure 3 x 30 sec  - seated scap retractions; 20 x w/ 5" hold  Table slides: flexion " "(elevated), scaption, abduction, ER 5 sec hold x 20 ea  Post cap stretch across chest 3 x 30 sec  Pulleys flex x 3 min  Pulleys abd x 3 min  Rows w RTB x 30  B sh ext w RTB x 20  Post sh roll x 20 NT  Supine pec stretch in T position x 3 min  PROM sh flex, ER, and IR x 5 min  Shoulder isometrics abd, external rotation, flex 10x5"      Manual therapy techniques for 15 minutes including:    STM to R pec and subscapularis/lats  Scar mobs  Grade 3 post and ing R GH joint mob    Patient Education and Home Exercises     Home Exercises Provided and Patient Education Provided     Education provided:   - diagnosis/prognosis, goals for therapy, role of therapy for care, exercises/HEP     Written Home Exercises Provided: Patient instructed to cont prior HEP. 6/28/22. Exercises were reviewed and Etta was able to demonstrate them prior to the end of the session.  Etta demonstrated good  understanding of the education provided. See EMR under Patient Instructions for exercises provided during therapy sessions    ASSESSMENT     Etta presents 2.5 mo s/p ORIF right proximal humerus fracture. had good tolerance to treatment today with no adverse effects. Pt presents without complaints of pain. Pt demos good improvements in shoulder passive range of motion today. Active range of motion gradually improving but with compensatory shoulder elevation to achieve increased motion. Pt with good tolerance for range of motion exercises. Added shoulder isometrics without increase in pain. Plan to continue to progress shoulder range of motion and scapulohumeral motor control as tolerated.       Etta Is progressing well towards her goals.   Pt prognosis is Good.     Pt will continue to benefit from skilled outpatient physical therapy to address the deficits listed in the problem list box on initial evaluation, provide pt/family education and to maximize pt's level of independence in the home and community environment.     Pt's spiritual, " cultural and educational needs considered and pt agreeable to plan of care and goals.     Anticipated barriers to physical therapy: co-morbidities, time since sx     Goals:  Short-Term Goals: 4 weeks  - The patient will be independent with initial home exercise program.  - The patient will demonstrate good unsupported sitting posture with min verbal cues for 15 minutes.  - The patient will increase ROM 15 degrees to perform bathing and hygiene, grooming, toileting and dressing with pain < 010.  - The patient will increase strength by 1.2 muscle grade  to perform bathing and hygiene, grooming, toileting and dressing with pain < 0/10.     Long-Term Goals: 8 weeks  - The patient will be independent with home exercise program and symptom management.  - The patient will increase ROM = to uninvolved shoulder  to perform work duties and recreation/leisure activities   with pain < 0/10.  - The patient will increase strength = to uninvolved shoulder  to perform work duties and recreation/leisure activities   with pain < 0/10.    PLAN     Plan of care Certification: 6/20/2022 to 07/15/2022.     Outpatient Physical Therapy 2 times weekly for 8 weeks to include the following interventions: Aquatic Therapy, Manual Therapy, Moist Heat/ Ice, Neuromuscular Re-ed, Patient Education, Therapeutic Activities and Therapeutic Exercise.     Progress shoulder ROM and UE strengthening.    TRENA CESAR, PT

## 2022-07-07 NOTE — PROGRESS NOTES
Spoke with patient and she stated she was doing fine. Patient was asked if she had been to see her PCP she stated she had been. Patient was asked about PT and she stated it is going well. Patient denied needing any other assistance at this time.

## 2022-07-12 NOTE — PROGRESS NOTES
"[facility]   Physical Therapy Treatment Note     Name: Etta Roy  Clinic Number: [id number]    Therapy Diagnosis:   Encounter Diagnoses   Name Primary?    Decreased right shoulder range of motion Yes    Decreased strength of upper extremity     Posture abnormality      Physician: Dwain Galaviz MD    Visit Date: 7/14/2022    Physician Orders: PT Eval and Treat "Begin tonny PROM"  Medical Diagnosis from Referral: S42.201D (ICD-10-CM) - Closed fracture of proximal end of right humerus with routine healing, subsequent encounter  Evaluation Date: 6/20/2022  Authorization Period Expiration: 12/31/22  Plan of Care Expiration: 08/15/2022  Progress Note Due: 07/18/2022  Visit # / Visits authorized: 5/ 50   FOTO:3/10     Precautions: Standard     PTA Visit #: 0/5     Time In: 0900  Time Out: 1001  Total Billable Time: 33 minutes    DOS: 4/28/22 ORIF of proximal humerus     SUBJECTIVE     Pt reports: her shoulder continues to feel good with no new complications.      She was compliant with home exercise program.  Response to previous treatment: good with soreness that resolved  Functional change: able to use arm more    Pain: 0/10  Location: right shoulder     OBJECTIVE     Objective Measures updated at progress report unless specified.     Passive Range of Motion:   Shoulder Right   Scaption 123 deg *   Abduction 90 deg *   ER at 0 46 deg *   ER at 90 40 deg */ 60 post    IR 50 deg *     Treatment     Etta received the treatments listed below:     THERAPEUTIC EXERCISES to develop strength, endurance, ROM, flexibility, posture and core stabilization for 51 minutes including:      Supine chest press w yellow dowel x 20  Supine sh flex w yellow dowel 5 sec hold x 20- NP  Supine sh ER at 45 deg w dowel 5 sec hold x 20- NP  R upper trap stretch w overpressure 3 x 30 sec  seated scap retractions w YTB x 20  Table slides: flexion (elevated), scaption, abduction, ER 5 sec hold x 20 ea- " "NP  Post cap stretch across chest 3 x 30 sec  Pulleys flex x 3 min  Standing pulleys flex x 3 min  Pulleys abd x 3 min- NP  Supine pull down w YTB x 20  Wall slides w YTB 2 x 10  Rows w RTB x 30  B sh ext w RTB x 20- NP  Post sh roll x 20- NP  Supine pec stretch in T position x 3 min- NP  PROM sh flex, ER, and IR x 5 min  Shoulder isometrics abd, external rotation, flex 10x5"- NP  Seated sh IR w YTB w elbow propped on mat x 20  Sh flex walk outs w hand on elevated HOB 5 sec hold x 20     Manual therapy techniques for 10 minutes including:    STM to R pec and subscapularis/lats  Scar mobs  Grade 3 post and ing R GH joint mob    Patient Education and Home Exercises     Home Exercises Provided and Patient Education Provided     Education provided:   - diagnosis/prognosis, goals for therapy, role of therapy for care, exercises/HEP     Written Home Exercises Provided: Patient instructed to cont prior HEP. 6/28/22. Exercises were reviewed and Etta was able to demonstrate them prior to the end of the session.  Etta demonstrated good  understanding of the education provided. See EMR under Patient Instructions for exercises provided during therapy sessions    ASSESSMENT     Etta presents 11 weeks s/p ORIF right proximal humerus fracture. Pt had good tolerance to treatment today with no adverse effects. Post-treatment R shoulder pain rated as 0/10. Pt presents to clinic with continues difficulty with shoulder AROM past 45 deg and heavy upper trap compensation. Good response to progression of exercise program. Improvement in chest press strength noted. Able to achieve increased shoulder flexion ROM with wall slides. Heavy verbal and manual cueing to avoid shoulder hike. Pt able to demonstrate self-correction and awareness. Plan to continue to progress shoulder range of motion and scapulohumeral motor control as tolerated.     Etta Is progressing well towards her goals.   Pt prognosis is Good.     Pt will continue to " benefit from skilled outpatient physical therapy to address the deficits listed in the problem list box on initial evaluation, provide pt/family education and to maximize pt's level of independence in the home and community environment.     Pt's spiritual, cultural and educational needs considered and pt agreeable to plan of care and goals.     Anticipated barriers to physical therapy: co-morbidities, time since sx     Goals:  Short-Term Goals: 4 weeks  - The patient will be independent with initial home exercise program.  - The patient will demonstrate good unsupported sitting posture with min verbal cues for 15 minutes.  - The patient will increase ROM 15 degrees to perform bathing and hygiene, grooming, toileting and dressing with pain < 010.  - The patient will increase strength by 1.2 muscle grade  to perform bathing and hygiene, grooming, toileting and dressing with pain < 0/10.     Long-Term Goals: 8 weeks  - The patient will be independent with home exercise program and symptom management.  - The patient will increase ROM = to uninvolved shoulder  to perform work duties and recreation/leisure activities   with pain < 0/10.  - The patient will increase strength = to uninvolved shoulder  to perform work duties and recreation/leisure activities   with pain < 0/10.    PLAN     Plan of care Certification: 6/20/2022 to 07/15/2022.     Outpatient Physical Therapy 2 times weekly for 8 weeks to include the following interventions: Aquatic Therapy, Manual Therapy, Moist Heat/ Ice, Neuromuscular Re-ed, Patient Education, Therapeutic Activities and Therapeutic Exercise.     Progress shoulder ROM and UE strengthening.    Nadine Jay, PT

## 2022-07-14 ENCOUNTER — CLINICAL SUPPORT (OUTPATIENT)
Dept: REHABILITATION | Facility: HOSPITAL | Age: 62
End: 2022-07-14
Payer: COMMERCIAL

## 2022-07-14 DIAGNOSIS — R29.3 POSTURE ABNORMALITY: ICD-10-CM

## 2022-07-14 DIAGNOSIS — R29.898 DECREASED STRENGTH OF UPPER EXTREMITY: ICD-10-CM

## 2022-07-14 DIAGNOSIS — M25.611 DECREASED RIGHT SHOULDER RANGE OF MOTION: Primary | ICD-10-CM

## 2022-07-14 PROCEDURE — 97110 THERAPEUTIC EXERCISES: CPT | Mod: PN

## 2022-07-19 ENCOUNTER — CLINICAL SUPPORT (OUTPATIENT)
Dept: REHABILITATION | Facility: HOSPITAL | Age: 62
End: 2022-07-19
Payer: COMMERCIAL

## 2022-07-19 DIAGNOSIS — M25.611 DECREASED RIGHT SHOULDER RANGE OF MOTION: Primary | ICD-10-CM

## 2022-07-19 DIAGNOSIS — R29.898 DECREASED STRENGTH OF UPPER EXTREMITY: ICD-10-CM

## 2022-07-19 DIAGNOSIS — R29.3 POSTURE ABNORMALITY: ICD-10-CM

## 2022-07-19 PROCEDURE — 97140 MANUAL THERAPY 1/> REGIONS: CPT | Mod: PN

## 2022-07-19 PROCEDURE — 97110 THERAPEUTIC EXERCISES: CPT | Mod: PN

## 2022-07-19 NOTE — PROGRESS NOTES
"[facility]   Physical Therapy Treatment Note     Name: Etta Irwin  Clinic Number: [id number]    Therapy Diagnosis:   No diagnosis found.  Physician: Dwain Galaviz MD    Visit Date: 7/19/2022    Physician Orders: PT Eval and Treat "Begin tonny PROM"  Medical Diagnosis from Referral: S42.201D (ICD-10-CM) - Closed fracture of proximal end of right humerus with routine healing, subsequent encounter  Evaluation Date: 6/20/2022  Authorization Period Expiration: 12/31/22  Plan of Care Expiration: 08/15/2022  Progress Note Due: 07/18/2022  Visit # / Visits authorized: 5/ 50   FOTO:3/10     Precautions: Standard     PTA Visit #: 0/5     Time In:11:00  Time Out:12:00  Total Billable Time: 60 minutes- 3 TE, 1 MT    DOS: 4/28/22 ORIF of proximal humerus     SUBJECTIVE     Pt reports: her shoulder continues to feel good with no new complications. Hard to do her hair.     She was compliant with home exercise program.  Response to previous treatment: good with soreness that resolved  Functional change: able to use arm more    Pain: 0/10  Location: right shoulder     OBJECTIVE     Objective Measures updated at progress report unless specified.     Active Range of Motion:    Shoulder Right SUPINE 7/14/22 Right SUPINE 7/19/22 (degrees) RIGHT Active, standing 7/19/22 (degrees)   Scaption 123 deg * 133 (140)  65 with upper trap compensation   Abduction 90 deg * 93 (100) 45  with upper trap compensation   ER at 90 40 deg */ 60 post  60 (65) External rotation at 0 degrees: 60    internal rotation at 90 degrees 50 deg * 50 (55) -       Strength, right shoulder (7/19/22):  Flexion: 4+/5 (small arc <90 degrees, prevent UT compensation)  Abduction: 5/5 (small arc <90 degrees, prevent UT compensation)  External rotation: 4/5  Internal rotation at 0: 5/5;  At 45 degrees: 4-/5      Treatment     Etta received the treatments listed below:     THERAPEUTIC EXERCISES to develop strength, endurance, ROM, " "flexibility, posture and core stabilization for 40 minutes including:    Bold= performed today  (+)= added today    Pulleys flex, scaption x 2 min each  Supine chest press w orange, 2# dowel x 20  Supine shoulder flex w orange dowel 5 sec hold x 20  Supine sh ER at 45 deg w dowel 5 sec hold x 20  Post cap stretch across chest 3 x 30 sec  Supine pull down w YTB x 20  Wall slides w YTB 2 x 10  Bilateral GH external rotation with red band 2x10  Rows w RTB x 30  +Straight arm pull-down red band 2x10 reps  +Thread the needle in modified plantigrade  R upper trap stretch w overpressure 3 x 30 sec  Table slides: flexion (elevated), scaption, abduction, ER 5 sec hold x 20 ea  Post sh roll x 20- NP  Supine pec stretch in T position x 3 min- NP  Shoulder isometrics abd, external rotation, flex 10x5"  Seated sh IR w YTB w elbow propped on mat x 20  Sh flex walk outs w hand on elevated HOB 5 sec hold x 20     Manual therapy techniques for 15 minutes including:  Grade 3 post and inf R GH joint mob  Passive range of motion right shoulder      Patient Education and Home Exercises     Home Exercises Provided and Patient Education Provided     Education provided:   - diagnosis/prognosis, goals for therapy, role of therapy for care, exercises/HEP     Written Home Exercises Provided: Patient instructed to cont prior HEP. 6/28/22. Exercises were reviewed and Etta was able to demonstrate them prior to the end of the session.  Etta demonstrated good  understanding of the education provided. See EMR under Patient Instructions for exercises provided during therapy sessions    ASSESSMENT     Etta presents s/p ORIF right proximal humerus fracture, week 12 of recovery. Pt had good tolerance to treatment today with no adverse effects. Range of motion shows improvement in flexion/scaption and external rotation while abduction and internal rotation remains the same as previously measured. Her strength has improved. Will need to continue to " address strength against gravity and/or scapulohumeral rhythm to avoid right upper trapezius overuse/compensation.      Etta Is progressing well towards her goals.   Pt prognosis is Good.     Pt will continue to benefit from skilled outpatient physical therapy to address the deficits listed in the problem list box on initial evaluation, provide pt/family education and to maximize pt's level of independence in the home and community environment.     Pt's spiritual, cultural and educational needs considered and pt agreeable to plan of care and goals.     Anticipated barriers to physical therapy: co-morbidities, time since sx     Goals:  Short-Term Goals: 4 weeks  - The patient will be independent with initial home exercise program.  - The patient will demonstrate good unsupported sitting posture with min verbal cues for 15 minutes.  - The patient will increase ROM 15 degrees to perform bathing and hygiene, grooming, toileting and dressing with pain < 0/10. Met for scaption, external rotation. Not met for abd, internal rotation.  - The patient will increase strength by 1.2 muscle grade  to perform bathing and hygiene, grooming, toileting and dressing with pain < 0/10.     Long-Term Goals: 8 weeks  - The patient will be independent with home exercise program and symptom management.  - The patient will increase ROM = to uninvolved shoulder  to perform work duties and recreation/leisure activities   with pain < 0/10.  - The patient will increase strength = to uninvolved shoulder  to perform work duties and recreation/leisure activities   with pain < 0/10.    PLAN     Plan of care Certification: 6/20/2022 to 08/15/2022.     Outpatient Physical Therapy 2 times weekly for 8 weeks to include the following interventions: Aquatic Therapy, Manual Therapy, Moist Heat/ Ice, Neuromuscular Re-ed, Patient Education, Therapeutic Activities and Therapeutic Exercise.     Progress shoulder ROM and UE strengthening.    Carolin DIAZ  Delaney, PT , DPT  7/19/2022

## 2022-07-22 ENCOUNTER — CLINICAL SUPPORT (OUTPATIENT)
Dept: REHABILITATION | Facility: HOSPITAL | Age: 62
End: 2022-07-22
Payer: COMMERCIAL

## 2022-07-22 DIAGNOSIS — M25.611 DECREASED RIGHT SHOULDER RANGE OF MOTION: Primary | ICD-10-CM

## 2022-07-22 DIAGNOSIS — R29.3 POSTURE ABNORMALITY: ICD-10-CM

## 2022-07-22 DIAGNOSIS — R29.898 DECREASED STRENGTH OF UPPER EXTREMITY: ICD-10-CM

## 2022-07-22 PROCEDURE — 97110 THERAPEUTIC EXERCISES: CPT | Mod: PN

## 2022-07-22 PROCEDURE — 97140 MANUAL THERAPY 1/> REGIONS: CPT | Mod: PN

## 2022-07-22 NOTE — PROGRESS NOTES
"[facility]   Physical Therapy Treatment Note     Name: Etta Irwni  Clinic Number: [id number]    Therapy Diagnosis:   Encounter Diagnoses   Name Primary?    Decreased right shoulder range of motion Yes    Decreased strength of upper extremity     Posture abnormality      Physician: Dwain Galaviz MD    Visit Date: 7/22/2022    Physician Orders: PT Eval and Treat "Begin tonny PROM"  Medical Diagnosis from Referral: S42.201D (ICD-10-CM) - Closed fracture of proximal end of right humerus with routine healing, subsequent encounter  Evaluation Date: 6/20/2022  Authorization Period Expiration: 12/31/22  Plan of Care Expiration: 08/15/2022  Progress Note Due: 08/15/2022  Visit # / Visits authorized: 7/ 50   FOTO:3/10     Precautions: Standard     PTA Visit #: 0/5     Time In:9:05  Time Out:10:00  Total Billable Time: 55 minutes- 3 TE, 1 MT    DOS: 4/28/22 ORIF of proximal humerus     SUBJECTIVE     Pt reports: no shoulder pain today, just hard to raise arm.    She was compliant with home exercise program.  Response to previous treatment: good with soreness that resolved  Functional change: able to use arm more    Pain: 0/10  Location: right shoulder     OBJECTIVE     Objective Measures updated at progress report unless specified.     Shoulder flexion following manual 145 prom    Treatment     Etta received the treatments listed below:     THERAPEUTIC EXERCISES to develop strength, endurance, ROM, flexibility, posture and core stabilization for 40 minutes including:    Bold= performed today  (+)= added today    Pulleys flex, scaption x 2 min each  Supine chest press w orange, 2# dowel x 20  Supine shoulder flex w orange dowel 5 sec hold x 20  Supine sh ER at 45 deg w dowel 5 sec hold x 30  Post cap stretch across chest 3 x 30 sec  Supine pull down w YTB x 20  Wall slides w YTB 2 x 10  Bilateral GH external rotation with red band 2x10  Rows w +GTB x 30  Straight arm pull-down red " "band +3x10 reps  Thread the needle in modified plantigrade 15x  R upper trap stretch w overpressure 3 x 30 sec  Table slides: flexion (performed at stairs), scaption, abduction, ER 5 sec hold x 20 ea  Post sh roll x 20- NP  Supine pec stretch in T position x 3 min- NP  Shoulder isometrics abd, external rotation, flex 10x5"  Seated sh IR w YTB w elbow propped on mat x 20  Sh flex walk outs w hand on elevated HOB 5 sec hold x 20   internal rotation/ER walkouts red theraband     Manual therapy techniques for 15 minutes including:  Grade 3 post and inf R GH joint mob  Passive range of motion right shoulder  Scapular mobilizations      Patient Education and Home Exercises     Home Exercises Provided and Patient Education Provided     Education provided:   - diagnosis/prognosis, goals for therapy, role of therapy for care, exercises/HEP     Written Home Exercises Provided: Patient instructed to cont prior HEP. 6/28/22. Exercises were reviewed and Etta was able to demonstrate them prior to the end of the session.  Etta demonstrated good  understanding of the education provided. See EMR under Patient Instructions for exercises provided during therapy sessions    ASSESSMENT     Etta presents s/p ORIF right proximal humerus fracture, week 12 of recovery.  Pt gradually progressing shoulder range of motion. She required frequent cuing for shoulder elevation. Added scapular mobilizations with improvement in shoulder range of motion following. Pt without increased pain with today's progressions. No increased pain reported post tx. Continue to progress as tolerated.     Etta Is progressing well towards her goals.   Pt prognosis is Good.     Pt will continue to benefit from skilled outpatient physical therapy to address the deficits listed in the problem list box on initial evaluation, provide pt/family education and to maximize pt's level of independence in the home and community environment.     Pt's spiritual, cultural and " educational needs considered and pt agreeable to plan of care and goals.     Anticipated barriers to physical therapy: co-morbidities, time since sx     Goals:  Short-Term Goals: 4 weeks  - The patient will be independent with initial home exercise program.  - The patient will demonstrate good unsupported sitting posture with min verbal cues for 15 minutes.  - The patient will increase ROM 15 degrees to perform bathing and hygiene, grooming, toileting and dressing with pain < 0/10. Met for scaption, external rotation. Not met for abd, internal rotation.  - The patient will increase strength by 1.2 muscle grade  to perform bathing and hygiene, grooming, toileting and dressing with pain < 0/10.     Long-Term Goals: 8 weeks  - The patient will be independent with home exercise program and symptom management.  - The patient will increase ROM = to uninvolved shoulder  to perform work duties and recreation/leisure activities   with pain < 0/10.  - The patient will increase strength = to uninvolved shoulder  to perform work duties and recreation/leisure activities   with pain < 0/10.    PLAN     Plan of care Certification: 6/20/2022 to 08/15/2022.     Outpatient Physical Therapy 2 times weekly for 8 weeks to include the following interventions: Aquatic Therapy, Manual Therapy, Moist Heat/ Ice, Neuromuscular Re-ed, Patient Education, Therapeutic Activities and Therapeutic Exercise.     Progress shoulder ROM and UE strengthening.    TRENA CESAR, PT , DPT  7/22/2022

## 2022-07-26 ENCOUNTER — CLINICAL SUPPORT (OUTPATIENT)
Dept: REHABILITATION | Facility: HOSPITAL | Age: 62
End: 2022-07-26
Payer: COMMERCIAL

## 2022-07-26 DIAGNOSIS — R29.3 POSTURE ABNORMALITY: ICD-10-CM

## 2022-07-26 DIAGNOSIS — M25.611 DECREASED RIGHT SHOULDER RANGE OF MOTION: Primary | ICD-10-CM

## 2022-07-26 DIAGNOSIS — R29.898 DECREASED STRENGTH OF UPPER EXTREMITY: ICD-10-CM

## 2022-07-26 PROCEDURE — 97140 MANUAL THERAPY 1/> REGIONS: CPT | Mod: PN,CQ

## 2022-07-26 PROCEDURE — 97110 THERAPEUTIC EXERCISES: CPT | Mod: PN,CQ

## 2022-07-26 NOTE — PROGRESS NOTES
"[facility]   Physical Therapy Treatment Note     Name: Etta Irwin  Clinic Number: [id number]    Therapy Diagnosis:   Encounter Diagnoses   Name Primary?    Decreased right shoulder range of motion Yes    Decreased strength of upper extremity     Posture abnormality      Physician: Dwain Galaviz MD    Visit Date: 7/26/2022    Physician Orders: PT Eval and Treat "Begin tonny PROM"  Medical Diagnosis from Referral: S42.201D (ICD-10-CM) - Closed fracture of proximal end of right humerus with routine healing, subsequent encounter  Evaluation Date: 6/20/2022  Authorization Period Expiration: 12/31/22  Plan of Care Expiration: 08/15/2022  Progress Note Due: 08/15/2022  Visit # / Visits authorized: 8/ 50   FOTO:  8/10     Precautions: Standard     PTA Visit #: 1/5     Time In:9:05 am  Time Out:10:00 am  Total Billable Time: 55 minutes- 3 TE, 1 MT    DOS: 4/28/22 ORIF of proximal humerus     SUBJECTIVE     Pt reports: continued difficulty raising arm.  States no pain  She was compliant with home exercise program.  Response to previous treatment: good with soreness that resolved  Functional change: able to use arm more    Pain: 0/10  Location: right shoulder     OBJECTIVE     Objective Measures updated at progress report unless specified.     Shoulder flexion following manual 145 prom    Treatment     Etta received the treatments listed below:     THERAPEUTIC EXERCISES to develop strength, endurance, ROM, flexibility, posture and core stabilization for 40 minutes including:    Bold= performed today  (+)= added today    Pulleys flex, scaption x 2 min each  Supine chest press w orange, 2# dowel x 20  Supine shoulder flex w orange dowel 5 sec hold x 20  Supine sh ER at 45 deg w dowel 5 sec hold x 30  Post cap stretch across chest 3 x 30 sec  Supine pull down w +RTB 2x10  Bilateral GH external rotation with red band 2x10  Rows w +GTB x 30  Straight arm pull-down red band +3x10 " "reps  Thread the needle in modified plantigrade 15x right  Sh flex walk outs w hand on elevated head of mat 5 sec hold x 20   Wall slides w YTB 2 x 10  R upper trap stretch w overpressure 3 x 30 sec  Table slides: flexion (performed at stairs), scaption, abduction, ER 5 sec hold x 20 ea  Post sh roll x 20- NP  Supine pec stretch in T position x 3 min- NP  Shoulder isometrics abd, external rotation, flex 10x5"  Seated sh IR w YTB w elbow propped on mat x 20 NP  internal rotation/ER walkouts red theraband R 1x10 each     Manual therapy techniques for 15 minutes including:  Grade 3 post and inf R GH joint mob  Passive range of motion right shoulder  Scapular mobilizations      Patient Education and Home Exercises     Home Exercises Provided and Patient Education Provided     Education provided:   - diagnosis/prognosis, goals for therapy, role of therapy for care, exercises/HEP     Written Home Exercises Provided: Patient instructed to cont prior HEP. 6/28/22. Exercises were reviewed and Etta was able to demonstrate them prior to the end of the session.  Etta demonstrated good  understanding of the education provided. See EMR under Patient Instructions for exercises provided during therapy sessions    ASSESSMENT     Etta presents s/p ORIF right proximal humerus fracture, week 12 of recovery.  Pt gradually progressing shoulder range of motion. She required frequent cuing to decrease  shoulder elevation with performance of therex.. Continued with scapular mobilizations with improvement in shoulder range of motion following. Pt without increased pain with today's progressions. No increased pain reported post tx. Continue to progress as tolerated.     Etta Is progressing well towards her goals.   Pt prognosis is Good.     Pt will continue to benefit from skilled outpatient physical therapy to address the deficits listed in the problem list box on initial evaluation, provide pt/family education and to maximize pt's level " of independence in the home and community environment.     Pt's spiritual, cultural and educational needs considered and pt agreeable to plan of care and goals.     Anticipated barriers to physical therapy: co-morbidities, time since sx     Goals:  Short-Term Goals: 4 weeks  - The patient will be independent with initial home exercise program.  - The patient will demonstrate good unsupported sitting posture with min verbal cues for 15 minutes.  - The patient will increase ROM 15 degrees to perform bathing and hygiene, grooming, toileting and dressing with pain < 0/10. Met for scaption, external rotation. Not met for abd, internal rotation.  - The patient will increase strength by 1.2 muscle grade  to perform bathing and hygiene, grooming, toileting and dressing with pain < 0/10.     Long-Term Goals: 8 weeks  - The patient will be independent with home exercise program and symptom management.  - The patient will increase ROM = to uninvolved shoulder  to perform work duties and recreation/leisure activities   with pain < 0/10.  - The patient will increase strength = to uninvolved shoulder  to perform work duties and recreation/leisure activities   with pain < 0/10.    PLAN     Plan of care Certification: 6/20/2022 to 08/15/2022.     Outpatient Physical Therapy 2 times weekly for 8 weeks to include the following interventions: Aquatic Therapy, Manual Therapy, Moist Heat/ Ice, Neuromuscular Re-ed, Patient Education, Therapeutic Activities and Therapeutic Exercise.     Progress shoulder ROM and UE strengthening.    Yanira Hughes, PTA   7/26/2022

## 2022-07-28 ENCOUNTER — PATIENT OUTREACH (OUTPATIENT)
Dept: EMERGENCY MEDICINE | Facility: HOSPITAL | Age: 62
End: 2022-07-28
Payer: COMMERCIAL

## 2022-07-29 ENCOUNTER — CLINICAL SUPPORT (OUTPATIENT)
Dept: REHABILITATION | Facility: HOSPITAL | Age: 62
End: 2022-07-29
Payer: COMMERCIAL

## 2022-07-29 DIAGNOSIS — M25.611 DECREASED RIGHT SHOULDER RANGE OF MOTION: Primary | ICD-10-CM

## 2022-07-29 DIAGNOSIS — R29.3 POSTURE ABNORMALITY: ICD-10-CM

## 2022-07-29 DIAGNOSIS — R29.898 DECREASED STRENGTH OF UPPER EXTREMITY: ICD-10-CM

## 2022-07-29 PROCEDURE — 97140 MANUAL THERAPY 1/> REGIONS: CPT | Mod: PN

## 2022-07-29 PROCEDURE — 97110 THERAPEUTIC EXERCISES: CPT | Mod: PN

## 2022-07-29 NOTE — PROGRESS NOTES
"[facility]   Physical Therapy Treatment Note     Name: Etta Irwin  Clinic Number: [id number]    Therapy Diagnosis:   Encounter Diagnoses   Name Primary?    Decreased right shoulder range of motion Yes    Decreased strength of upper extremity     Posture abnormality      Physician: Dwain Galaviz MD    Visit Date: 7/29/2022    Physician Orders: PT Eval and Treat "Begin tonny PROM"  Medical Diagnosis from Referral: S42.201D (ICD-10-CM) - Closed fracture of proximal end of right humerus with routine healing, subsequent encounter  Evaluation Date: 6/20/2022  Authorization Period Expiration: 12/31/22  Plan of Care Expiration: 08/15/2022  Progress Note Due: 08/15/2022  Visit # / Visits authorized: 8/ 50   FOTO:  8/10     Precautions: Standard     PTA Visit #: 1/5     Time In:10:03 am  Time Out:11:00 am  Total Billable Time: 57 minutes- 3 TE, 1 MT    DOS: 4/28/22 ORIF of proximal humerus     SUBJECTIVE     Pt reports: she can almost do her hair in the back. No pain.   She was compliant with home exercise program.  Response to previous treatment: good with soreness that resolved  Functional change: able to use arm more    Pain: 0/10  Location: right shoulder     OBJECTIVE     Objective Measures updated at progress report unless specified.     Shoulder flexion following manual 145 prom    Treatment     Etta received the treatments listed below:     THERAPEUTIC EXERCISES to develop strength, endurance, ROM, flexibility, posture and core stabilization for 42 minutes including:    Bold= performed today  (+)= added today    Pulleys flex, scaption x 2 min each  Supine chest press w orange, 3# dowel x 20  Supine shoulder flex w red dowel 5 sec hold x 20  Supine sh ER at 45 deg w drtowel 5 sec hold x 30  +sidelying external rotation 2x10  + sidelying abd 2x10  +sidelying horizontal abd 20x   Post cap stretch across chest 3 x 30 sec  Bilateral GH external rotation with red band " "2x10  Rows w +GTB x 30  Straight arm pull-down red band +3x10 reps  Thread the needle in modified plantigrade 15x right  Sh flex walk outs w hand on elevated head of mat 5 sec hold x 20   Wall slides w pillowcase 2 x 10 (10 with serratus focus, 10 range of motion)  R upper trap stretch w overpressure 3 x 30 sec  Post sh roll x 20- NP  Supine pec stretch in T position x 3 min- NP  Shoulder isometrics abd, external rotation, flex 10x5"  Seated sh IR w YTB w elbow propped on mat x 20 NP  internal rotation/ER walkouts red theraband R 1x15 each     Manual therapy techniques for 15 minutes including:  Grade 3 post and inf R GH joint mob  Passive range of motion right shoulder  Scapular mobilizations      Patient Education and Home Exercises     Home Exercises Provided and Patient Education Provided     Education provided:   - diagnosis/prognosis, goals for therapy, role of therapy for care, exercises/HEP     Written Home Exercises Provided: Patient instructed to cont prior HEP. 6/28/22. Exercises were reviewed and Etta was able to demonstrate them prior to the end of the session.  Etta demonstrated good  understanding of the education provided. See EMR under Patient Instructions for exercises provided during therapy sessions    ASSESSMENT     Etta presents s/p ORIF right proximal humerus fracture, week 12 of recovery. She tolerated today's session well. Added further posterior shoulder activation with good tolerance though was challenged with new activities. Pt gradually progressing shoulder range of motion and mobility. Continued cuing for shoulder elevation with exercises. No increased pain post treatment continue to progress as tolerated.     Etta Is progressing well towards her goals.   Pt prognosis is Good.     Pt will continue to benefit from skilled outpatient physical therapy to address the deficits listed in the problem list box on initial evaluation, provide pt/family education and to maximize pt's level of " independence in the home and community environment.     Pt's spiritual, cultural and educational needs considered and pt agreeable to plan of care and goals.     Anticipated barriers to physical therapy: co-morbidities, time since sx     Goals:  Short-Term Goals: 4 weeks  - The patient will be independent with initial home exercise program.  - The patient will demonstrate good unsupported sitting posture with min verbal cues for 15 minutes.  - The patient will increase ROM 15 degrees to perform bathing and hygiene, grooming, toileting and dressing with pain < 0/10. Met for scaption, external rotation. Not met for abd, internal rotation.  - The patient will increase strength by 1.2 muscle grade  to perform bathing and hygiene, grooming, toileting and dressing with pain < 0/10.     Long-Term Goals: 8 weeks  - The patient will be independent with home exercise program and symptom management.  - The patient will increase ROM = to uninvolved shoulder  to perform work duties and recreation/leisure activities   with pain < 0/10.  - The patient will increase strength = to uninvolved shoulder  to perform work duties and recreation/leisure activities   with pain < 0/10.    PLAN     Plan of care Certification: 6/20/2022 to 08/15/2022.     Outpatient Physical Therapy 2 times weekly for 8 weeks to include the following interventions: Aquatic Therapy, Manual Therapy, Moist Heat/ Ice, Neuromuscular Re-ed, Patient Education, Therapeutic Activities and Therapeutic Exercise.     Progress shoulder ROM and UE strengthening.    TRENA CESAR, PT   7/29/2022

## 2022-08-02 ENCOUNTER — PATIENT OUTREACH (OUTPATIENT)
Dept: EMERGENCY MEDICINE | Facility: HOSPITAL | Age: 62
End: 2022-08-02
Payer: COMMERCIAL

## 2022-08-04 ENCOUNTER — TELEPHONE (OUTPATIENT)
Dept: REHABILITATION | Facility: HOSPITAL | Age: 62
End: 2022-08-04
Payer: COMMERCIAL

## 2022-08-09 ENCOUNTER — CLINICAL SUPPORT (OUTPATIENT)
Dept: REHABILITATION | Facility: HOSPITAL | Age: 62
End: 2022-08-09
Payer: COMMERCIAL

## 2022-08-09 DIAGNOSIS — R29.898 DECREASED STRENGTH OF UPPER EXTREMITY: ICD-10-CM

## 2022-08-09 DIAGNOSIS — R29.3 POSTURE ABNORMALITY: ICD-10-CM

## 2022-08-09 DIAGNOSIS — M25.611 DECREASED RIGHT SHOULDER RANGE OF MOTION: Primary | ICD-10-CM

## 2022-08-09 PROCEDURE — 97140 MANUAL THERAPY 1/> REGIONS: CPT | Mod: PN,CQ

## 2022-08-09 PROCEDURE — 97110 THERAPEUTIC EXERCISES: CPT | Mod: PN,CQ

## 2022-08-09 NOTE — PROGRESS NOTES
"[facility]   Physical Therapy Treatment Note     Name: Etta Irwin  Clinic Number: [id number]    Therapy Diagnosis:   Encounter Diagnoses   Name Primary?    Decreased right shoulder range of motion Yes    Decreased strength of upper extremity     Posture abnormality      Physician: Dwain Galaviz MD    Visit Date: 8/9/2022    Physician Orders: PT Eval and Treat "Begin tonny PROM"  Medical Diagnosis from Referral: S42.201D (ICD-10-CM) - Closed fracture of proximal end of right humerus with routine healing, subsequent encounter  Evaluation Date: 6/20/2022  Authorization Period Expiration: 12/31/22  Plan of Care Expiration: 08/15/2022  Progress Note Due: 08/15/2022  Visit # / Visits authorized: 10/ 50   FOTO:  10/10  NEXT     Precautions: Standard     PTA Visit #: 1/5     Time In:10:03 am  Time Out:11:00 am  Total Billable Time: 60 minutes- 3 TE, 1 MT    DOS: 4/28/22 ORIF of proximal humerus     SUBJECTIVE     Pt reports: no complaints of pain.  States able to wash her hair but difficulty pulling it up to fix it.  She was compliant with home exercise program.  Response to previous treatment: good with soreness that resolved  Functional change: able to use arm more can wash her hair    Pain: 0/10  Location: right shoulder     OBJECTIVE     Objective Measures updated at progress report unless specified.     Shoulder flexion following manual 145 prom    Treatment     Etta received the treatments listed below:     THERAPEUTIC EXERCISES to develop strength, endurance, ROM, flexibility, posture and core stabilization for 45 minutes including:    Bold= performed today  (+)= added today    Pulleys flex, scaption x 2 min each  Supine chest press w orange, (2#() dowel 3x10  Supine shoulder flex w red (3#) dowel 5 sec hold x 20  Supine sh ER at 45 deg with towel 5 sec hold x 30  Sidelying external rotation +2x12  Sidelying abd +2x12   Sidelying horizontal abd +2x12   Post cap stretch " "across chest 3 x 30 sec  Bilateral GH external rotation with red band 2x10  Thread the needle in modified plantigrade 15x right  Sh flex walk outs w hand on elevated head of mat 5 sec hold x 20   Rows w GTB x 30  Straight arm pull-down +green band 2x10 reps  Wall slides w pillowcase 2 x 10 (10 with serratus focus, 10 range of motion) Out of time  R upper trap stretch w overpressure 3 x 30 sec  Post sh roll x 20- NP  Supine pec stretch in T position x 3 min- NP  Shoulder isometrics abd, external rotation, flex 10x5"  Seated sh IR w YTB w elbow propped on mat x 20 NP  internal rotation/ER walkouts red theraband R 1x15 each     Manual therapy techniques for 15 minutes including:  Grade 3 post and inf R GH joint mob  Passive range of motion right shoulder  Scapular mobilizations      Patient Education and Home Exercises     Home Exercises Provided and Patient Education Provided     Education provided:   - diagnosis/prognosis, goals for therapy, role of therapy for care, exercises/HEP     Written Home Exercises Provided: Patient instructed to cont prior HEP. 6/28/22. Exercises were reviewed and Etta was able to demonstrate them prior to the end of the session.  Etta demonstrated good  understanding of the education provided. See EMR under Patient Instructions for exercises provided during therapy sessions    ASSESSMENT     Etta presents s/p ORIF right proximal humerus fracture, week 12 of recovery. She tolerated today's session well including progressions as bolded   Added further posterior shoulder activation with good tolerance though was challenged with new activities. Pt gradually progressing shoulder range of motion and mobility. Continued cuing for shoulder elevation with exercises. No increased pain post treatment continue to progress as tolerated.     Etta Is progressing well towards her goals.   Pt prognosis is Good.     Pt will continue to benefit from skilled outpatient physical therapy to address the " deficits listed in the problem list box on initial evaluation, provide pt/family education and to maximize pt's level of independence in the home and community environment.     Pt's spiritual, cultural and educational needs considered and pt agreeable to plan of care and goals.     Anticipated barriers to physical therapy: co-morbidities, time since sx     Goals:  Short-Term Goals: 4 weeks  - The patient will be independent with initial home exercise program.  - The patient will demonstrate good unsupported sitting posture with min verbal cues for 15 minutes.  - The patient will increase ROM 15 degrees to perform bathing and hygiene, grooming, toileting and dressing with pain < 0/10. Met for scaption, external rotation. Not met for abd, internal rotation.  - The patient will increase strength by 1.2 muscle grade  to perform bathing and hygiene, grooming, toileting and dressing with pain < 0/10.     Long-Term Goals: 8 weeks  - The patient will be independent with home exercise program and symptom management.  - The patient will increase ROM = to uninvolved shoulder  to perform work duties and recreation/leisure activities   with pain < 0/10.  - The patient will increase strength = to uninvolved shoulder  to perform work duties and recreation/leisure activities   with pain < 0/10.    PLAN     Plan of care Certification: 6/20/2022 to 08/15/2022.     Outpatient Physical Therapy 2 times weekly for 8 weeks to include the following interventions: Aquatic Therapy, Manual Therapy, Moist Heat/ Ice, Neuromuscular Re-ed, Patient Education, Therapeutic Activities and Therapeutic Exercise.     Progress shoulder ROM and UE strengthening.    Yanira Hughes, PTA   8/9/2022

## 2022-08-11 ENCOUNTER — CLINICAL SUPPORT (OUTPATIENT)
Dept: REHABILITATION | Facility: HOSPITAL | Age: 62
End: 2022-08-11
Payer: COMMERCIAL

## 2022-08-11 DIAGNOSIS — M25.611 DECREASED RIGHT SHOULDER RANGE OF MOTION: Primary | ICD-10-CM

## 2022-08-11 DIAGNOSIS — R29.898 DECREASED STRENGTH OF UPPER EXTREMITY: ICD-10-CM

## 2022-08-11 DIAGNOSIS — R29.3 POSTURE ABNORMALITY: ICD-10-CM

## 2022-08-11 PROCEDURE — 97110 THERAPEUTIC EXERCISES: CPT | Mod: PN,CQ

## 2022-08-11 PROCEDURE — 97140 MANUAL THERAPY 1/> REGIONS: CPT | Mod: PN,CQ

## 2022-08-11 NOTE — PROGRESS NOTES
"[facility]   Physical Therapy Treatment Note     Name: Etta Irwin  Clinic Number: [id number]    Therapy Diagnosis:   Encounter Diagnoses   Name Primary?    Decreased right shoulder range of motion Yes    Decreased strength of upper extremity     Posture abnormality      Physician: Dwain Galaviz MD    Visit Date: 8/11/2022    Physician Orders: PT Eval and Treat "Begin tonny PROM"  Medical Diagnosis from Referral: S42.201D (ICD-10-CM) - Closed fracture of proximal end of right humerus with routine healing, subsequent encounter  Evaluation Date: 6/20/2022  Authorization Period Expiration: 12/31/22  Plan of Care Expiration: 08/15/2022  Progress Note Due: 08/15/2022  Visit # / Visits authorized: 11/ 50   FOTO:  11/10  NEXT     Precautions: Standard     PTA Visit #: 2/5     Time In: 9:05 am  Time Out:11:00 am  Total Billable Time: 60 minutes- 3 TE, 1 MT    DOS: 4/28/22 ORIF of proximal humerus     SUBJECTIVE     Pt reports: no complaints of pain.  States her  put her hair in a ponytail this morning.  She was compliant with home exercise program.  Response to previous treatment: good   Functional change: able to use arm more can wash her hair    Pain: 0/10  Location: right shoulder     OBJECTIVE     Objective Measures updated at progress report unless specified.     Shoulder flexion following manual 145 prom    Treatment     Etta received the treatments listed below:     THERAPEUTIC EXERCISES to develop strength, endurance, ROM, flexibility, posture and core stabilization for 45 minutes including:    Bold= performed today  (+)= added/increased today    Pulleys flex, scaption x 2 min each  Supine chest press w orange, (3#) dowel 3x10  Supine shoulder flex w red (3#) dowel 5 sec hold x 20  Supine sh ER at 45 deg with towel 5 sec hold x 30  Sidelying external rotation +3x30  Sidelying abd +3x10   Sidelying horizontal abd +3x10  Post cap stretch across chest 3 x 30 " "sec  Bilateral GH external rotation with red band +3x10  Thread the needle in modified plantigrade 15x right  Sh flex walk outs w hand on elevated head of mat 5 sec hold x 20   Rows w GTB x 30  Straight arm pull-down + green band 2x10 reps  Wall slides w pillowcase 2 x 10 (10 with serratus focus, 10 range of motion) Out of time  R upper trap stretch w overpressure 3 x 30 sec  Post sh roll x 20- NP  Supine pec stretch in T position x 3 min- NP  Shoulder isometrics abd, external rotation, flex 10x5"  Seated sh IR w YTB w elbow propped on mat x 20 NP  internal rotation/ER walkouts red theraband R 1x15 each     Manual therapy techniques for 15 minutes including:  Grade 3 post and inf R GH joint mob  Passive range of motion right shoulder  Scapular mobilizations      Patient Education and Home Exercises     Home Exercises Provided and Patient Education Provided     Education provided:   - diagnosis/prognosis, goals for therapy, role of therapy for care, exercises/HEP     Written Home Exercises Provided: Patient instructed to cont prior HEP. 6/28/22. Exercises were reviewed and Etta was able to demonstrate them prior to the end of the session.  Etta demonstrated good  understanding of the education provided. See EMR under Patient Instructions for exercises provided during therapy sessions    ASSESSMENT     Etta presents s/p ORIF right proximal humerus fracture, week 12 of recovery. She tolerated today's session well including progressions as bolded   Added reps with good tolerance though was fatigued by activities. Pt gradually progressing shoulder range of motion and mobility. Continued cuing for shoulder elevation with exercises. No increased pain post treatment continue to progress as tolerated.     Etta Is progressing well towards her goals.   Pt prognosis is Good.     Pt will continue to benefit from skilled outpatient physical therapy to address the deficits listed in the problem list box on initial evaluation, " provide pt/family education and to maximize pt's level of independence in the home and community environment.     Pt's spiritual, cultural and educational needs considered and pt agreeable to plan of care and goals.     Anticipated barriers to physical therapy: co-morbidities, time since sx     Goals:  Short-Term Goals: 4 weeks  - The patient will be independent with initial home exercise program.  - The patient will demonstrate good unsupported sitting posture with min verbal cues for 15 minutes.  - The patient will increase ROM 15 degrees to perform bathing and hygiene, grooming, toileting and dressing with pain < 0/10. Met for scaption, external rotation. Not met for abd, internal rotation.  - The patient will increase strength by 1.2 muscle grade  to perform bathing and hygiene, grooming, toileting and dressing with pain < 0/10.     Long-Term Goals: 8 weeks  - The patient will be independent with home exercise program and symptom management.  - The patient will increase ROM = to uninvolved shoulder  to perform work duties and recreation/leisure activities   with pain < 0/10.  - The patient will increase strength = to uninvolved shoulder  to perform work duties and recreation/leisure activities   with pain < 0/10.    PLAN     Plan of care Certification: 6/20/2022 to 08/15/2022.     Outpatient Physical Therapy 2 times weekly for 8 weeks to include the following interventions: Aquatic Therapy, Manual Therapy, Moist Heat/ Ice, Neuromuscular Re-ed, Patient Education, Therapeutic Activities and Therapeutic Exercise.     Progress shoulder ROM and UE strengthening.    Yanira Hughes, PTA   8/11/2022

## 2022-08-16 ENCOUNTER — CLINICAL SUPPORT (OUTPATIENT)
Dept: REHABILITATION | Facility: HOSPITAL | Age: 62
End: 2022-08-16
Payer: COMMERCIAL

## 2022-08-16 DIAGNOSIS — R29.898 DECREASED STRENGTH OF UPPER EXTREMITY: ICD-10-CM

## 2022-08-16 DIAGNOSIS — M25.611 DECREASED RIGHT SHOULDER RANGE OF MOTION: Primary | ICD-10-CM

## 2022-08-16 DIAGNOSIS — R29.3 POSTURE ABNORMALITY: ICD-10-CM

## 2022-08-16 PROCEDURE — 97140 MANUAL THERAPY 1/> REGIONS: CPT | Mod: PN

## 2022-08-16 PROCEDURE — 97110 THERAPEUTIC EXERCISES: CPT | Mod: PN

## 2022-08-16 NOTE — PROGRESS NOTES
"[facility]   Physical Therapy Treatment Note     Name: Etta Irwin  Clinic Number: [id number]    Therapy Diagnosis:   Encounter Diagnoses   Name Primary?    Decreased right shoulder range of motion Yes    Decreased strength of upper extremity     Posture abnormality      Physician: Dwain Galaviz MD    Visit Date: 8/16/2022    Physician Orders: PT Eval and Treat "Begin tonny PROM"  Medical Diagnosis from Referral: S42.201D (ICD-10-CM) - Closed fracture of proximal end of right humerus with routine healing, subsequent encounter  Evaluation Date: 6/20/2022  Authorization Period Expiration: 12/31/22  Plan of Care Expiration:09/30/22  Progress Note Due: 9/16/22  Visit # / Visits authorized: 12/ 50   FOTO:  2nd complete 8/16/22     Precautions: Standard     PTA Visit #: 2/5     Time In: 11:05 am  Time Out:12:00 am  Total Billable Time: 55 minutes- 3 TE, 1 MT    DOS: 4/28/22 ORIF of proximal humerus     SUBJECTIVE     Pt reports: she feels very good, lifting arm getting better.  no complaints of pain.  Improved ability to do hair, reach into cabinet, lift.   She was compliant with home exercise program.  Response to previous treatment: good   Functional change: able to use arm more can wash her hair, reach into cabinet    Pain: 0/10  Location: right shoulder     OBJECTIVE     Posture:  Elevated right shoulder, B rounded shoulders, moderate FHP     Passive Range of Motion:   Shoulder Right   Scaption 110 deg * 140 with shoulder elevation    Abduction 90 deg *   ER at 90 60   IR 50 deg *     Active Range of Motion:   Shoulder Right Left   Flexion 90 deg * 165 deg   Abduction 50 deg * 168 deg   ER (behind head) occiput C6   IR (behind back) sacrum T10      Upper Extremity Strength:    Shoulder Left Right   Flexion 4/5 3+/5   Abduction 4/5 4-/5   ER 4/5 3/5   IR 4+5 4-/5         Joint Mobility: decreased posterior glide w/ humeral head sitting somewhat inferior      Palpation: no " "TTP         CMS Impairment/Limitation/Restriction for FOTO Upper Arm Survey     Therapist reviewed FOTO scores for Etta Irwin on 8/16/2022.   FOTO documents entered into EP[employer] - see Media section.     Limitation Score: 33%  Predicted Score: 27%           Treatment     Etta received the treatments listed below:     THERAPEUTIC EXERCISES to develop strength, endurance, ROM, flexibility, posture and core stabilization for 45 minutes including:  objective measures    Pulleys flex, scaption x 2 min each  Supine chest press w orange, (3#) dowel 3x10  Supine shoulder flex w red (3#) dowel 5 sec hold x 20  Sidelying external rotation 3x10  Sidelying abd 3x10   Sidelying horizontal abd 3x10  Post cap stretch across chest 3 x 30 sec  Sh flex walk outs w hand on elevated head of mat 5 sec hold x 20       Out of time  Bilateral GH external rotation with red band 3x10  Thread the needle in modified plantigrade 15x right  Rows w GTB x 30  Straight arm pull-down green band 2x10 reps  Wall slides w pillowcase 2 x 10 (10 with serratus focus, 10 range of motion) Out of time  R upper trap stretch w overpressure 3 x 30 sec  Post sh roll x 20- NP  Supine pec stretch in T position x 3 min- NP  Shoulder isometrics abd, external rotation, flex 10x5"  Seated sh IR w YTB w elbow propped on mat x 20 NP  internal rotation/ER walkouts red theraband R 1x15 each     Manual therapy techniques for 15 minutes including:  Grade 3 post and inf R GH joint mob  Passive range of motion right shoulder  Scapular mobilizations      Patient Education and Home Exercises     Home Exercises Provided and Patient Education Provided     Education provided:   - diagnosis/prognosis, goals for therapy, role of therapy for care, exercises/HEP     Written Home Exercises Provided: Patient instructed to cont prior HEP. 6/28/22. Exercises were reviewed and Etta was able to demonstrate them prior to the end of the session.  Etta demonstrated good  understanding " of the education provided. See EMR under Patient Instructions for exercises provided during therapy sessions    ASSESSMENT     Etta presents s/p ORIF right proximal humerus fracture, week 15 of recovery. Pt demonstrates gradual improvements in shoulder range of motion. She has ongoing joint mobility, range of motion, and strength deficits. Significant shoulder elevation noted with shoulder flexion. Pt with improving ability to reach into cabinets and doing hair. She will continue to benefit from skilled PT to address ongoing deficits and improve functional mobility and quality of life.       Etta Is progressing well towards her goals.   Pt prognosis is Good.     Pt will continue to benefit from skilled outpatient physical therapy to address the deficits listed in the problem list box on initial evaluation, provide pt/family education and to maximize pt's level of independence in the home and community environment.     Pt's spiritual, cultural and educational needs considered and pt agreeable to plan of care and goals.     Anticipated barriers to physical therapy: co-morbidities, time since sx     Goals:  Short-Term Goals: 4 weeks  - The patient will be independent with initial home exercise program. Met  - The patient will demonstrate good unsupported sitting posture with min verbal cues for 15 minutes. Progressing, not met  - The patient will increase ROM 15 degrees to perform bathing and hygiene, grooming, toileting and dressing with pain < 0/10. Met for scaption, external rotation. Not met for abd, internal rotation.  - The patient will increase strength by 1.2 muscle grade  to perform bathing and hygiene, grooming, toileting and dressing with pain < 0/10. Progressing, not met     Long-Term Goals: 8 weeks  - The patient will be independent with home exercise program and symptom management. Progressing, not met  - The patient will increase ROM = to uninvolved shoulder  to perform work duties and  recreation/leisure activities   with pain < 0/10. Progressing, not met  - The patient will increase strength = to uninvolved shoulder  to perform work duties and recreation/leisure activities   with pain < 0/10. Progressing, not met    PLAN     Plan of care Certification: 08/16/2022- 9/30/22     Outpatient Physical Therapy 2 times weekly for 6 weeks to include the following interventions: Aquatic Therapy, Manual Therapy, Moist Heat/ Ice, Neuromuscular Re-ed, Patient Education, Therapeutic Activities and Therapeutic Exercise.     Progress shoulder ROM and UE strengthening.    TRENA CESAR, PT   8/16/2022

## 2022-08-17 NOTE — PLAN OF CARE
"[facility]   Physical Therapy  plan of care      Name: Etta Irwin  Clinic Number: [id number]    Therapy Diagnosis:   Encounter Diagnoses   Name Primary?    Decreased right shoulder range of motion Yes    Decreased strength of upper extremity     Posture abnormality      Physician: Dwain Galaviz MD    Visit Date: 8/16/2022    Physician Orders: PT Eval and Treat "Begin tonny PROM"  Medical Diagnosis from Referral: S42.201D (ICD-10-CM) - Closed fracture of proximal end of right humerus with routine healing, subsequent encounter  Evaluation Date: 6/20/2022  Authorization Period Expiration: 12/31/22  Plan of Care Expiration:09/30/22  Progress Note Due: 9/16/22  Visit # / Visits authorized: 12/ 50   FOTO:  2nd complete 8/16/22     Precautions: Standard     PTA Visit #: 2/5     Time In: 11:05 am  Time Out:12:00 am  Total Billable Time: 55 minutes- 3 TE, 1 MT    DOS: 4/28/22 ORIF of proximal humerus     SUBJECTIVE     Pt reports: she feels very good, lifting arm getting better.  no complaints of pain.  Improved ability to do hair, reach into cabinet, lift.   She was compliant with home exercise program.  Response to previous treatment: good   Functional change: able to use arm more can wash her hair, reach into cabinet    Pain: 0/10  Location: right shoulder     OBJECTIVE     Posture:  Elevated right shoulder, B rounded shoulders, moderate FHP     Passive Range of Motion:   Shoulder Right   Scaption 110 deg * 140 with shoulder elevation    Abduction 90 deg *   ER at 90 60   IR 50 deg *     Active Range of Motion:   Shoulder Right Left   Flexion 90 deg * 165 deg   Abduction 50 deg * 168 deg   ER (behind head) occiput C6   IR (behind back) sacrum T10      Upper Extremity Strength:    Shoulder Left Right   Flexion 4/5 3+/5   Abduction 4/5 4-/5   ER 4/5 3/5   IR 4+5 4-/5         Joint Mobility: decreased posterior glide w/ humeral head sitting somewhat inferior      Palpation: no TTP "         CMS Impairment/Limitation/Restriction for FOTO Upper Arm Survey     Therapist reviewed FOTO scores for Etta Irwin on 8/16/2022.   FOTO documents entered into [employer] - see Media section.     Limitation Score: 33%  Predicted Score: 27%           Treatment     Etta received the treatments listed in daily treatment note    Patient Education and Home Exercises     Home Exercises Provided and Patient Education Provided     Education provided:   - diagnosis/prognosis, goals for therapy, role of therapy for care, exercises/HEP     Written Home Exercises Provided: Patient instructed to cont prior HEP. 6/28/22. Exercises were reviewed and Etta was able to demonstrate them prior to the end of the session.  Etta demonstrated good  understanding of the education provided. See EMR under Patient Instructions for exercises provided during therapy sessions    ASSESSMENT     Etta presents s/p ORIF right proximal humerus fracture, week 15 of recovery. Pt demonstrates gradual improvements in shoulder range of motion. She has ongoing joint mobility, range of motion, and strength deficits. Significant shoulder elevation noted with shoulder flexion. Pt with improving ability to reach into cabinets and doing hair. She will continue to benefit from skilled PT to address ongoing deficits and improve functional mobility and quality of life.       Etta Is progressing well towards her goals.   Pt prognosis is Good.     Pt will continue to benefit from skilled outpatient physical therapy to address the deficits listed in the problem list box on initial evaluation, provide pt/family education and to maximize pt's level of independence in the home and community environment.     Pt's spiritual, cultural and educational needs considered and pt agreeable to plan of care and goals.     Anticipated barriers to physical therapy: co-morbidities, time since sx     Goals:  Short-Term Goals: 4 weeks  - The patient will be independent  with initial home exercise program. Met  - The patient will demonstrate good unsupported sitting posture with min verbal cues for 15 minutes. Progressing, not met  - The patient will increase ROM 15 degrees to perform bathing and hygiene, grooming, toileting and dressing with pain < 0/10. Met for scaption, external rotation. Not met for abd, internal rotation.  - The patient will increase strength by 1.2 muscle grade  to perform bathing and hygiene, grooming, toileting and dressing with pain < 0/10. Progressing, not met     Long-Term Goals: 8 weeks  - The patient will be independent with home exercise program and symptom management. Progressing, not met  - The patient will increase ROM = to uninvolved shoulder  to perform work duties and recreation/leisure activities   with pain < 0/10. Progressing, not met  - The patient will increase strength = to uninvolved shoulder  to perform work duties and recreation/leisure activities   with pain < 0/10. Progressing, not met    PLAN     Plan of care Certification: 08/16/2022- 9/30/22     Outpatient Physical Therapy 2 times weekly for 6 weeks to include the following interventions: Aquatic Therapy, Manual Therapy, Moist Heat/ Ice, Neuromuscular Re-ed, Patient Education, Therapeutic Activities and Therapeutic Exercise.     Progress shoulder ROM and UE strengthening.    TRENA CESAR, PT   8/16/2022    I certify the need for these services furnished under this plan of treatment and while under my care.        Dwain Galaviz MD, FAAOS  , Orthopaedic Sports Medicine  Residency   [facility] Department of Orthopaedic Surgery  Assistant Orthopaedic Surgeon, [place] [facility]  Head Team Physician, [place] [facility]

## 2022-08-18 ENCOUNTER — CLINICAL SUPPORT (OUTPATIENT)
Dept: REHABILITATION | Facility: HOSPITAL | Age: 62
End: 2022-08-18
Payer: COMMERCIAL

## 2022-08-18 DIAGNOSIS — M25.611 DECREASED RIGHT SHOULDER RANGE OF MOTION: Primary | ICD-10-CM

## 2022-08-18 DIAGNOSIS — R29.898 DECREASED STRENGTH OF UPPER EXTREMITY: ICD-10-CM

## 2022-08-18 DIAGNOSIS — R29.3 POSTURE ABNORMALITY: ICD-10-CM

## 2022-08-18 PROCEDURE — 97140 MANUAL THERAPY 1/> REGIONS: CPT | Mod: PN,CQ

## 2022-08-18 PROCEDURE — 97110 THERAPEUTIC EXERCISES: CPT | Mod: PN,CQ

## 2022-08-18 NOTE — PROGRESS NOTES
"[facility]   Physical Therapy Treatment Note     Name: Etta Irwin  Clinic Number: [id number]    Therapy Diagnosis:   Encounter Diagnoses   Name Primary?    Decreased right shoulder range of motion Yes    Decreased strength of upper extremity     Posture abnormality      Physician: Dwain Galaviz MD    Visit Date: 8/18/2022    Physician Orders: PT Eval and Treat "Begin tonny PROM"  Medical Diagnosis from Referral: S42.201D (ICD-10-CM) - Closed fracture of proximal end of right humerus with routine healing, subsequent encounter  Evaluation Date: 6/20/2022  Authorization Period Expiration: 12/31/22  Plan of Care Expiration:09/30/22  Progress Note Due: 9/16/22  Visit # / Visits authorized: 13/ 50   FOTO: 13/18  2nd complete 8/16/22     Precautions: Standard     PTA Visit #: 1 /5     Time In: 11:05 am  Time Out:12:00 am  Total Billable Time: 55 minutes- 3 TE, 1 MT    DOS: 4/28/22 ORIF of proximal humerus     SUBJECTIVE     Pt reports:  no complaints of pain. Some soreness. Continued improvement with ability to do hair, reach into cabinet, lift. Feels she is ~ 60% improved since start of care.  She was compliant with home exercise program.  Response to previous treatment: good   Functional change: able to use arm more can wash her hair, reach into cabinet    Pain: 0/10  Location: right shoulder     OBJECTIVE     Objective measurements updated at progress report unless specified.    Treatment     Etta received the treatments listed below:     THERAPEUTIC EXERCISES to develop strength, endurance, ROM, flexibility, posture and core stabilization for 45 minutes including:  objective measures    UBE reverse 2' no resistance - NEXT?  Pulleys flex, scaption x 2 min each  Supine chest press w red, (3#) dowel 3x10  Supine shoulder flex w red (3#) dowel 5 sec hold x 20 + 10  Sidelying external rotation 3x10  Sidelying abd 3x10   Sidelying horizontal abd 3x10  Post cap stretch across " "chest 3 x 30 sec  Sh flex walk outs w hand on elevated head of mat 5 sec hold x 20   Bilateral GH external rotation with red band 3x10  Thread the needle in modified plantigrade 15x right  Shoulder isometrics abd, external rotation, flex 10x5"  Rows w GTB x 30  Straight arm pull-down green band 2x10 reps  internal rotation/ER walkouts red theraband R 1x15 each     Not performed  Wall slides w pillowcase 2 x 10 (10 with serratus focus, 10 range of motion) Out of time  R upper trap stretch w overpressure 3 x 30 sec  Post sh roll x 20- NP  Supine pec stretch in T position x 3 min- NP  Shoulder isometrics abd, external rotation, flex 10x5"  Seated sh IR w YTB w elbow propped on mat x 20 NP      Manual therapy techniques for 10 minutes including:  Grade 3 post and inf R GH joint mob  Passive range of motion right shoulder  Scapular mobilizations  STM/MFR along incision line and adjacent musculature      Patient Education and Home Exercises     Home Exercises Provided and Patient Education Provided     Education provided:   - diagnosis/prognosis, goals for therapy, role of therapy for care, exercises/HEP     Written Home Exercises Provided: Patient instructed to cont prior HEP. 6/28/22. Exercises were reviewed and Etta was able to demonstrate them prior to the end of the session.  Etta demonstrated good  understanding of the education provided. See EMR under Patient Instructions for exercises provided during therapy sessions    ASSESSMENT     Etta presents s/p ORIF right proximal humerus fracture, week 15 of recovery. Pt continues to demonstrate gradual improvements in shoulder range of motion. She has ongoing joint mobility, range of motion, and strength deficits. Significant shoulder elevation noted with shoulder flexion.required verbal cues to decrease compensation. Pt with improving ability to reach into cabinets and doing hair. She will continue to benefit from skilled PT to address ongoing deficits and improve " functional mobility and quality of life.       Etta Is progressing well towards her goals.   Pt prognosis is Good.     Pt will continue to benefit from skilled outpatient physical therapy to address the deficits listed in the problem list box on initial evaluation, provide pt/family education and to maximize pt's level of independence in the home and community environment.     Pt's spiritual, cultural and educational needs considered and pt agreeable to plan of care and goals.     Anticipated barriers to physical therapy: co-morbidities, time since sx     Goals:  Short-Term Goals: 4 weeks  - The patient will be independent with initial home exercise program. Met  - The patient will demonstrate good unsupported sitting posture with min verbal cues for 15 minutes. Progressing, not met  - The patient will increase ROM 15 degrees to perform bathing and hygiene, grooming, toileting and dressing with pain < 0/10. Met for scaption, external rotation. Not met for abd, internal rotation.  - The patient will increase strength by 1.2 muscle grade  to perform bathing and hygiene, grooming, toileting and dressing with pain < 0/10. Progressing, not met     Long-Term Goals: 8 weeks  - The patient will be independent with home exercise program and symptom management. Progressing, not met  - The patient will increase ROM = to uninvolved shoulder  to perform work duties and recreation/leisure activities   with pain < 0/10. Progressing, not met  - The patient will increase strength = to uninvolved shoulder  to perform work duties and recreation/leisure activities   with pain < 0/10. Progressing, not met    PLAN     Plan of care Certification: 08/16/2022- 9/30/22     Outpatient Physical Therapy 2 times weekly for 6 weeks to include the following interventions: Aquatic Therapy, Manual Therapy, Moist Heat/ Ice, Neuromuscular Re-ed, Patient Education, Therapeutic Activities and Therapeutic Exercise.     Progress shoulder ROM and UE  strengthening.    Yanira Hughes, PTA   8/18/2022

## 2022-08-23 ENCOUNTER — OFFICE VISIT (OUTPATIENT)
Dept: ORTHOPEDICS | Facility: CLINIC | Age: 62
End: 2022-08-23
Payer: COMMERCIAL

## 2022-08-23 ENCOUNTER — HOSPITAL ENCOUNTER (OUTPATIENT)
Dept: RADIOLOGY | Facility: HOSPITAL | Age: 62
Discharge: HOME | End: 2022-08-23
Attending: ORTHOPAEDIC SURGERY
Payer: COMMERCIAL

## 2022-08-23 ENCOUNTER — CLINICAL SUPPORT (OUTPATIENT)
Dept: REHABILITATION | Facility: HOSPITAL | Age: 62
End: 2022-08-23
Payer: COMMERCIAL

## 2022-08-23 VITALS
WEIGHT: 190.69 LBS | DIASTOLIC BLOOD PRESSURE: 68 MMHG | SYSTOLIC BLOOD PRESSURE: 105 MMHG | HEART RATE: 75 BPM | HEIGHT: 60 IN | BODY MASS INDEX: 37.44 KG/M2

## 2022-08-23 DIAGNOSIS — M25.611 DECREASED RIGHT SHOULDER RANGE OF MOTION: Primary | ICD-10-CM

## 2022-08-23 DIAGNOSIS — R29.898 DECREASED STRENGTH OF UPPER EXTREMITY: ICD-10-CM

## 2022-08-23 DIAGNOSIS — R29.3 POSTURE ABNORMALITY: ICD-10-CM

## 2022-08-23 DIAGNOSIS — S42.201D CLOSED FRACTURE OF PROXIMAL END OF RIGHT HUMERUS WITH ROUTINE HEALING, SUBSEQUENT ENCOUNTER: Primary | ICD-10-CM

## 2022-08-23 DIAGNOSIS — M25.511 RIGHT SHOULDER PAIN, UNSPECIFIED CHRONICITY: ICD-10-CM

## 2022-08-23 PROCEDURE — 97110 THERAPEUTIC EXERCISES: CPT | Mod: PN,CQ

## 2022-08-23 PROCEDURE — 99213 PR OFFICE/OUTPT VISIT, EST, LEVL III, 20-29 MIN: ICD-10-PCS | Mod: S$GLB,,, | Performed by: ORTHOPAEDIC SURGERY

## 2022-08-23 PROCEDURE — 1160F PR REVIEW ALL MEDS BY PRESCRIBER/CLIN PHARMACIST DOCUMENTED: ICD-10-PCS | Mod: CPTII,S$GLB,, | Performed by: ORTHOPAEDIC SURGERY

## 2022-08-23 PROCEDURE — 1159F PR MEDICATION LIST DOCUMENTED IN MEDICAL RECORD: ICD-10-PCS | Mod: CPTII,S$GLB,, | Performed by: ORTHOPAEDIC SURGERY

## 2022-08-23 PROCEDURE — 3008F BODY MASS INDEX DOCD: CPT | Mod: CPTII,S$GLB,, | Performed by: ORTHOPAEDIC SURGERY

## 2022-08-23 PROCEDURE — 3078F PR MOST RECENT DIASTOLIC BLOOD PRESSURE < 80 MM HG: ICD-10-PCS | Mod: CPTII,S$GLB,, | Performed by: ORTHOPAEDIC SURGERY

## 2022-08-23 PROCEDURE — 99999 PR PBB SHADOW E&M-EST. PATIENT-LVL III: ICD-10-PCS | Mod: PBBFAC,,, | Performed by: ORTHOPAEDIC SURGERY

## 2022-08-23 PROCEDURE — 99999 PR PBB SHADOW E&M-EST. PATIENT-LVL III: CPT | Mod: PBBFAC,,, | Performed by: ORTHOPAEDIC SURGERY

## 2022-08-23 PROCEDURE — 73030 X-RAY EXAM OF SHOULDER: CPT | Mod: 26,RT,, | Performed by: RADIOLOGY

## 2022-08-23 PROCEDURE — 3074F PR MOST RECENT SYSTOLIC BLOOD PRESSURE < 130 MM HG: ICD-10-PCS | Mod: CPTII,S$GLB,, | Performed by: ORTHOPAEDIC SURGERY

## 2022-08-23 PROCEDURE — 73030 X-RAY EXAM OF SHOULDER: CPT | Mod: TC,FY,RT

## 2022-08-23 PROCEDURE — 4010F PR ACE/ARB THEARPY RXD/TAKEN: ICD-10-PCS | Mod: CPTII,S$GLB,, | Performed by: ORTHOPAEDIC SURGERY

## 2022-08-23 PROCEDURE — 99213 OFFICE O/P EST LOW 20 MIN: CPT | Mod: S$GLB,,, | Performed by: ORTHOPAEDIC SURGERY

## 2022-08-23 PROCEDURE — 97140 MANUAL THERAPY 1/> REGIONS: CPT | Mod: PN,CQ

## 2022-08-23 PROCEDURE — 3078F DIAST BP <80 MM HG: CPT | Mod: CPTII,S$GLB,, | Performed by: ORTHOPAEDIC SURGERY

## 2022-08-23 PROCEDURE — 73030 XR SHOULDER COMPLETE 2 OR MORE VIEWS RIGHT: ICD-10-PCS | Mod: 26,RT,, | Performed by: RADIOLOGY

## 2022-08-23 PROCEDURE — 1159F MED LIST DOCD IN RCRD: CPT | Mod: CPTII,S$GLB,, | Performed by: ORTHOPAEDIC SURGERY

## 2022-08-23 PROCEDURE — 1160F RVW MEDS BY RX/DR IN RCRD: CPT | Mod: CPTII,S$GLB,, | Performed by: ORTHOPAEDIC SURGERY

## 2022-08-23 PROCEDURE — 4010F ACE/ARB THERAPY RXD/TAKEN: CPT | Mod: CPTII,S$GLB,, | Performed by: ORTHOPAEDIC SURGERY

## 2022-08-23 PROCEDURE — 3074F SYST BP LT 130 MM HG: CPT | Mod: CPTII,S$GLB,, | Performed by: ORTHOPAEDIC SURGERY

## 2022-08-23 PROCEDURE — 3008F PR BODY MASS INDEX (BMI) DOCUMENTED: ICD-10-PCS | Mod: CPTII,S$GLB,, | Performed by: ORTHOPAEDIC SURGERY

## 2022-08-23 NOTE — PROGRESS NOTES
Patient ID:   Etta Irwin is a 62 y.o. female.    Chief Complaint:   17 weeks s/p ORIF right proximal humerus fracture    HPI:   The patient is returning for evaluation of the right shoulder. She reports doing well but still has stiffness. She is pleased with her participation in PT.    Medications:    Current Outpatient Medications:     acetaminophen (TYLENOL) 500 MG tablet, Take 500 mg by mouth every 6 (six) hours as needed for Pain., Disp: , Rfl:     alendronate (FOSAMAX) 70 MG tablet, Take 70 mg by mouth every Thursday., Disp: , Rfl:     amitriptyline (ELAVIL) 50 MG tablet, Take 50 mg by mouth nightly., Disp: , Rfl:     amLODIPine (NORVASC) 5 MG tablet, Take 5 mg by mouth once daily., Disp: , Rfl:     aspirin 81 MG Chew, Take 1 tablet (81 mg total) by mouth once daily., Disp: , Rfl: 0    atorvastatin (LIPITOR) 40 MG tablet, Take 1 tablet by mouth every evening. , Disp: , Rfl:     escitalopram oxalate (LEXAPRO) 20 MG tablet, Take 1 tablet by mouth every evening. , Disp: , Rfl:     famotidine (PEPCID) 20 MG tablet, Take 1 tablet (20 mg total) by mouth 2 (two) times daily., Disp: 20 tablet, Rfl: 0    fish oil-omega-3 fatty acids 300-1,000 mg capsule, Take 2 g by mouth once daily., Disp: , Rfl:     levothyroxine (SYNTHROID) 50 MCG tablet, Take 50 mcg by mouth before breakfast., Disp: , Rfl: 0    lisinopril (PRINIVIL,ZESTRIL) 20 MG tablet, Take 5 mg by mouth once daily. , Disp: , Rfl:     metFORMIN (GLUCOPHAGE) 1000 MG tablet, Take 1,000 mg by mouth 2 (two) times daily with meals., Disp: , Rfl: 0    multivitamin capsule, Take 1 capsule by mouth once daily., Disp: , Rfl:     pioglitazone (ACTOS) 30 MG tablet, Take 30 mg by mouth once daily., Disp: , Rfl:     vitamin D (VITAMIN D3) 1000 units Tab, Take 1,000 Units by mouth once daily., Disp: , Rfl:     vitamin E 400 UNIT capsule, Take 400 Units by mouth once daily., Disp: , Rfl:     Allergies:  Review of patient's allergies indicates:  No Known  Allergies    Past Medical History:  Past Medical History:   Diagnosis Date    Depression     Diabetes mellitus     Hyperlipidemia     Hypertension     Osteopenia         Past Surgical History:  Past Surgical History:   Procedure Laterality Date     SECTION      CHOLECYSTECTOMY      Laparoscopic    COLONOSCOPY N/A 2022    Procedure: COLONOSCOPY;  Surgeon: Sebastián Vizcarra MD;  Location: [facility] ENDO;  Service: Endoscopy;  Laterality: N/A;    KNEE ARTHROPLASTY Left 2021    Procedure: ARTHROPLASTY, KNEE;  Surgeon: Jovi Valdovinos MD;  Location: [facility] OR;  Service: Orthopedics;  Laterality: Left;  Depuy notified 21 CC  Elías w/ Jomar confirmed 21 MN    OPEN REDUCTION AND INTERNAL FIXATION (ORIF) OF FRACTURE OF PROXIMAL HUMERUS Right 2022    Procedure: ORIF, FRACTURE, HUMERUS, PROXIMAL;  Surgeon: Dwain Galaviz MD;  Location: [facility] OR;  Service: Orthopedics;  Laterality: Right;  Synthes proximal humerus plates, beach chair position, open shoulder retractor set  Adonay confirmed 22 AM       Social History:  Social History     Occupational History    Not on file   Tobacco Use    Smoking status: Never Smoker    Smokeless tobacco: Never Used   Substance and Sexual Activity    Alcohol use: No    Drug use: No    Sexual activity: Yes     Partners: Male       Family History:  Family History   Problem Relation Age of Onset    Uterine cancer Mother         ROS:  Review of Systems   Musculoskeletal: Positive for joint pain and stiffness.   All other systems reviewed and are negative.      Vitals:  LMP  (LMP Unknown)     Physical Examination:  Comprehensive Orthopaedic Musculoskeletal Exam    General        Constitutional: appears stated age, moderately obese, well-developed and well-nourished    Scleral icterus: no    Labored breathing: no    Psychiatric: normal mood and affect and no acute distress    Neurological: alert and oriented x3    Skin: intact    Lymphadenopathy:  none     Ortho Exam     Right shoulder exam:  No cellulitis  ROM: active elevation 110, passive elevation 140, ER 10    Imaging:  I have ordered and independently reviewed the following imaging studies performed at [facility] today    Right shoulder XR series demonstrates post-op changes s/p ORIF proximal humerus. Some escape of the greater tuberosity present. No hardware failure.     Assessment:  1. Closed fracture of proximal end of right humerus with routine healing, subsequent encounter      Plan:  I have advised continued participation in PT to gain more ROM and strengthening. She has healed the fracture. At this point, she is pleased and will return as indicated.      No follow-ups on file.

## 2022-08-23 NOTE — PROGRESS NOTES
"[facility]   Physical Therapy Treatment Note     Name: Etta Roy  Clinic Number: [id number]    Therapy Diagnosis:   Encounter Diagnoses   Name Primary?    Decreased right shoulder range of motion Yes    Decreased strength of upper extremity     Posture abnormality      Physician: Dwain Galaviz MD    Visit Date: 8/23/2022    Physician Orders: PT Eval and Treat "Begin tonny PROM"  Medical Diagnosis from Referral: S42.201D (ICD-10-CM) - Closed fracture of proximal end of right humerus with routine healing, subsequent encounter  Evaluation Date: 6/20/2022  Authorization Period Expiration: 12/31/22  Plan of Care Expiration:09/30/22  Progress Note Due: 9/16/22  Visit # / Visits authorized: 14/ 50   FOTO: 14/18  2nd complete 8/16/22     Precautions: Standard     PTA Visit #: 2 /5     Time In: 9:00 am  Time Out:10:00 am  Total Billable Time: 60 minutes- 3 TE, 1 MT    DOS: 4/28/22 ORIF of proximal humerus     SUBJECTIVE     Pt reports:  no pain, less soreness. "I think I'm doing really good"  She was compliant with home exercise program.  Response to previous treatment: good   Functional change: able to use arm more can wash her hair, reach into cabinet    Pain: 0/10  Location: right shoulder     OBJECTIVE     Objective measurements updated at progress report unless specified.    Treatment     Etta received the treatments listed below:     THERAPEUTIC EXERCISES to develop strength, endurance, ROM, flexibility, posture and core stabilization for 45 minutes including:      UBE reverse 2' no resistance   Pulleys flex, scaption x 2 min each  Supine chest press w red, (3#) dowel 2x15  Supine shoulder flex w red (3#) dowel 5 sec hold 2x15  Sidelying external rotation 3x10  Sidelying abd 2x15   Sidelying horizontal abd 3x10  Post cap stretch across chest 3 x 30 sec  Sh flex walk outs w hand on elevated head of mat 5 sec hold x 20   Bilateral GH external rotation with red band " "3x10  Thread the needle in modified plantigrade 15x right  Shoulder isometrics abd, external rotation, flex 10x5"  Rows w GTB x 30  Straight arm pull-down green band 3x10 reps  internal rotation/ER walkouts red theraband R 2x10 each     Not performed  Wall slides w pillowcase 2 x 10 (10 with serratus focus, 10 range of motion) Out of time  R upper trap stretch w overpressure 3 x 30 sec  Post sh roll x 20- NP  Supine pec stretch in T position x 3 min- NP  Seated sh IR w YTB w elbow propped on mat x 20 NP      Manual therapy techniques for 10 minutes including:  Grade 3 post and inf R GH joint mob  Passive range of motion right shoulder  Scapular mobilizations  STM/MFR along incision line and adjacent musculature      Patient Education and Home Exercises     Home Exercises Provided and Patient Education Provided     Education provided:   - diagnosis/prognosis, goals for therapy, role of therapy for care, exercises/HEP     Written Home Exercises Provided: Patient instructed to cont prior HEP. 6/28/22. Exercises were reviewed and Etta was able to demonstrate them prior to the end of the session.  Etta demonstrated good  understanding of the education provided. See EMR under Patient Instructions for exercises provided during therapy sessions    ASSESSMENT     Etta presents s/p ORIF right proximal humerus fracture, week 16 of recovery. Pt continues to demonstrate gradual improvements in shoulder range of motion. She has ongoing joint mobility, range of motion, and strength deficits. Continued shoulder elevation noted with shoulder flexion.required verbal cues to decrease compensation.  Able to increase reps as bolded without complaint of increased pain.  Achieved appropriate level of fatigue - but states no pain.  Pt with improving ability to reach into cabinets and doing hair. She will continue to benefit from skilled PT to address ongoing deficits and improve functional mobility and quality of life.     Etta Is " progressing well towards her goals.   Pt prognosis is Good.     Pt will continue to benefit from skilled outpatient physical therapy to address the deficits listed in the problem list box on initial evaluation, provide pt/family education and to maximize pt's level of independence in the home and community environment.     Pt's spiritual, cultural and educational needs considered and pt agreeable to plan of care and goals.     Anticipated barriers to physical therapy: co-morbidities, time since sx     Goals:  Short-Term Goals: 4 weeks  - The patient will be independent with initial home exercise program. Met  - The patient will demonstrate good unsupported sitting posture with min verbal cues for 15 minutes. Progressing, not met  - The patient will increase ROM 15 degrees to perform bathing and hygiene, grooming, toileting and dressing with pain < 0/10. Met for scaption, external rotation. Not met for abd, internal rotation.  - The patient will increase strength by 1.2 muscle grade  to perform bathing and hygiene, grooming, toileting and dressing with pain < 0/10. Progressing, not met     Long-Term Goals: 8 weeks  - The patient will be independent with home exercise program and symptom management. Progressing, not met  - The patient will increase ROM = to uninvolved shoulder  to perform work duties and recreation/leisure activities   with pain < 0/10. Progressing, not met  - The patient will increase strength = to uninvolved shoulder  to perform work duties and recreation/leisure activities   with pain < 0/10. Progressing, not met    PLAN     Plan of care Certification: 08/16/2022- 9/30/22     Outpatient Physical Therapy 2 times weekly for 6 weeks to include the following interventions: Aquatic Therapy, Manual Therapy, Moist Heat/ Ice, Neuromuscular Re-ed, Patient Education, Therapeutic Activities and Therapeutic Exercise.     Progress shoulder ROM and UE strengthening.    Yanira Hughes, PTA   8/23/2022

## 2022-08-25 ENCOUNTER — CLINICAL SUPPORT (OUTPATIENT)
Dept: REHABILITATION | Facility: HOSPITAL | Age: 62
End: 2022-08-25
Payer: COMMERCIAL

## 2022-08-25 DIAGNOSIS — M25.611 DECREASED RIGHT SHOULDER RANGE OF MOTION: Primary | ICD-10-CM

## 2022-08-25 DIAGNOSIS — R29.898 DECREASED STRENGTH OF UPPER EXTREMITY: ICD-10-CM

## 2022-08-25 DIAGNOSIS — R29.3 POSTURE ABNORMALITY: ICD-10-CM

## 2022-08-25 PROCEDURE — 97140 MANUAL THERAPY 1/> REGIONS: CPT | Mod: PN

## 2022-08-25 PROCEDURE — 97110 THERAPEUTIC EXERCISES: CPT | Mod: PN

## 2022-08-25 NOTE — PROGRESS NOTES
"[facility]   Physical Therapy Treatment Note     Name: Etta Irwin  Clinic Number: [id number]    Therapy Diagnosis:   Encounter Diagnoses   Name Primary?    Decreased right shoulder range of motion Yes    Decreased strength of upper extremity     Posture abnormality      Physician: Dwain Galaviz MD    Visit Date: 8/25/2022    Physician Orders: PT Eval and Treat "Begin tonny PROM"  Medical Diagnosis from Referral: S42.201D (ICD-10-CM) - Closed fracture of proximal end of right humerus with routine healing, subsequent encounter  Evaluation Date: 6/20/2022  Authorization Period Expiration: 12/31/22  Plan of Care Expiration:09/30/22  Progress Note Due: 9/16/22  Visit # / Visits authorized: 15/ 50   FOTO: 15/18  2nd complete 8/16/22     Precautions: Standard     PTA Visit #: 2 /5     Time In: 8:00 am  Time Out:8:57 am  Total Billable Time: 60 minutes- 3 TE, 1 MT    DOS: 4/28/22 ORIF of proximal humerus     SUBJECTIVE     Pt reports:  no pain, less soreness. "I think I'm doing really good"  She was compliant with home exercise program.  Response to previous treatment: good   Functional change: able to use arm more can wash her hair, reach into cabinet    Pain: 0/10  Location: right shoulder     OBJECTIVE     Objective measurements updated at progress report unless specified.    Treatment     Etta received the treatments listed below:     THERAPEUTIC EXERCISES to develop strength, endurance, ROM, flexibility, posture and core stabilization for 47 minutes including:      UBE 2'/2' no resistance   Pulleys flex, scaption x 1 min each with inferior glide YSC 10x ea  Supine chest press w red, (3#) dowel 2x15  Supine shoulder flex w red (3#) dowel 5 sec hold 2x15  Eccentric internal rotation yellow theraband 10x -red next   Post cap stretch across chest 3 x 30 sec  sidelying trio (alternating external rotation, flexion, abd)   Rows w GTB x 30  Straight arm pull-down green band 3x10 " reps  Serratus slides towel 2x10  Sh flex walk outs w hand on elevated head of mat 5 sec hold x 20   internal rotation/ER walkouts Green theraband R 1x10 each     Not performed  R upper trap stretch w overpressure 3 x 30 sec  Post sh roll x 20- NP  Supine pec stretch in T position x 3 min- NP  Seated sh IR w YTB w elbow propped on mat x 20 NP  Bilateral GH external rotation with red band 3x10  Thread the needle in modified plantigrade 15x right      Manual therapy techniques for 10 minutes including:  Grade 3 post and inf R GH joint mob  Passive range of motion right shoulder  Scapular mobilizations  STM/MFR along incision line and adjacent musculature      Patient Education and Home Exercises     Home Exercises Provided and Patient Education Provided     Education provided:   - diagnosis/prognosis, goals for therapy, role of therapy for care, exercises/HEP     Written Home Exercises Provided: Patient instructed to cont prior HEP. 6/28/22. Exercises were reviewed and Etta was able to demonstrate them prior to the end of the session.  Etta demonstrated good  understanding of the education provided. See EMR under Patient Instructions for exercises provided during therapy sessions    ASSESSMENT     Etta presents s/p ORIF right proximal humerus fracture, week 17 of recovery. Pt tolerated today's session well. Continued to progress posterior cuff strengthening this visit and shoulder mobility. Pt with minimal complaints of pain. Pt required mod cuing for scapular elevation. No increased pain post treatment. Assess response to new exercises and progress as tolerated.       Etta Is progressing well towards her goals.   Pt prognosis is Good.     Pt will continue to benefit from skilled outpatient physical therapy to address the deficits listed in the problem list box on initial evaluation, provide pt/family education and to maximize pt's level of independence in the home and community environment.     Pt's spiritual,  cultural and educational needs considered and pt agreeable to plan of care and goals.     Anticipated barriers to physical therapy: co-morbidities, time since sx     Goals:  Short-Term Goals: 4 weeks  - The patient will be independent with initial home exercise program. Met  - The patient will demonstrate good unsupported sitting posture with min verbal cues for 15 minutes. Progressing, not met  - The patient will increase ROM 15 degrees to perform bathing and hygiene, grooming, toileting and dressing with pain < 0/10. Met for scaption, external rotation. Not met for abd, internal rotation.  - The patient will increase strength by 1.2 muscle grade  to perform bathing and hygiene, grooming, toileting and dressing with pain < 0/10. Progressing, not met     Long-Term Goals: 8 weeks  - The patient will be independent with home exercise program and symptom management. Progressing, not met  - The patient will increase ROM = to uninvolved shoulder  to perform work duties and recreation/leisure activities   with pain < 0/10. Progressing, not met  - The patient will increase strength = to uninvolved shoulder  to perform work duties and recreation/leisure activities   with pain < 0/10. Progressing, not met    PLAN     Plan of care Certification: 08/16/2022- 9/30/22     Outpatient Physical Therapy 2 times weekly for 6 weeks to include the following interventions: Aquatic Therapy, Manual Therapy, Moist Heat/ Ice, Neuromuscular Re-ed, Patient Education, Therapeutic Activities and Therapeutic Exercise.     Progress shoulder ROM and UE strengthening.    TRENA CESAR, PT   8/25/2022

## 2022-08-30 ENCOUNTER — CLINICAL SUPPORT (OUTPATIENT)
Dept: REHABILITATION | Facility: HOSPITAL | Age: 62
End: 2022-08-30
Payer: COMMERCIAL

## 2022-08-30 DIAGNOSIS — M25.611 DECREASED RIGHT SHOULDER RANGE OF MOTION: Primary | ICD-10-CM

## 2022-08-30 DIAGNOSIS — R29.898 DECREASED STRENGTH OF UPPER EXTREMITY: ICD-10-CM

## 2022-08-30 DIAGNOSIS — R29.3 POSTURE ABNORMALITY: ICD-10-CM

## 2022-08-30 PROCEDURE — 97140 MANUAL THERAPY 1/> REGIONS: CPT | Mod: PN

## 2022-08-30 PROCEDURE — 97110 THERAPEUTIC EXERCISES: CPT | Mod: PN

## 2022-08-30 NOTE — PROGRESS NOTES
"[facility]   Physical Therapy Treatment Note     Name: Etta Irwin  Clinic Number: [id number]    Therapy Diagnosis:   Encounter Diagnoses   Name Primary?    Decreased right shoulder range of motion Yes    Decreased strength of upper extremity     Posture abnormality        Physician: Dwain Galaviz MD    Visit Date: 8/30/2022    Physician Orders: PT Eval and Treat "Begin tonny PROM"  Medical Diagnosis from Referral: S42.201D (ICD-10-CM) - Closed fracture of proximal end of right humerus with routine healing, subsequent encounter  Evaluation Date: 6/20/2022  Authorization Period Expiration: 12/31/22  Plan of Care Expiration:09/30/22  Progress Note Due: 9/16/22  Visit # / Visits authorized: 16/ 50   FOTO: 16/18  2nd complete 8/16/22     Precautions: Standard     PTA Visit #: 2 /5     Time In: 10:00 am  Time Out:10:53 am  Total Billable Time: 53 minutes- 3 TE, 1 MT    DOS: 4/28/22 ORIF of proximal humerus     SUBJECTIVE     Pt reports:  no problems today, feeling good.   She was compliant with home exercise program.  Response to previous treatment: good   Functional change: able to use arm more can wash her hair, reach into cabinet    Pain: 0/10  Location: right shoulder     OBJECTIVE     Objective measurements updated at progress report unless specified.    Treatment     Etta received the treatments listed below:     THERAPEUTIC EXERCISES to develop strength, endurance, ROM, flexibility, posture and core stabilization for 43 minutes including:      UBE 2'/2' no resistance Not available  Pulleys flex, scaption x 1 min each (without inferior glide YSC 10x ea)  Supine chest press w red, (4#) dowel 2x15  Supine shoulder flex w red (4#) dowel 5 sec hold 2x10  Eccentric internal rotation red theraband 10x   Post cap stretch across chest 3 x 30 sec  sidelying trio (alternating external rotation, flexion, abd) 10x   Rows w GTB x 30  Straight arm pull-down green band 3x10 " "reps  Serratus slides towel 2x10  internal rotation/ER walkouts Green theraband R 1x10 each   Wall slides with thoracic extensions 10x 5"  Dowel assisted internal rotation / external rotation 20x ea    Not performed  R upper trap stretch w overpressure 3 x 30 sec  Post sh roll x 20- NP  Supine pec stretch in T position x 3 min- NP  Seated sh IR w YTB w elbow propped on mat x 20 NP  Bilateral GH external rotation with red band 3x10  Thread the needle in modified plantigrade 15x right      Manual therapy techniques for 10 minutes including:  Grade 3 post and inf R GH joint mob  Passive range of motion right shoulder  Scapular mobilizations  STM/MFR along incision line and adjacent musculature      Patient Education and Home Exercises     Home Exercises Provided and Patient Education Provided     Education provided:   - diagnosis/prognosis, goals for therapy, role of therapy for care, exercises/HEP     Written Home Exercises Provided: Patient instructed to cont prior HEP. 6/28/22. Exercises were reviewed and Etta was able to demonstrate them prior to the end of the session.  Etta demonstrated good  understanding of the education provided. See EMR under Patient Instructions for exercises provided during therapy sessions    ASSESSMENT     Etta presents s/p ORIF right proximal humerus fracture, week 18 of recovery. Pt with well controlled pain but with ongoing mobility and strength deficits. Worked on rotator cuff strengthening program with good tolerance. Progressed range of motion stretching without increased pain. Pt fatigues easily with exercises requiring occasional rest breaks. No increased pain reported post treatment. Continue to progress as tolerated.     Etta Is progressing well towards her goals.   Pt prognosis is Good.     Pt will continue to benefit from skilled outpatient physical therapy to address the deficits listed in the problem list box on initial evaluation, provide pt/family education and to " maximize pt's level of independence in the home and community environment.     Pt's spiritual, cultural and educational needs considered and pt agreeable to plan of care and goals.     Anticipated barriers to physical therapy: co-morbidities, time since sx     Goals:  Short-Term Goals: 4 weeks  - The patient will be independent with initial home exercise program. Met  - The patient will demonstrate good unsupported sitting posture with min verbal cues for 15 minutes. Progressing, not met  - The patient will increase ROM 15 degrees to perform bathing and hygiene, grooming, toileting and dressing with pain < 0/10. Met for scaption, external rotation. Not met for abd, internal rotation.  - The patient will increase strength by 1.2 muscle grade  to perform bathing and hygiene, grooming, toileting and dressing with pain < 0/10. Progressing, not met     Long-Term Goals: 8 weeks  - The patient will be independent with home exercise program and symptom management. Progressing, not met  - The patient will increase ROM = to uninvolved shoulder  to perform work duties and recreation/leisure activities   with pain < 0/10. Progressing, not met  - The patient will increase strength = to uninvolved shoulder  to perform work duties and recreation/leisure activities   with pain < 0/10. Progressing, not met    PLAN     Plan of care Certification: 08/16/2022- 9/30/22     Outpatient Physical Therapy 2 times weekly for 6 weeks to include the following interventions: Aquatic Therapy, Manual Therapy, Moist Heat/ Ice, Neuromuscular Re-ed, Patient Education, Therapeutic Activities and Therapeutic Exercise.     Progress shoulder ROM and UE strengthening.    TRENA CESAR, PT   8/30/2022

## 2022-09-01 ENCOUNTER — CLINICAL SUPPORT (OUTPATIENT)
Dept: REHABILITATION | Facility: HOSPITAL | Age: 62
End: 2022-09-01
Payer: COMMERCIAL

## 2022-09-01 DIAGNOSIS — R29.898 DECREASED STRENGTH OF UPPER EXTREMITY: ICD-10-CM

## 2022-09-01 DIAGNOSIS — R29.3 POSTURE ABNORMALITY: ICD-10-CM

## 2022-09-01 DIAGNOSIS — M25.611 DECREASED RIGHT SHOULDER RANGE OF MOTION: Primary | ICD-10-CM

## 2022-09-01 PROCEDURE — 97110 THERAPEUTIC EXERCISES: CPT | Mod: PN,CQ

## 2022-09-01 PROCEDURE — 97140 MANUAL THERAPY 1/> REGIONS: CPT | Mod: PN,CQ

## 2022-09-01 NOTE — PROGRESS NOTES
"[facility]   Physical Therapy Treatment Note     Name: Etta Irwin  Clinic Number: [id number]    Therapy Diagnosis:   Encounter Diagnoses   Name Primary?    Decreased right shoulder range of motion Yes    Decreased strength of upper extremity     Posture abnormality        Physician: Dwain Galaviz MD    Visit Date: 9/1/2022    Physician Orders: PT Eval and Treat "Begin tonny PROM"  Medical Diagnosis from Referral: S42.201D (ICD-10-CM) - Closed fracture of proximal end of right humerus with routine healing, subsequent encounter  Evaluation Date: 6/20/2022  Authorization Period Expiration: 12/31/22  Plan of Care Expiration:09/30/22  Progress Note Due: 9/16/22  Visit # / Visits authorized: 17/ 50   FOTO: 17/18  2nd complete 8/16/22     Precautions: Standard     PTA Visit #: 2 /5     Time In: 10:00 am  Time Out:10:55 am  Total Billable Time: 55 minutes- 3 TE, 1 MT    DOS: 4/28/22 ORIF of proximal humerus     SUBJECTIVE     Pt reports: "feeling good - I feel like I'm getting there."  States she was able to fix her hair into a ponytail this morning.  She was compliant with home exercise program.  Response to previous treatment: good   Functional change: able to use arm more fixed her hair in to a ponytail for the first time    Pain: 0/10  Location: right shoulder     OBJECTIVE     Objective measurements updated at progress report unless specified.    Treatment     Etta received the treatments listed below:     THERAPEUTIC EXERCISES to develop strength, endurance, ROM, flexibility, posture and core stabilization for 45 minutes including:      UBE 2'/2' no resistance Not available  Pulleys flex, scaption x 1 min each (without inferior glide YSC 10x ea)  Supine chest press w red, (4#) dowel 2x15  Supine shoulder flex w red (4#) dowel 5 sec hold 2x10  Eccentric internal rotation red theraband 10x + 5 In sidelying with therapist holding resistance.  Post cap stretch across chest 3 x " "30 sec  sidelying trio (alternating external rotation, flexion, abd) 10x   Rows w GTB 2x15  Straight arm pull-down green band 3x10 reps  Serratus slides towel 2x10  internal rotation/ER walkouts Green theraband R 1x10 each   Wall slides with thoracic extensions 10x 5"  Dowel assisted internal rotation / external rotation 20x ea    Not performed  R upper trap stretch w overpressure 3 x 30 sec  Post sh roll x 20- NP  Supine pec stretch in T position x 3 min- NP  Seated sh IR w YTB w elbow propped on mat x 20 NP  Bilateral GH external rotation with red band 3x10  Thread the needle in modified plantigrade 15x right      Manual therapy techniques for 10 minutes including:  Grade 3 post and inf R GH joint mob  Passive range of motion right shoulder  Scapular mobilizations  STM/MFR along incision line and adjacent musculature      Patient Education and Home Exercises     Home Exercises Provided and Patient Education Provided     Education provided:   - diagnosis/prognosis, goals for therapy, role of therapy for care, exercises/HEP     Written Home Exercises Provided: Patient instructed to cont prior HEP. 6/28/22. Exercises were reviewed and Etta was able to demonstrate them prior to the end of the session.  Etta demonstrated good  understanding of the education provided. See EMR under Patient Instructions for exercises provided during therapy sessions    ASSESSMENT     Etta presents s/p ORIF right proximal humerus fracture, week 18 of recovery. Pt with well controlled pain but with ongoing mobility and strength deficits. Continued rotator cuff strengthening program with good tolerance. Progressed range of motion stretching without increased pain. Pt fatigues easily with exercises but requiring less rest breaks today. No increased pain reported post treatment. Subjective reports of functional improvement. Continue to progress as tolerated.     Etta Is progressing well towards her goals.   Pt prognosis is Good.     Pt " will continue to benefit from skilled outpatient physical therapy to address the deficits listed in the problem list box on initial evaluation, provide pt/family education and to maximize pt's level of independence in the home and community environment.     Pt's spiritual, cultural and educational needs considered and pt agreeable to plan of care and goals.     Anticipated barriers to physical therapy: co-morbidities, time since sx     Goals:  Short-Term Goals: 4 weeks  - The patient will be independent with initial home exercise program. Met  - The patient will demonstrate good unsupported sitting posture with min verbal cues for 15 minutes. Progressing, not met  - The patient will increase ROM 15 degrees to perform bathing and hygiene, grooming, toileting and dressing with pain < 0/10. Met for scaption, external rotation. Not met for abd, internal rotation.  - The patient will increase strength by 1.2 muscle grade  to perform bathing and hygiene, grooming, toileting and dressing with pain < 0/10. Progressing, not met     Long-Term Goals: 8 weeks  - The patient will be independent with home exercise program and symptom management. Progressing, not met  - The patient will increase ROM = to uninvolved shoulder  to perform work duties and recreation/leisure activities   with pain < 0/10. Progressing, not met  - The patient will increase strength = to uninvolved shoulder  to perform work duties and recreation/leisure activities   with pain < 0/10. Progressing, not met    PLAN     Plan of care Certification: 08/16/2022- 9/30/22     Outpatient Physical Therapy 2 times weekly for 6 weeks to include the following interventions: Aquatic Therapy, Manual Therapy, Moist Heat/ Ice, Neuromuscular Re-ed, Patient Education, Therapeutic Activities and Therapeutic Exercise.     Progress shoulder ROM and UE strengthening.    Yanira Hughes, PTA   9/1/2022

## 2022-09-06 ENCOUNTER — CLINICAL SUPPORT (OUTPATIENT)
Dept: REHABILITATION | Facility: HOSPITAL | Age: 62
End: 2022-09-06
Payer: COMMERCIAL

## 2022-09-06 DIAGNOSIS — R29.898 DECREASED STRENGTH OF UPPER EXTREMITY: ICD-10-CM

## 2022-09-06 DIAGNOSIS — M25.611 DECREASED RIGHT SHOULDER RANGE OF MOTION: Primary | ICD-10-CM

## 2022-09-06 DIAGNOSIS — R29.3 POSTURE ABNORMALITY: ICD-10-CM

## 2022-09-06 PROCEDURE — 97140 MANUAL THERAPY 1/> REGIONS: CPT | Mod: PN

## 2022-09-06 PROCEDURE — 97110 THERAPEUTIC EXERCISES: CPT | Mod: PN

## 2022-09-06 NOTE — PROGRESS NOTES
"[facility]   Physical Therapy Treatment Note     Name: Etta Irwin  Clinic Number: [id number]    Therapy Diagnosis:   Encounter Diagnoses   Name Primary?    Decreased right shoulder range of motion Yes    Decreased strength of upper extremity     Posture abnormality        Physician: Dwain Galaviz MD    Visit Date: 9/6/2022    Physician Orders: PT Eval and Treat "Begin tonny PROM"  Medical Diagnosis from Referral: S42.201D (ICD-10-CM) - Closed fracture of proximal end of right humerus with routine healing, subsequent encounter  Evaluation Date: 6/20/2022  Authorization Period Expiration: 12/31/22  Plan of Care Expiration:09/30/22  Progress Note Due: 9/16/22  Visit # / Visits authorized: 18/ 50   FOTO: 18/18  2nd complete 8/16/22     Precautions: Standard     PTA Visit #: 2 /5     Time In: 10:00 am  Time Out:10:55am  Total Billable Time: 55 minutes- 3 TE, 1 MT    DOS: 4/28/22 ORIF of proximal humerus     SUBJECTIVE     Pt reports: no pain today. Feels progress with doing hair.   She was compliant with home exercise program.  Response to previous treatment: good   Functional change: able to use arm more fixed her hair in to a ponytail for the first time    Pain: 0/10  Location: right shoulder     OBJECTIVE     Objective measurements updated at progress report unless specified.    Treatment     Etta received the treatments listed below:     THERAPEUTIC EXERCISES to develop strength, endurance, ROM, flexibility, posture and core stabilization for 45 minutes including:      UBE 2'/2' no resistance   Pulleys flex, scaption x 1 min each (without inferior glide YSC 10x ea)NP  Supine chest press w red, (4#) dowel 2x15  Supine shoulder flex w red (4#) dowel 5 sec hold 2x10  Shoulder flexion vertical dowel 2# 15x  Sidelying external rotation 1# 3x10  Sidelying Horizontal abd 2x10   Seated 90/90 external rotation elbow on table 3x10  Shoulder taps 2x10  Post cap stretch across chest 3 " "x 30 sec  sidelying trio (flexion, abd; external rotation separately) 10x ea   Rows w GTB 2x15  Straight arm pull-down green band 3x10 reps  Serratus slides towel 1x10  internal rotation/ER walkouts Green theraband R 1x10 each NP  Wall slides with thoracic extensions 10x 5"      Not performed  Eccentric internal rotation red theraband 10x + 5 In sidelying with therapist holding resistance.  Dowel assisted internal rotation / external rotation 20x ea  R upper trap stretch w overpressure 3 x 30 sec  Supine pec stretch in T position x 3 min- NP      Manual therapy techniques for 10 minutes including:  Grade 3 post and inf R GH joint mob  Passive range of motion right shoulder  Scapular mobilizations  STM/MFR along incision line and adjacent musculature      Patient Education and Home Exercises     Home Exercises Provided and Patient Education Provided     Education provided:   - diagnosis/prognosis, goals for therapy, role of therapy for care, exercises/HEP     Written Home Exercises Provided: Patient instructed to cont prior HEP. 6/28/22. Exercises were reviewed and Etta was able to demonstrate them prior to the end of the session.  Etta demonstrated good  understanding of the education provided. See EMR under Patient Instructions for exercises provided during therapy sessions    ASSESSMENT     Etta presents s/p ORIF right proximal humerus fracture, week 19 of recovery. Pt with minimal pain complaints. Focus of treatment working on end range of motion stretching and strengthening. Pt tolerated RTC strength and stabilization exercise well. Occasional cuing for shoulder elevation compensation and anterior tilting. She will continue to benefit from progressive strengthening. No increased pain post treatment and pt states she feels she is doing very good.     Etta Is progressing well towards her goals.   Pt prognosis is Good.     Pt will continue to benefit from skilled outpatient physical therapy to address the " deficits listed in the problem list box on initial evaluation, provide pt/family education and to maximize pt's level of independence in the home and community environment.     Pt's spiritual, cultural and educational needs considered and pt agreeable to plan of care and goals.     Anticipated barriers to physical therapy: co-morbidities, time since sx     Goals:  Short-Term Goals: 4 weeks  - The patient will be independent with initial home exercise program. Met  - The patient will demonstrate good unsupported sitting posture with min verbal cues for 15 minutes. Progressing, not met  - The patient will increase ROM 15 degrees to perform bathing and hygiene, grooming, toileting and dressing with pain < 0/10. Met for scaption, external rotation. Not met for abd, internal rotation.  - The patient will increase strength by 1.2 muscle grade  to perform bathing and hygiene, grooming, toileting and dressing with pain < 0/10. Progressing, not met     Long-Term Goals: 8 weeks  - The patient will be independent with home exercise program and symptom management. Progressing, not met  - The patient will increase ROM = to uninvolved shoulder  to perform work duties and recreation/leisure activities   with pain < 0/10. Progressing, not met  - The patient will increase strength = to uninvolved shoulder  to perform work duties and recreation/leisure activities   with pain < 0/10. Progressing, not met    PLAN     Plan of care Certification: 08/16/2022- 9/30/22     Outpatient Physical Therapy 2 times weekly for 6 weeks to include the following interventions: Aquatic Therapy, Manual Therapy, Moist Heat/ Ice, Neuromuscular Re-ed, Patient Education, Therapeutic Activities and Therapeutic Exercise.     Progress shoulder ROM and UE strengthening.    TRENA CESAR, PT   9/6/2022

## 2022-09-08 ENCOUNTER — CLINICAL SUPPORT (OUTPATIENT)
Dept: REHABILITATION | Facility: HOSPITAL | Age: 62
End: 2022-09-08
Payer: COMMERCIAL

## 2022-09-08 DIAGNOSIS — R29.898 DECREASED STRENGTH OF UPPER EXTREMITY: ICD-10-CM

## 2022-09-08 DIAGNOSIS — R29.3 POSTURE ABNORMALITY: ICD-10-CM

## 2022-09-08 DIAGNOSIS — M25.611 DECREASED RIGHT SHOULDER RANGE OF MOTION: Primary | ICD-10-CM

## 2022-09-08 PROCEDURE — 97110 THERAPEUTIC EXERCISES: CPT | Mod: PN,CQ

## 2022-09-08 NOTE — PROGRESS NOTES
"[facility]   Physical Therapy Treatment Note     Name: Etta Roy  Clinic Number: [id number]    Therapy Diagnosis:   Encounter Diagnoses   Name Primary?    Decreased right shoulder range of motion Yes    Decreased strength of upper extremity     Posture abnormality        Physician: Dwain Galaviz MD    Visit Date: 9/8/2022    Physician Orders: PT Eval and Treat "Begin tonny PROM"  Medical Diagnosis from Referral: S42.201D (ICD-10-CM) - Closed fracture of proximal end of right humerus with routine healing, subsequent encounter  Evaluation Date: 6/20/2022  Authorization Period Expiration: 12/31/22  Plan of Care Expiration:09/30/22  Progress Note Due: 9/16/22  Visit # / Visits authorized: 19/ 50   FOTO: 19/18  2nd complete 8/16/22     Precautions: Standard     PTA Visit #: 2 /5     Time In: 11:00 am  Time Out:11:45am  Total Billable Time: 45 minutes- 3 TE, 1 MT    DOS: 4/28/22 ORIF of proximal humerus     SUBJECTIVE     Pt reports: no pain upon arrival and nothing new to update.   She was compliant with home exercise program.  Response to previous treatment: good   Functional change: able to use arm more fixed her hair in to a ponytail for the first time    Pain: 0/10  Location: right shoulder     OBJECTIVE     Objective measurements updated at progress report unless specified.    Treatment     Etta received the treatments listed below:     THERAPEUTIC EXERCISES to develop strength, endurance, ROM, flexibility, posture and core stabilization for 45 minutes including:      UBE 2'/2' no resistance   Pulleys flex, scaption x 1 min each (without inferior glide YSC 10x ea)  Supine chest press w green, (4#) dowel 2x15  Supine shoulder flex w green (4#) dowel 5 sec hold 2x10  Shoulder flexion vertical dowel 2# 2x15  Sidelying external rotation 1# 2x15  Sidelying Horizontal abduction 1# 2x15   Seated 90/90 external rotation elbow on table 1# 3x10   Shoulder taps 2x10  Post cap " "stretch across chest 3 x 30 sec  sidelying trio (flexion, abd; external rotation separately) 10x ea   Rows w GTB 2x15  Straight arm pull-down green band 3x10 reps  Serratus slides towel 1x10  internal rotation/ER walkouts Green theraband R 1x10 each NP  Wall slides with thoracic extensions 10x 5"      Not performed  Eccentric internal rotation red theraband 10x + 5 In sidelying with therapist holding resistance.  Dowel assisted internal rotation / external rotation 20x ea  R upper trap stretch w overpressure 3 x 30 sec  Supine pec stretch in T position x 3 min- NP      Manual therapy techniques for 00 minutes including:  Not performed today:  Grade 3 post and inf R GH joint mob  Passive range of motion right shoulder  Scapular mobilizations  STM/MFR along incision line and adjacent musculature    Patient Education and Home Exercises     Home Exercises Provided and Patient Education Provided     Education provided:   - diagnosis/prognosis, goals for therapy, role of therapy for care, exercises/HEP     Written Home Exercises Provided: Patient instructed to cont prior HEP. 6/28/22. Exercises were reviewed and Etta was able to demonstrate them prior to the end of the session.  Etta demonstrated good  understanding of the education provided. See EMR under Patient Instructions for exercises provided during therapy sessions    ASSESSMENT     Etta presents s/p ORIF right proximal humerus fracture, week 19 of recovery. She arrived to session without any complaints of pain and was agreeable to treatment.  Session consisted of shoulder strengthening and stability exercises with a focus on end range motion.  She tolerated session well and only required minimal verbal cuing to decrease shoulder elevation.  No increased pain post treatment and pt states she feels she is doing very good.     Etta Is progressing well towards her goals.   Pt prognosis is Good.     Pt will continue to benefit from skilled outpatient physical " therapy to address the deficits listed in the problem list box on initial evaluation, provide pt/family education and to maximize pt's level of independence in the home and community environment.     Pt's spiritual, cultural and educational needs considered and pt agreeable to plan of care and goals.     Anticipated barriers to physical therapy: co-morbidities, time since sx     Goals:  Short-Term Goals: 4 weeks  - The patient will be independent with initial home exercise program. Met  - The patient will demonstrate good unsupported sitting posture with min verbal cues for 15 minutes. Progressing, not met  - The patient will increase ROM 15 degrees to perform bathing and hygiene, grooming, toileting and dressing with pain < 0/10. Met for scaption, external rotation. Not met for abd, internal rotation.  - The patient will increase strength by 1.2 muscle grade  to perform bathing and hygiene, grooming, toileting and dressing with pain < 0/10. Progressing, not met     Long-Term Goals: 8 weeks  - The patient will be independent with home exercise program and symptom management. Progressing, not met  - The patient will increase ROM = to uninvolved shoulder  to perform work duties and recreation/leisure activities   with pain < 0/10. Progressing, not met  - The patient will increase strength = to uninvolved shoulder  to perform work duties and recreation/leisure activities   with pain < 0/10. Progressing, not met    PLAN     Plan of care Certification: 08/16/2022- 9/30/22     Outpatient Physical Therapy 2 times weekly for 6 weeks to include the following interventions: Aquatic Therapy, Manual Therapy, Moist Heat/ Ice, Neuromuscular Re-ed, Patient Education, Therapeutic Activities and Therapeutic Exercise.     Progress shoulder ROM and UE strengthening.    Sera Yanes, PTA   9/8/2022

## 2022-09-09 ENCOUNTER — TELEPHONE (OUTPATIENT)
Dept: ORTHOPEDICS | Facility: CLINIC | Age: 62
End: 2022-09-09
Payer: COMMERCIAL

## 2022-09-09 NOTE — TELEPHONE ENCOUNTER
----- Message from Dwain Galaviz MD sent at 9/9/2022  8:14 AM CDT -----  Regarding: RE: surgery  Contact: [phone]  I have no control over that - she will need to be put in touch with [facility] billing and/or insurance company      ----- Message -----  From: Hola Thornton MA  Sent: 9/8/2022   1:56 PM CDT  To: Dwain Galaviz MD  Subject: FW: surgery                                        ----- Message -----  From: Bertha Morgan  Sent: 9/8/2022  11:51 AM CDT  To: Anastacia Prakash Staff  Subject: surgery                                          Patient is requesting a call back regarding her insurance denied paying for her shoulder surgery.   Would the patient rather a call back or a response via [facility]?  call  Best Call Back Number:  [phone]   Additional Information:

## 2022-09-20 ENCOUNTER — CLINICAL SUPPORT (OUTPATIENT)
Dept: REHABILITATION | Facility: HOSPITAL | Age: 62
End: 2022-09-20
Payer: COMMERCIAL

## 2022-09-20 DIAGNOSIS — R29.3 POSTURE ABNORMALITY: ICD-10-CM

## 2022-09-20 DIAGNOSIS — M25.611 DECREASED RIGHT SHOULDER RANGE OF MOTION: Primary | ICD-10-CM

## 2022-09-20 DIAGNOSIS — R29.898 DECREASED STRENGTH OF UPPER EXTREMITY: ICD-10-CM

## 2022-09-20 PROCEDURE — 97110 THERAPEUTIC EXERCISES: CPT | Mod: PN

## 2022-09-20 PROCEDURE — 97140 MANUAL THERAPY 1/> REGIONS: CPT | Mod: PN

## 2022-09-20 NOTE — PROGRESS NOTES
"[facility]   Physical Therapy Treatment Note     Name: Etta Roy  Clinic Number: [id number]    Therapy Diagnosis:   Encounter Diagnoses   Name Primary?    Decreased right shoulder range of motion Yes    Decreased strength of upper extremity     Posture abnormality        Physician: Dwain Galaviz MD    Visit Date: 9/20/2022    Physician Orders: PT Eval and Treat "Begin tonny PROM"  Medical Diagnosis from Referral: S42.201D (ICD-10-CM) - Closed fracture of proximal end of right humerus with routine healing, subsequent encounter  Evaluation Date: 6/20/2022  Authorization Period Expiration: 12/31/22  Plan of Care Expiration:09/30/22  Progress Note Due: 9/16/22  Visit # / Visits authorized: 20/ 50   FOTO: 19/18  2nd complete 8/16/22     Precautions: Standard     PTA Visit #: 2 /5     Time In: 11:00 am  Time Out:11:55am  Total Billable Time: 55 minutes- 3 TE, 1 MT    DOS: 4/28/22 ORIF of proximal humerus     SUBJECTIVE     Pt reports: no pain upon arrival.    She was compliant with home exercise program.  Response to previous treatment: good   Functional change: able to use arm more fixed her hair in to a ponytail for the first time    Pain: 0/10  Location: right shoulder     OBJECTIVE     Objective measurements updated at progress report unless specified.    Treatment     Etta received the treatments listed below:     THERAPEUTIC EXERCISES to develop strength, endurance, ROM, flexibility, posture and core stabilization for 45 minutes including:      UBE 2'/2' no resistance   Pulleys flex, scaption x 1 min each (without inferior glide YSC 10x ea)NP  Supine chest press w green, (4#) dowel 2x15  Supine shoulder flex w green (4#) dowel 5 sec hold 2x10  Shoulder flexion vertical dowel 3# 2x10  Sidelying external rotation 1# 2x15  Sidelying Horizontal abduction 1# 2x15   Beach ball press with shoulder flexion 2x10   Seated 90/90 external rotation elbow on table (1# not today) 3x10 " "  Shoulder taps 2x10  Bent over rows 3# 2x10  Post cap stretch across chest 3 x 30 sec  Rows w GTB 2x15  Straight arm pull-down green band 3x10 reps  Standing shoulder flexion with external rotation yellow theraband 2x8  Serratus slides towel 1x15  internal rotation/ER walkouts Green theraband R 1x10 each   Wall slides with thoracic extensions 10x 5"      Not performed  Eccentric internal rotation red theraband 10x + 5 In sidelying with therapist holding resistance.  Dowel assisted internal rotation / external rotation 20x ea  R upper trap stretch w overpressure 3 x 30 sec  Supine pec stretch in T position x 3 min- NP      Manual therapy techniques for 10 minutes including:  Grade 3 post and inf R GH joint mob  Passive range of motion right shoulder  Scapular mobilizations NP  STM/MFR along incision line and adjacent musculature NP    Patient Education and Home Exercises     Home Exercises Provided and Patient Education Provided     Education provided:   - diagnosis/prognosis, goals for therapy, role of therapy for care, exercises/HEP     Written Home Exercises Provided: Patient instructed to cont prior HEP. 6/28/22. Exercises were reviewed and Etta was able to demonstrate them prior to the end of the session.  Etta demonstrated good  understanding of the education provided. See EMR under Patient Instructions for exercises provided during therapy sessions    ASSESSMENT     Etta presents s/p ORIF right proximal humerus fracture, week 20 of recovery. She has minimal complaints of pain with primary deficit remaining shoulder stiffness. She requires heavy cuing for shoulder elevation with flexion activities. Resumed manual techniques for glenohumeral mobility with long duration stretches. She is progressing steadily with strength though consistent monitoring for compensatory strategies. Monitor for approach of therapeutic plateau in upcoming visits. She was appropriately fatigued without increased pain post " treatment.     Etta Is progressing well towards her goals.   Pt prognosis is Good.     Pt will continue to benefit from skilled outpatient physical therapy to address the deficits listed in the problem list box on initial evaluation, provide pt/family education and to maximize pt's level of independence in the home and community environment.     Pt's spiritual, cultural and educational needs considered and pt agreeable to plan of care and goals.     Anticipated barriers to physical therapy: co-morbidities, time since sx     Goals:  Short-Term Goals: 4 weeks  - The patient will be independent with initial home exercise program. Met  - The patient will demonstrate good unsupported sitting posture with min verbal cues for 15 minutes. Progressing, not met  - The patient will increase ROM 15 degrees to perform bathing and hygiene, grooming, toileting and dressing with pain < 0/10. Met for scaption, external rotation. Not met for abd, internal rotation.  - The patient will increase strength by 1.2 muscle grade  to perform bathing and hygiene, grooming, toileting and dressing with pain < 0/10. Progressing, not met     Long-Term Goals: 8 weeks  - The patient will be independent with home exercise program and symptom management. Progressing, not met  - The patient will increase ROM = to uninvolved shoulder  to perform work duties and recreation/leisure activities   with pain < 0/10. Progressing, not met  - The patient will increase strength = to uninvolved shoulder  to perform work duties and recreation/leisure activities   with pain < 0/10. Progressing, not met    PLAN     Plan of care Certification: 08/16/2022- 9/30/22     Outpatient Physical Therapy 2 times weekly for 6 weeks to include the following interventions: Aquatic Therapy, Manual Therapy, Moist Heat/ Ice, Neuromuscular Re-ed, Patient Education, Therapeutic Activities and Therapeutic Exercise.     Progress shoulder ROM and UE strengthening.    TRENA CESAR, PT    9/20/2022

## 2022-09-22 ENCOUNTER — CLINICAL SUPPORT (OUTPATIENT)
Dept: REHABILITATION | Facility: HOSPITAL | Age: 62
End: 2022-09-22
Payer: COMMERCIAL

## 2022-09-22 DIAGNOSIS — M25.611 DECREASED RIGHT SHOULDER RANGE OF MOTION: Primary | ICD-10-CM

## 2022-09-22 DIAGNOSIS — R29.3 POSTURE ABNORMALITY: ICD-10-CM

## 2022-09-22 DIAGNOSIS — R29.898 DECREASED STRENGTH OF UPPER EXTREMITY: ICD-10-CM

## 2022-09-22 PROCEDURE — 97110 THERAPEUTIC EXERCISES: CPT | Mod: PN

## 2022-09-22 PROCEDURE — 97140 MANUAL THERAPY 1/> REGIONS: CPT | Mod: PN

## 2022-09-22 NOTE — PROGRESS NOTES
"[facility]   Physical Therapy Treatment Note     Name: Etta Irwin  Clinic Number: [id number]    Therapy Diagnosis:   Encounter Diagnoses   Name Primary?    Decreased right shoulder range of motion Yes    Decreased strength of upper extremity     Posture abnormality        Physician: Dwain Galaviz MD    Visit Date: 9/22/2022    Physician Orders: PT Eval and Treat "Begin tonny PROM"  Medical Diagnosis from Referral: S42.201D (ICD-10-CM) - Closed fracture of proximal end of right humerus with routine healing, subsequent encounter  Evaluation Date: 6/20/2022  Authorization Period Expiration: 12/31/22  Plan of Care Expiration:09/30/22  Progress Note Due: 9/30/22  Visit # / Visits authorized: 21/ 50   FOTO: next at dc    Precautions: Standard     PTA Visit #: 2 /5     Time In: 10:00 am  Time Out:10:50am  Total Billable Time: 50 minutes- 2 TE, 1 MT    DOS: 4/28/22 ORIF of proximal humerus     SUBJECTIVE     Pt reports: "my arm is perfect"  she is pleased with her lack of pain  She was compliant with home exercise program.  Response to previous treatment: good   Functional change: able to use arm more fixed her hair in to a ponytail for the first time    Pain: 0/10  Location: right shoulder     OBJECTIVE     Objective measurements updated at progress report unless specified.    Treatment     Etta received the treatments listed below:     THERAPEUTIC EXERCISES to develop strength, endurance, ROM, flexibility, posture and core stabilization for 38  minutes including:      UBE 2'/2' no resistance   Pulleys flex, scaption x 1 min each (without inferior glide YSC 10x ea)NP  Supine chest press w green, (4#) dowel 2x15  HOB elevated shoulder flex w 2# dowel 5 sec hold 3x10  Shoulder flexion vertical dowel 3# 2x10  Sidelying external rotation 1# 2x15  Sidelying Horizontal abduction 1# 2x15   Beach ball press with shoulder flexion 2x10   Seated 90/90 external rotation elbow on table (1# " "not today) 3x10   Shoulder taps 2x10  Bent over rows 3# 3x10  Post cap stretch across chest 3 x 30 sec NP  Rows w GTB 2x15  Eccentric shoulder flexion 3# cable   Landmine dowel on table 2x10   Straight arm pull-down green band 3x10 reps  Standing shoulder flexion with external rotation yellow theraband 2x8  Serratus slides towel 1x15  internal rotation/external rotation Green/red theraband R 2x10 each   Wall slides with thoracic extensions 10x 5"      Manual therapy techniques for 12 minutes including:  Grade 3 post and inf R GH joint mob  Passive range of motion right shoulder  Scapular mobilizations NP  STM/MFR along incision line and adjacent musculature NP    Patient Education and Home Exercises     Home Exercises Provided and Patient Education Provided     Education provided:   - diagnosis/prognosis, goals for therapy, role of therapy for care, exercises/HEP     Written Home Exercises Provided: Patient instructed to cont prior HEP. 6/28/22. Exercises were reviewed and Etta was able to demonstrate them prior to the end of the session.  Etta demonstrated good  understanding of the education provided. See EMR under Patient Instructions for exercises provided during therapy sessions    ASSESSMENT     Etta presents s/p ORIF right proximal humerus fracture, week 20 of recovery. Pt presents without pain. She demonstrates steady passive range of motion improvements. Continues to have shoulder elevation with shoulder flexion requiring cuing. She is progressing well with strengthening with external rotation strength as primary deficit. Pt reports minimal functional deficits despite ongoing range of motion deficits and is feeling she is near ready for discharge. May consider dc if appropriate in next week.        Etta Is progressing well towards her goals.   Pt prognosis is Good.     Pt will continue to benefit from skilled outpatient physical therapy to address the deficits listed in the problem list box on initial " evaluation, provide pt/family education and to maximize pt's level of independence in the home and community environment.     Pt's spiritual, cultural and educational needs considered and pt agreeable to plan of care and goals.     Anticipated barriers to physical therapy: co-morbidities, time since sx     Goals:  Short-Term Goals: 4 weeks  - The patient will be independent with initial home exercise program. Met  - The patient will demonstrate good unsupported sitting posture with min verbal cues for 15 minutes. Progressing, not met  - The patient will increase ROM 15 degrees to perform bathing and hygiene, grooming, toileting and dressing with pain < 0/10. Met for scaption, external rotation. Not met for abd, internal rotation.  - The patient will increase strength by 1.2 muscle grade  to perform bathing and hygiene, grooming, toileting and dressing with pain < 0/10. Progressing, not met     Long-Term Goals: 8 weeks  - The patient will be independent with home exercise program and symptom management. Progressing, not met  - The patient will increase ROM = to uninvolved shoulder  to perform work duties and recreation/leisure activities   with pain < 0/10. Progressing, not met  - The patient will increase strength = to uninvolved shoulder  to perform work duties and recreation/leisure activities   with pain < 0/10. Progressing, not met    PLAN     Plan of care Certification: 08/16/2022- 9/30/22     Outpatient Physical Therapy 2 times weekly for 6 weeks to include the following interventions: Aquatic Therapy, Manual Therapy, Moist Heat/ Ice, Neuromuscular Re-ed, Patient Education, Therapeutic Activities and Therapeutic Exercise.     Progress shoulder ROM and UE strengthening.    TRENA CESAR, PT   9/22/2022

## 2022-09-23 ENCOUNTER — PATIENT OUTREACH (OUTPATIENT)
Dept: EMERGENCY MEDICINE | Facility: HOSPITAL | Age: 62
End: 2022-09-23
Payer: COMMERCIAL

## 2022-09-23 NOTE — PROGRESS NOTES
Spoke with patient and she stated she was doing fine. Patient stated she has 2 more therapy sessions and it has really helped her. Patient denied needing any other assistance at this time.

## 2022-09-27 ENCOUNTER — CLINICAL SUPPORT (OUTPATIENT)
Dept: REHABILITATION | Facility: HOSPITAL | Age: 62
End: 2022-09-27
Payer: COMMERCIAL

## 2022-09-27 DIAGNOSIS — R29.898 DECREASED STRENGTH OF UPPER EXTREMITY: ICD-10-CM

## 2022-09-27 DIAGNOSIS — M25.611 DECREASED RIGHT SHOULDER RANGE OF MOTION: Primary | ICD-10-CM

## 2022-09-27 DIAGNOSIS — R29.3 POSTURE ABNORMALITY: ICD-10-CM

## 2022-09-27 PROCEDURE — 97110 THERAPEUTIC EXERCISES: CPT | Mod: PN,CQ

## 2022-09-27 PROCEDURE — 97140 MANUAL THERAPY 1/> REGIONS: CPT | Mod: PN,CQ

## 2022-09-27 NOTE — PROGRESS NOTES
"[facility]   Physical Therapy Treatment Note     Name: Etta Irwin  Clinic Number: [id number]    Therapy Diagnosis:   Encounter Diagnoses   Name Primary?    Decreased right shoulder range of motion Yes    Decreased strength of upper extremity     Posture abnormality        Physician: Dwain Galaviz MD    Visit Date: 9/27/2022    Physician Orders: PT Eval and Treat "Begin tonny PROM"  Medical Diagnosis from Referral: S42.201D (ICD-10-CM) - Closed fracture of proximal end of right humerus with routine healing, subsequent encounter  Evaluation Date: 6/20/2022  Authorization Period Expiration: 12/31/22  Plan of Care Expiration:09/30/22  Progress Note Due: 9/30/22  Visit # / Visits authorized: 22/ 50   FOTO: next at dc    Precautions: Standard     PTA Visit #: 2 /5     Time In: 11:00 am  Time Out:12:00 pm  Total Billable Time: 55 minutes- 3 TE, 1 MT    DOS: 4/28/22 ORIF of proximal humerus     SUBJECTIVE     Pt reports: "doing well"  Voiced no complaints of pain.  Response to previous treatment: good   Functional change: able to use arm more fixed her hair in to a ponytail for the first time    Pain: 0/10  Location: right shoulder     OBJECTIVE     Objective measurements updated at progress report unless specified.    Treatment     Etta received the treatments listed below:     THERAPEUTIC EXERCISES to develop strength, endurance, ROM, flexibility, posture and core stabilization for 43  minutes including:      UBE 2'/2' no resistance   Pulleys flex, scaption x 1 min each (without inferior glide YSC 10x ea)NP  Supine chest press w green, (4#) dowel 2x15  HOB elevated shoulder flex w 2# dowel 5 sec hold 3x10  Shoulder flexion vertical dowel 3# 2x10  Sidelying external rotation 1# 2x15  Sidelying Horizontal abduction 1# 2x15   Beach ball press with shoulder flexion 2x10   Seated 90/90 external rotation elbow on table (1# not today) 3x10   Shoulder taps 2x10  Bent over rows 3# " "3x10  Post cap stretch across chest 3 x 30 sec NP  Rows w GTB 2x15  Eccentric shoulder flexion 3# cable   Landmine 2# dowel on table 2x10   Straight arm pull-down green band 3x10 reps  Standing shoulder flexion with external rotation yellow theraband 2x8  Serratus slides towel 1x15  internal rotation/external rotation Green/red theraband R 2x10 each   Wall slides with thoracic extensions 10x 5"      Manual therapy techniques for 12 minutes including:  Grade 3 post and inf R GH joint mob  Passive range of motion right shoulder  Scapular mobilizations NP  STM/MFR along incision line and adjacent musculature     Patient Education and Home Exercises     Home Exercises Provided and Patient Education Provided     Education provided:   - diagnosis/prognosis, goals for therapy, role of therapy for care, exercises/HEP     Written Home Exercises Provided: Patient instructed to cont prior HEP. 6/28/22. Exercises were reviewed and Etta was able to demonstrate them prior to the end of the session.  Etta demonstrated good  understanding of the education provided. See EMR under Patient Instructions for exercises provided during therapy sessions    ASSESSMENT     Etta presents s/p ORIF right proximal humerus fracture.  Pt presents to clinic pain free. She demonstrates steady passive range of motion improvements. Continues to have shoulder elevation with shoulder flexion requiring cuing to eliminate. She is progressing well with strengthening with external rotation strength continues to be primary deficit. Pt reports she is near ready for discharge. May consider dc if appropriate in next week.        Etta Is progressing well towards her goals.   Pt prognosis is Good.     Pt will continue to benefit from skilled outpatient physical therapy to address the deficits listed in the problem list box on initial evaluation, provide pt/family education and to maximize pt's level of independence in the home and community environment. "     Pt's spiritual, cultural and educational needs considered and pt agreeable to plan of care and goals.     Anticipated barriers to physical therapy: co-morbidities, time since sx     Goals:  Short-Term Goals: 4 weeks  - The patient will be independent with initial home exercise program. Met  - The patient will demonstrate good unsupported sitting posture with min verbal cues for 15 minutes. Progressing, not met  - The patient will increase ROM 15 degrees to perform bathing and hygiene, grooming, toileting and dressing with pain < 0/10. Met for scaption, external rotation. Not met for abd, internal rotation.  - The patient will increase strength by 1.2 muscle grade  to perform bathing and hygiene, grooming, toileting and dressing with pain < 0/10. Progressing, not met     Long-Term Goals: 8 weeks  - The patient will be independent with home exercise program and symptom management. Progressing, not met  - The patient will increase ROM = to uninvolved shoulder  to perform work duties and recreation/leisure activities   with pain < 0/10. Progressing, not met  - The patient will increase strength = to uninvolved shoulder  to perform work duties and recreation/leisure activities   with pain < 0/10. Progressing, not met    PLAN     Plan of care Certification: 08/16/2022- 9/30/22     Outpatient Physical Therapy 2 times weekly for 6 weeks to include the following interventions: Aquatic Therapy, Manual Therapy, Moist Heat/ Ice, Neuromuscular Re-ed, Patient Education, Therapeutic Activities and Therapeutic Exercise.     Progress shoulder ROM and UE strengthening.    Yanira Hughes, PTA   9/27/2022

## 2022-09-28 NOTE — PROGRESS NOTES
[facility]   Physical Therapy Treatment Note and Discharge Summary    Name: Etta Irwin  Clinic Number: [id number]    Therapy Diagnosis:   Encounter Diagnoses   Name Primary?    Decreased right shoulder range of motion Yes    Decreased strength of upper extremity     Posture abnormality      Physician: Dwain Galaviz MD    Visit Date: 9/29/2022    Physician Orders: PT Eval and Treat  Medical Diagnosis from Referral: S42.201D (ICD-10-CM) - Closed fracture of proximal end of right humerus with routine healing, subsequent encounter  Evaluation Date: 6/20/2022  Authorization Period Expiration: 12/31/22  Plan of Care Expiration:09/30/22  Progress Note Due: 9/30/22  Visit # / Visits authorized: 23/ 50   Precautions: Standard     Time In: 1000  Time Out:1030  Total Billable Time: 30 minutes    DOS: 4/28/22 ORIF of proximal humerus     SUBJECTIVE     Pt reports: she is not having any pain and would like today to be her last day so that she can continue her strengthening at home.  Response to previous treatment: good   Functional change: able to use arm more fixed her hair in to a ponytail for the first time    Pain: 0/10  Location: right shoulder     OBJECTIVE     Passive Range of Motion:   Shoulder Right   Scaption 140 degrees   Abduction 140 degrees   ER at 90 75 degrees   IR 67 degrees      Active Range of Motion:   Shoulder Right Left   Flexion 90 degrees 165 deg   Abduction 80 degrees 168 deg   ER (behind head) occiput C6   IR (behind back) L5 T10      Upper Extremity Strength:    Shoulder Left Right   Flexion 4/5 3+/5   Abduction 4/5 4-/5   ER 4/5 3/5   IR 4+5 4-/5      Treatment     Etta received the treatments listed below:     THERAPEUTIC EXERCISES to develop strength, endurance, ROM, flexibility, posture and core stabilization for 00 minutes including:      Supine chest press w green, (4#) dowel 2x15  Sidelying external rotation 1# 2x15  Beach ball press with shoulder  "flexion 2x10   Seated 90/90 external rotation elbow on table (1# not today) 3x10   Shoulder taps 2x10  Bent over rows 3# 3x10  Rows w GTB 2x15  Eccentric shoulder flexion 3# cable   Landmine 2# dowel on table 2x10   Straight arm pull-down green band 3x10 reps  Standing shoulder flexion with external rotation yellow theraband 2x8  Serratus slides towel 1x15  internal rotation/external rotation Green/red theraband R 2x10 each   Wall slides - 10x 5"    Not Today:  UBE 2'/2' no resistance   Post cap stretch across chest 3 x 30 sec  HOB elevated shoulder flex w 2# dowel 5 sec hold 3x10  Shoulder flexion vertical dowel 3# 2x10    Manual therapy techniques for 00 minutes including:  Grade 3 post and inf R GH joint mob  Scapular mobilizations      Therapeutic Activities to improve functional performance for 30 minutes, including:  Reassessment  Education (see below)  Questions and answers     Patient Education and Home Exercises     Home Exercises Provided and Patient Education Provided     Education provided:   - diagnosis/prognosis, goals for therapy, role of therapy for care, exercises/HEP     Written Home Exercises Provided: Patient instructed to cont prior HEP. 6/28/22. Exercises were reviewed and Etta was able to demonstrate them prior to the end of the session.  Etta demonstrated good  understanding of the education provided. See EMR under Patient Instructions for exercises provided during therapy sessions    ASSESSMENT     Etta presents s/p ORIF right proximal humerus fracture on 4/28/2022.  She has met 4 out of 7 goals as of the 23rd visit and is progressing as expected towards her remaining goals. She is independent with her home exercise program and would like to continue her strength training at home over then next 2 to 3 months. She understands that she will not achieve full active range of motion, but she has progressed well with passive motion. Her remaining limitations are primarily due to strength and " not pain. She was issued an updated home exercise program and resistance bands. She will call or return to the clinic if her independent strength training fails or if any additional symptoms arise. Patient is in agreement with plan. Patient is discharged.     Etta Is progressing well towards her goals.   Pt prognosis is Good.     Pt will continue to benefit from skilled outpatient physical therapy to address the deficits listed in the problem list box on initial evaluation, provide pt/family education and to maximize pt's level of independence in the home and community environment. Pt's spiritual, cultural and educational needs considered and pt agreeable to plan of care and goals.     Anticipated barriers to physical therapy: co-morbidities, time since sx     Goals:  Short-Term Goals: 4 weeks  - The patient will be independent with initial home exercise program. MET  - The patient will demonstrate good unsupported sitting posture with min verbal cues for 15 minutes. MET  - The patient will increase ROM 15 degrees to perform bathing and hygiene, grooming, toileting and dressing with pain < 0/10. Met   - The patient will increase strength by 1.2 muscle grade  to perform bathing and hygiene, grooming, toileting and dressing with pain < 0/10. Progressing, not met     Long-Term Goals: 8 weeks  - The patient will be independent with home exercise program and symptom management. MET  - The patient will increase ROM = to uninvolved shoulder  to perform work duties and recreation/leisure activities   with pain < 0/10. Progressing, not met  - The patient will increase strength = to uninvolved shoulder  to perform work duties and recreation/leisure activities   with pain < 0/10. Progressing, not met    PLAN     Patient is discharged.    Isiah Hi, PT , DPT, OCS  9/29/2022

## 2022-09-29 ENCOUNTER — CLINICAL SUPPORT (OUTPATIENT)
Dept: REHABILITATION | Facility: HOSPITAL | Age: 62
End: 2022-09-29
Payer: COMMERCIAL

## 2022-09-29 DIAGNOSIS — R29.3 POSTURE ABNORMALITY: ICD-10-CM

## 2022-09-29 DIAGNOSIS — R29.898 DECREASED STRENGTH OF UPPER EXTREMITY: ICD-10-CM

## 2022-09-29 DIAGNOSIS — M25.611 DECREASED RIGHT SHOULDER RANGE OF MOTION: Primary | ICD-10-CM

## 2022-09-29 PROCEDURE — 97530 THERAPEUTIC ACTIVITIES: CPT | Mod: PN

## 2022-10-04 ENCOUNTER — OFFICE VISIT (OUTPATIENT)
Dept: OPHTHALMOLOGY | Facility: CLINIC | Age: 62
End: 2022-10-04
Payer: COMMERCIAL

## 2022-10-04 DIAGNOSIS — D31.32 CHOROIDAL NEVUS, LEFT: Primary | ICD-10-CM

## 2022-10-04 DIAGNOSIS — H25.13 AGE-RELATED NUCLEAR CATARACT OF BOTH EYES: ICD-10-CM

## 2022-10-04 DIAGNOSIS — H33.313 RETINAL TEAR OF BOTH EYES: ICD-10-CM

## 2022-10-04 PROCEDURE — 99204 OFFICE O/P NEW MOD 45 MIN: CPT | Mod: S$GLB,,, | Performed by: OPHTHALMOLOGY

## 2022-10-04 PROCEDURE — 1159F MED LIST DOCD IN RCRD: CPT | Mod: CPTII,S$GLB,, | Performed by: OPHTHALMOLOGY

## 2022-10-04 PROCEDURE — 1160F RVW MEDS BY RX/DR IN RCRD: CPT | Mod: CPTII,S$GLB,, | Performed by: OPHTHALMOLOGY

## 2022-10-04 PROCEDURE — 92134 CPTRZ OPH DX IMG PST SGM RTA: CPT | Mod: S$GLB,,, | Performed by: OPHTHALMOLOGY

## 2022-10-04 PROCEDURE — 1160F PR REVIEW ALL MEDS BY PRESCRIBER/CLIN PHARMACIST DOCUMENTED: ICD-10-PCS | Mod: CPTII,S$GLB,, | Performed by: OPHTHALMOLOGY

## 2022-10-04 PROCEDURE — 99204 PR OFFICE/OUTPT VISIT, NEW, LEVL IV, 45-59 MIN: ICD-10-PCS | Mod: S$GLB,,, | Performed by: OPHTHALMOLOGY

## 2022-10-04 PROCEDURE — 99999 PR PBB SHADOW E&M-EST. PATIENT-LVL II: ICD-10-PCS | Mod: PBBFAC,,, | Performed by: OPHTHALMOLOGY

## 2022-10-04 PROCEDURE — 99999 PR PBB SHADOW E&M-EST. PATIENT-LVL II: CPT | Mod: PBBFAC,,, | Performed by: OPHTHALMOLOGY

## 2022-10-04 PROCEDURE — 4010F ACE/ARB THERAPY RXD/TAKEN: CPT | Mod: CPTII,S$GLB,, | Performed by: OPHTHALMOLOGY

## 2022-10-04 PROCEDURE — 1159F PR MEDICATION LIST DOCUMENTED IN MEDICAL RECORD: ICD-10-PCS | Mod: CPTII,S$GLB,, | Performed by: OPHTHALMOLOGY

## 2022-10-04 PROCEDURE — 92134 OCT, RETINA - OU - BOTH EYES: ICD-10-PCS | Mod: S$GLB,,, | Performed by: OPHTHALMOLOGY

## 2022-10-04 PROCEDURE — 4010F PR ACE/ARB THEARPY RXD/TAKEN: ICD-10-PCS | Mod: CPTII,S$GLB,, | Performed by: OPHTHALMOLOGY

## 2022-10-04 NOTE — PROGRESS NOTES
HPI     Pharrel    Choroidal nevus OS eval   LASIK OU years ago   GLC suspect   DANIA OU   HBP   DM TYPE 2   Xiidra BID OU    Floaters sometimes no flashes of light  No pain/discomfort  VA ok   Uses readers otc only    Hemoglobin A1C       Date                     Value               Ref Range             Status                04/29/2021               5.6                 4.0 - 5.6 %           Final                  Last edited by Gloria Palma on 10/4/2022  9:04 AM.         A/P    ICD-10-CM ICD-9-CM   1. Choroidal nevus, left  D31.32 224.6   2. Retinal tear of both eyes  H33.313 361.00   3. Age-related nuclear cataract of both eyes  H25.13 366.16       1. Choroidal nevus, left  Referral for nevus eval  Longstanding nevus per pt  Flat 1.5 DD nevus in posterior pole, no IRF/SRF no heme, flat  Plan: Observation      2. Retinal tear of both eyes  Hx tear/operculum OU s/p laser  No new RT/RD  PVD OU  Plan: Observation   Pathology of PVD, Retinal Tear, Retinal Detachment reviewed in great detail  RD precautions discussed in detail, patient expressed understanding  RTC immediately PRN (especially ANY change flashes, floaters, vision, visual field)     3. Age-related nuclear cataract of both eyes  Mild NS OU, hx LASIK  Plan: Observation    RTC 6 mo DFE/OCTm OU, if stable q12m      I saw and examined the patient and reviewed in detail the findings documented. The final examination findings, image interpretations, and plan as documented in the record represent my personal judgment and conclusions.    Gibson Willoughby MD  Vitreoretinal Surgery   [facility]

## 2023-01-26 ENCOUNTER — OFFICE VISIT (OUTPATIENT)
Dept: OBSTETRICS AND GYNECOLOGY | Facility: CLINIC | Age: 63
End: 2023-01-26
Payer: MEDICARE

## 2023-01-26 VITALS
DIASTOLIC BLOOD PRESSURE: 82 MMHG | BODY MASS INDEX: 38.69 KG/M2 | SYSTOLIC BLOOD PRESSURE: 126 MMHG | WEIGHT: 198.06 LBS

## 2023-01-26 DIAGNOSIS — Z01.419 WELL WOMAN EXAM WITH ROUTINE GYNECOLOGICAL EXAM: Primary | ICD-10-CM

## 2023-01-26 PROCEDURE — 1100F PTFALLS ASSESS-DOCD GE2>/YR: CPT | Mod: CPTII,S$GLB,, | Performed by: OBSTETRICS & GYNECOLOGY

## 2023-01-26 PROCEDURE — 1159F PR MEDICATION LIST DOCUMENTED IN MEDICAL RECORD: ICD-10-PCS | Mod: CPTII,S$GLB,, | Performed by: OBSTETRICS & GYNECOLOGY

## 2023-01-26 PROCEDURE — 3079F DIAST BP 80-89 MM HG: CPT | Mod: CPTII,S$GLB,, | Performed by: OBSTETRICS & GYNECOLOGY

## 2023-01-26 PROCEDURE — 99999 PR PBB SHADOW E&M-EST. PATIENT-LVL III: CPT | Mod: PBBFAC,,, | Performed by: OBSTETRICS & GYNECOLOGY

## 2023-01-26 PROCEDURE — 3008F PR BODY MASS INDEX (BMI) DOCUMENTED: ICD-10-PCS | Mod: CPTII,S$GLB,, | Performed by: OBSTETRICS & GYNECOLOGY

## 2023-01-26 PROCEDURE — 3008F BODY MASS INDEX DOCD: CPT | Mod: CPTII,S$GLB,, | Performed by: OBSTETRICS & GYNECOLOGY

## 2023-01-26 PROCEDURE — 1160F PR REVIEW ALL MEDS BY PRESCRIBER/CLIN PHARMACIST DOCUMENTED: ICD-10-PCS | Mod: CPTII,S$GLB,, | Performed by: OBSTETRICS & GYNECOLOGY

## 2023-01-26 PROCEDURE — 3288F FALL RISK ASSESSMENT DOCD: CPT | Mod: CPTII,S$GLB,, | Performed by: OBSTETRICS & GYNECOLOGY

## 2023-01-26 PROCEDURE — 1160F RVW MEDS BY RX/DR IN RCRD: CPT | Mod: CPTII,S$GLB,, | Performed by: OBSTETRICS & GYNECOLOGY

## 2023-01-26 PROCEDURE — G0101 PR CA SCREEN;PELVIC/BREAST EXAM: ICD-10-PCS | Mod: S$GLB,,, | Performed by: OBSTETRICS & GYNECOLOGY

## 2023-01-26 PROCEDURE — 3288F PR FALLS RISK ASSESSMENT DOCUMENTED: ICD-10-PCS | Mod: CPTII,S$GLB,, | Performed by: OBSTETRICS & GYNECOLOGY

## 2023-01-26 PROCEDURE — 3074F PR MOST RECENT SYSTOLIC BLOOD PRESSURE < 130 MM HG: ICD-10-PCS | Mod: CPTII,S$GLB,, | Performed by: OBSTETRICS & GYNECOLOGY

## 2023-01-26 PROCEDURE — 99999 PR PBB SHADOW E&M-EST. PATIENT-LVL III: ICD-10-PCS | Mod: PBBFAC,,, | Performed by: OBSTETRICS & GYNECOLOGY

## 2023-01-26 PROCEDURE — 88175 CYTOPATH C/V AUTO FLUID REDO: CPT | Performed by: OBSTETRICS & GYNECOLOGY

## 2023-01-26 PROCEDURE — 3074F SYST BP LT 130 MM HG: CPT | Mod: CPTII,S$GLB,, | Performed by: OBSTETRICS & GYNECOLOGY

## 2023-01-26 PROCEDURE — 1159F MED LIST DOCD IN RCRD: CPT | Mod: CPTII,S$GLB,, | Performed by: OBSTETRICS & GYNECOLOGY

## 2023-01-26 PROCEDURE — 3079F PR MOST RECENT DIASTOLIC BLOOD PRESSURE 80-89 MM HG: ICD-10-PCS | Mod: CPTII,S$GLB,, | Performed by: OBSTETRICS & GYNECOLOGY

## 2023-01-26 PROCEDURE — G0101 CA SCREEN;PELVIC/BREAST EXAM: HCPCS | Mod: S$GLB,,, | Performed by: OBSTETRICS & GYNECOLOGY

## 2023-01-26 PROCEDURE — 1100F PR PT FALLS ASSESS DOC 2+ FALLS/FALL W/INJURY/YR: ICD-10-PCS | Mod: CPTII,S$GLB,, | Performed by: OBSTETRICS & GYNECOLOGY

## 2023-01-26 NOTE — PROGRESS NOTES
HPI:   63 y.o.   OB History          2    Para   2    Term   2            AB        Living   2         SAB        IAB        Ectopic        Multiple        Live Births                  No LMP recorded (lmp unknown). Patient is postmenopausal.    Patient is  here for her annual gynecologic exam.  She has no complaints.     ROS:  GENERAL: No fever, chills, fatigability or weight loss.  SKIN: No rashes, itching or changes in color or texture of skin.  HEAD: No headaches or recent head trauma.  EYES: Visual acuity fine. No photophobia, ocular pain or diplopia.  EARS: Denies ear pain, discharge or vertigo.  NOSE: No loss of smell, no epistaxis or postnasal drip.  MOUTH & THROAT: No hoarseness or change in voice. No excessive gum bleeding.  NODES: Denies swollen glands.  CHEST: Denies LOPEZ, cyanosis, wheezing, cough and sputum production.  CARDIOVASCULAR: Denies chest pain, PND, orthopnea or reduced exercise tolerance.  ABDOMEN: Appetite fine. No weight loss. Denies diarrhea, abdominal pain, hematemesis or blood in stool.  URINARY: No flank pain, dysuria or hematuria.  PERIPHERAL VASCULAR: No claudication or cyanosis.  MUSCULOSKELETAL: No joint stiffness or swelling. Denies back pain.  NEUROLOGIC: No history of seizures, paralysis, alteration of gait or coordination.    PE:   /82   Wt 89.8 kg (198 lb 1.3 oz)   LMP  (LMP Unknown)   BMI 38.69 kg/m²   APPEARANCE: Well nourished, well developed, in no acute distress.  NECK: Neck symmetric without masses or thyromegaly.  BREASTS: Symmetrical, no skin changes or visible lesions. No palpable masses, nipple discharge or adenopathy bilaterally.  ABDOMEN: Flat. Soft. No tenderness or masses. No hepatosplenomegaly. No hernias. No CVA tenderness.  VULVA: No lesions. Normal female genitalia.  URETHRAL MEATUS: Normal size and location, no lesions, no prolapse.  URETHRA: No masses, tenderness, prolapse or scarring.  VAGINA: Moist and well rugated, no discharge, no  significant cystocele or rectocele.  CERVIX: No lesions and discharge. PAP done.  UTERUS: Normal size, regular shape, mobile, non-tender, bladder base nontender.  ADNEXA: No masses, tenderness or CDS nodularity.  ANUS PERINEUM: Normal.    PROCEDURES:  Pap smear    Assessment:  Normal Gynecologic Exam    Plan:  Mammogram and Colonoscopy if indicated by current recommendations.  Return to clinic in one year or for any problems or complaints.  No gyn co

## 2023-01-30 ENCOUNTER — TELEPHONE (OUTPATIENT)
Dept: OBSTETRICS AND GYNECOLOGY | Facility: CLINIC | Age: 63
End: 2023-01-30
Payer: MEDICARE

## 2023-01-30 DIAGNOSIS — Z12.31 VISIT FOR SCREENING MAMMOGRAM: Primary | ICD-10-CM

## 2023-01-31 NOTE — TELEPHONE ENCOUNTER
----- Message from Ashlyn Hanson sent at 1/30/2023  3:22 PM CST -----  .Type:  Mammogram    Caller is requesting to schedule their annual mammogram appointment.  Order is not listed in EPIC.  Please enter order and contact patient to schedule.  Name of Caller:pt  Where would they like the mammogram performed?[facility]  Would the patient rather a call back or a response via [facility]? call  Best Call Back Number:[phone]  Additional Information:     Pt would like to be notified once orders are in

## 2023-02-06 LAB
FINAL PATHOLOGIC DIAGNOSIS: NORMAL
Lab: NORMAL

## 2023-02-15 ENCOUNTER — HOSPITAL ENCOUNTER (OUTPATIENT)
Dept: RADIOLOGY | Facility: HOSPITAL | Age: 63
Discharge: HOME | End: 2023-02-15
Attending: OBSTETRICS & GYNECOLOGY
Payer: MEDICARE

## 2023-02-15 DIAGNOSIS — Z12.31 VISIT FOR SCREENING MAMMOGRAM: ICD-10-CM

## 2023-02-15 PROCEDURE — 77063 MAMMO DIGITAL SCREENING BILAT WITH TOMO: ICD-10-PCS | Mod: 26,,, | Performed by: RADIOLOGY

## 2023-02-15 PROCEDURE — 77067 SCR MAMMO BI INCL CAD: CPT | Mod: 26,,, | Performed by: RADIOLOGY

## 2023-02-15 PROCEDURE — 77063 BREAST TOMOSYNTHESIS BI: CPT | Mod: 26,,, | Performed by: RADIOLOGY

## 2023-02-15 PROCEDURE — 77067 SCR MAMMO BI INCL CAD: CPT | Mod: TC

## 2023-02-15 PROCEDURE — 77067 MAMMO DIGITAL SCREENING BILAT WITH TOMO: ICD-10-PCS | Mod: 26,,, | Performed by: RADIOLOGY

## 2023-06-08 ENCOUNTER — OFFICE VISIT (OUTPATIENT)
Dept: OPHTHALMOLOGY | Facility: CLINIC | Age: 63
End: 2023-06-08
Payer: MEDICARE

## 2023-06-08 DIAGNOSIS — D31.32 CHOROIDAL NEVUS, LEFT: Primary | ICD-10-CM

## 2023-06-08 DIAGNOSIS — H25.13 AGE-RELATED NUCLEAR CATARACT OF BOTH EYES: ICD-10-CM

## 2023-06-08 DIAGNOSIS — H33.313 RETINAL TEAR OF BOTH EYES: ICD-10-CM

## 2023-06-08 PROCEDURE — 3288F PR FALLS RISK ASSESSMENT DOCUMENTED: ICD-10-PCS | Mod: CPTII,S$GLB,, | Performed by: OPHTHALMOLOGY

## 2023-06-08 PROCEDURE — 1160F RVW MEDS BY RX/DR IN RCRD: CPT | Mod: CPTII,S$GLB,, | Performed by: OPHTHALMOLOGY

## 2023-06-08 PROCEDURE — 1101F PR PT FALLS ASSESS DOC 0-1 FALLS W/OUT INJ PAST YR: ICD-10-PCS | Mod: CPTII,S$GLB,, | Performed by: OPHTHALMOLOGY

## 2023-06-08 PROCEDURE — 1160F PR REVIEW ALL MEDS BY PRESCRIBER/CLIN PHARMACIST DOCUMENTED: ICD-10-PCS | Mod: CPTII,S$GLB,, | Performed by: OPHTHALMOLOGY

## 2023-06-08 PROCEDURE — 99999 PR PBB SHADOW E&M-EST. PATIENT-LVL III: ICD-10-PCS | Mod: PBBFAC,,, | Performed by: OPHTHALMOLOGY

## 2023-06-08 PROCEDURE — 92134 CPTRZ OPH DX IMG PST SGM RTA: CPT | Mod: S$GLB,,, | Performed by: OPHTHALMOLOGY

## 2023-06-08 PROCEDURE — 99214 OFFICE O/P EST MOD 30 MIN: CPT | Mod: S$GLB,,, | Performed by: OPHTHALMOLOGY

## 2023-06-08 PROCEDURE — 99999 PR PBB SHADOW E&M-EST. PATIENT-LVL III: CPT | Mod: PBBFAC,,, | Performed by: OPHTHALMOLOGY

## 2023-06-08 PROCEDURE — 3288F FALL RISK ASSESSMENT DOCD: CPT | Mod: CPTII,S$GLB,, | Performed by: OPHTHALMOLOGY

## 2023-06-08 PROCEDURE — 4010F ACE/ARB THERAPY RXD/TAKEN: CPT | Mod: CPTII,S$GLB,, | Performed by: OPHTHALMOLOGY

## 2023-06-08 PROCEDURE — 92202 PR OPHTHALMOSCOPY, EXT, W/DRAW OPTIC NERVE/MACULA, I&R, UNI/BI: ICD-10-PCS | Mod: 59,S$GLB,, | Performed by: OPHTHALMOLOGY

## 2023-06-08 PROCEDURE — 92134 OCT, RETINA - OU - BOTH EYES: ICD-10-PCS | Mod: S$GLB,,, | Performed by: OPHTHALMOLOGY

## 2023-06-08 PROCEDURE — 1159F PR MEDICATION LIST DOCUMENTED IN MEDICAL RECORD: ICD-10-PCS | Mod: CPTII,S$GLB,, | Performed by: OPHTHALMOLOGY

## 2023-06-08 PROCEDURE — 92202 OPSCPY EXTND ON/MAC DRAW: CPT | Mod: 59,S$GLB,, | Performed by: OPHTHALMOLOGY

## 2023-06-08 PROCEDURE — 1101F PT FALLS ASSESS-DOCD LE1/YR: CPT | Mod: CPTII,S$GLB,, | Performed by: OPHTHALMOLOGY

## 2023-06-08 PROCEDURE — 99214 PR OFFICE/OUTPT VISIT, EST, LEVL IV, 30-39 MIN: ICD-10-PCS | Mod: S$GLB,,, | Performed by: OPHTHALMOLOGY

## 2023-06-08 PROCEDURE — 4010F PR ACE/ARB THEARPY RXD/TAKEN: ICD-10-PCS | Mod: CPTII,S$GLB,, | Performed by: OPHTHALMOLOGY

## 2023-06-08 PROCEDURE — 1159F MED LIST DOCD IN RCRD: CPT | Mod: CPTII,S$GLB,, | Performed by: OPHTHALMOLOGY

## 2023-06-08 NOTE — PROGRESS NOTES
HPI    6 mo DFE/OCT   DLS- 10/2022 Dr. Willoughby     Pt sts no change in vision / stable doing well.  Denies pain     (-)Flashes (-)Floaters  (-)Photophobia  (-)Glare    Choroidal nevus OS eval   LASIK OU years ago   GLC suspect   DANIA OU   HBP   DM TYPE 2   Xiidra BID OU      Last edited by Gill Hi on 6/8/2023 10:49 AM.         A/P    ICD-10-CM ICD-9-CM   1. Choroidal nevus, left  D31.32 224.6   2. Retinal tear of both eyes  H33.313 361.00   3. Age-related nuclear cataract of both eyes  H25.13 366.16         1. Choroidal nevus, left  Here for nevus f/u  Longstanding nevus per pt  Today stable Flat 1.5 DD nevus in posterior pole, no IRF/SRF no heme, flat  Plan: Observation, counseled on return precautions if incr symptoms  Amsler precautions discussed    2. Retinal tear of both eyes  Hx tear/operculum OU s/p laser  No new RT/RD  PVD OU  Plan: Observation   Pathology of PVD, Retinal Tear, Retinal Detachment reviewed in great detail  RD precautions discussed in detail, patient expressed understanding  RTC immediately PRN (especially ANY change flashes, floaters, vision, visual field)     3. Age-related nuclear cataract of both eyes  Mild NS OU, hx LASIK  Plan: Observation  Update Mrx prn      RTC 12 mo DFE/OCTm/Optos OU       I saw and examined the patient and reviewed in detail the findings documented. The final examination findings, image interpretations, and plan as documented in the record represent my personal judgment and conclusions.    Gibson Willoughby MD  Vitreoretinal Surgery   [facility]

## 2024-02-22 ENCOUNTER — TELEPHONE (OUTPATIENT)
Dept: OBSTETRICS AND GYNECOLOGY | Facility: CLINIC | Age: 64
End: 2024-02-22
Payer: MEDICARE

## 2024-02-22 DIAGNOSIS — M85.80 OSTEOPENIA, UNSPECIFIED LOCATION: Primary | ICD-10-CM

## 2024-02-22 DIAGNOSIS — M80.0B2A AGE-RELATED OSTEOPOROSIS WITH CURRENT PATHOLOGICAL FRACTURE, LEFT PELVIS, INITIAL ENCOUNTER FOR FRACTURE: ICD-10-CM

## 2024-02-22 NOTE — TELEPHONE ENCOUNTER
----- Message from Leo Deal sent at 2/22/2024  1:10 PM CST -----  .Type:  Mammogram    Caller is requesting to schedule their annual mammogram appointment.  Order is not listed in EPIC.  Please enter order and contact patient to schedule.  Name of Caller:pt  Where would they like the mammogram performed?[facility]  Would the patient rather a call back or a response via [facility]? Call back  Best Call Back Number:[phone]  Additional Information:     Pt stated she would also like a order for a bone density

## 2024-03-04 ENCOUNTER — TELEPHONE (OUTPATIENT)
Dept: OBSTETRICS AND GYNECOLOGY | Facility: CLINIC | Age: 64
End: 2024-03-04
Payer: MEDICARE

## 2024-03-04 DIAGNOSIS — Z12.31 VISIT FOR SCREENING MAMMOGRAM: Primary | ICD-10-CM

## 2024-03-04 NOTE — TELEPHONE ENCOUNTER
----- Message from Nelia Almanza sent at 3/4/2024  4:36 PM CST -----  Type:  Mammogram    Caller is requesting to schedule their annual mammogram appointment.  Order is not listed in EPIC.  Please enter order and contact patient to schedule.  Name of Caller:pt  Where would they like the mammogram performed[facility]  Would the patient rather a call back or a response via [facility]? call  Best Call Back Number:[phone]   Additional Information:

## 2024-03-04 NOTE — TELEPHONE ENCOUNTER
----- Message from Mana Gil sent at 3/4/2024  3:17 PM CST -----  Type:  Mammogram    Caller is requesting to schedule their annual mammogram appointment.  Order is not listed in EPIC.  Please enter order and contact patient to schedule.  Name of Caller: Pt  Where would they like the mammogram performed?  [facility]  Would the patient rather a call back or a response via [facility]?  call  Best Call Back Number:  [phone]  Additional Information:

## 2024-03-06 ENCOUNTER — HOSPITAL ENCOUNTER (OUTPATIENT)
Dept: RADIOLOGY | Facility: HOSPITAL | Age: 64
Discharge: HOME | End: 2024-03-06
Attending: OBSTETRICS & GYNECOLOGY
Payer: MEDICARE

## 2024-03-06 DIAGNOSIS — M80.0B2A AGE-RELATED OSTEOPOROSIS WITH CURRENT PATHOLOGICAL FRACTURE, LEFT PELVIS, INITIAL ENCOUNTER FOR FRACTURE: ICD-10-CM

## 2024-03-06 DIAGNOSIS — M85.80 OSTEOPENIA, UNSPECIFIED LOCATION: ICD-10-CM

## 2024-03-06 PROCEDURE — 77080 DXA BONE DENSITY AXIAL: CPT | Mod: 26,,, | Performed by: STUDENT IN AN ORGANIZED HEALTH CARE EDUCATION/TRAINING PROGRAM

## 2024-03-06 PROCEDURE — 77080 DXA BONE DENSITY AXIAL: CPT | Mod: TC

## 2024-03-09 ENCOUNTER — HOSPITAL ENCOUNTER (OUTPATIENT)
Dept: RADIOLOGY | Facility: HOSPITAL | Age: 64
Discharge: HOME | End: 2024-03-09
Attending: OBSTETRICS & GYNECOLOGY
Payer: MEDICARE

## 2024-03-09 DIAGNOSIS — Z12.31 VISIT FOR SCREENING MAMMOGRAM: ICD-10-CM

## 2024-03-09 PROCEDURE — 77067 SCR MAMMO BI INCL CAD: CPT | Mod: TC

## 2024-03-09 PROCEDURE — 77067 SCR MAMMO BI INCL CAD: CPT | Mod: 26,,, | Performed by: RADIOLOGY

## 2024-03-09 PROCEDURE — 77063 BREAST TOMOSYNTHESIS BI: CPT | Mod: 26,,, | Performed by: RADIOLOGY

## 2024-04-09 ENCOUNTER — OFFICE VISIT (OUTPATIENT)
Dept: OBSTETRICS AND GYNECOLOGY | Facility: CLINIC | Age: 64
End: 2024-04-09
Payer: MEDICARE

## 2024-04-09 VITALS — BODY MASS INDEX: 38.08 KG/M2 | SYSTOLIC BLOOD PRESSURE: 110 MMHG | WEIGHT: 195 LBS | DIASTOLIC BLOOD PRESSURE: 71 MMHG

## 2024-04-09 DIAGNOSIS — Z12.4 SCREENING FOR CERVICAL CANCER: ICD-10-CM

## 2024-04-09 DIAGNOSIS — Z01.419 WELL WOMAN EXAM WITH ROUTINE GYNECOLOGICAL EXAM: Primary | ICD-10-CM

## 2024-04-09 PROCEDURE — 1159F MED LIST DOCD IN RCRD: CPT | Mod: CPTII,S$GLB,, | Performed by: OBSTETRICS & GYNECOLOGY

## 2024-04-09 PROCEDURE — 88175 CYTOPATH C/V AUTO FLUID REDO: CPT | Performed by: OBSTETRICS & GYNECOLOGY

## 2024-04-09 PROCEDURE — 3078F DIAST BP <80 MM HG: CPT | Mod: CPTII,S$GLB,, | Performed by: OBSTETRICS & GYNECOLOGY

## 2024-04-09 PROCEDURE — 99999 PR PBB SHADOW E&M-EST. PATIENT-LVL III: CPT | Mod: PBBFAC,,, | Performed by: OBSTETRICS & GYNECOLOGY

## 2024-04-09 PROCEDURE — 4010F ACE/ARB THERAPY RXD/TAKEN: CPT | Mod: CPTII,S$GLB,, | Performed by: OBSTETRICS & GYNECOLOGY

## 2024-04-09 PROCEDURE — 3074F SYST BP LT 130 MM HG: CPT | Mod: CPTII,S$GLB,, | Performed by: OBSTETRICS & GYNECOLOGY

## 2024-04-09 PROCEDURE — 1126F AMNT PAIN NOTED NONE PRSNT: CPT | Mod: CPTII,S$GLB,, | Performed by: OBSTETRICS & GYNECOLOGY

## 2024-04-09 PROCEDURE — G0101 CA SCREEN;PELVIC/BREAST EXAM: HCPCS | Mod: GZ,S$GLB,, | Performed by: OBSTETRICS & GYNECOLOGY

## 2024-04-09 NOTE — PROGRESS NOTES
HPI:   64 y.o.   OB History          2    Para   2    Term   2            AB        Living   2         SAB        IAB        Ectopic        Multiple        Live Births                  No LMP recorded (lmp unknown). Patient is postmenopausal.    Patient is  here for her annual gynecologic exam.  She has no complaints.     ROS:  GENERAL: No fever, chills, fatigability or weight loss.  SKIN: No rashes, itching or changes in color or texture of skin.  HEAD: No headaches or recent head trauma.  EYES: Visual acuity fine. No photophobia, ocular pain or diplopia.  EARS: Denies ear pain, discharge or vertigo.  NOSE: No loss of smell, no epistaxis or postnasal drip.  MOUTH & THROAT: No hoarseness or change in voice. No excessive gum bleeding.  NODES: Denies swollen glands.  CHEST: Denies LOPEZ, cyanosis, wheezing, cough and sputum production.  CARDIOVASCULAR: Denies chest pain, PND, orthopnea or reduced exercise tolerance.  ABDOMEN: Appetite fine. No weight loss. Denies diarrhea, abdominal pain, hematemesis or blood in stool.  URINARY: No flank pain, dysuria or hematuria.  PERIPHERAL VASCULAR: No claudication or cyanosis.  MUSCULOSKELETAL: No joint stiffness or swelling. Denies back pain.  NEUROLOGIC: No history of seizures, paralysis, alteration of gait or coordination.    PE:   /71   Wt 88.5 kg (195 lb)   LMP  (LMP Unknown)   BMI 38.08 kg/m²   APPEARANCE: Well nourished, well developed, in no acute distress.  NECK: Neck symmetric without masses or thyromegaly.  BREASTS: Symmetrical, no skin changes or visible lesions. No palpable masses, nipple discharge or adenopathy bilaterally.  ABDOMEN: Flat. Soft. No tenderness or masses. No hepatosplenomegaly. No hernias. No CVA tenderness.  VULVA: No lesions. Normal female genitalia.  URETHRAL MEATUS: Normal size and location, no lesions, no prolapse.  URETHRA: No masses, tenderness, prolapse or scarring.  VAGINA: Moist and well rugated, no discharge, no  significant cystocele or rectocele.  CERVIX: No lesions and discharge. PAP done.  UTERUS: Normal size, regular shape, mobile, non-tender, bladder base nontender.  ADNEXA: No masses, tenderness or CDS nodularity.  ANUS PERINEUM: Normal.    PROCEDURES:  Pap smear    Assessment:  Normal Gynecologic Exam    Plan:  Mammogram and Colonoscopy if indicated by current recommendations.  Return to clinic in one year or for any problems or complaints.  No gyn co

## 2024-04-17 LAB
FINAL PATHOLOGIC DIAGNOSIS: NORMAL
Lab: NORMAL

## 2024-07-19 ENCOUNTER — CLINICAL SUPPORT (OUTPATIENT)
Dept: OPHTHALMOLOGY | Facility: CLINIC | Age: 64
End: 2024-07-19
Attending: OPHTHALMOLOGY
Payer: MEDICARE

## 2024-07-19 ENCOUNTER — OFFICE VISIT (OUTPATIENT)
Dept: OPHTHALMOLOGY | Facility: CLINIC | Age: 64
End: 2024-07-19
Payer: MEDICARE

## 2024-07-19 DIAGNOSIS — H33.313 RETINAL TEAR OF BOTH EYES: ICD-10-CM

## 2024-07-19 DIAGNOSIS — H25.13 AGE-RELATED NUCLEAR CATARACT OF BOTH EYES: ICD-10-CM

## 2024-07-19 DIAGNOSIS — D31.32 CHOROIDAL NEVUS, LEFT: Primary | ICD-10-CM

## 2024-07-19 PROCEDURE — 1101F PT FALLS ASSESS-DOCD LE1/YR: CPT | Mod: CPTII,S$GLB,, | Performed by: OPHTHALMOLOGY

## 2024-07-19 PROCEDURE — 92014 COMPRE OPH EXAM EST PT 1/>: CPT | Mod: S$GLB,,, | Performed by: OPHTHALMOLOGY

## 2024-07-19 PROCEDURE — 99999 PR PBB SHADOW E&M-EST. PATIENT-LVL III: CPT | Mod: PBBFAC,,, | Performed by: OPHTHALMOLOGY

## 2024-07-19 PROCEDURE — 92250 FUNDUS PHOTOGRAPHY W/I&R: CPT | Mod: 59,S$GLB,, | Performed by: OPHTHALMOLOGY

## 2024-07-19 PROCEDURE — 92134 CPTRZ OPH DX IMG PST SGM RTA: CPT | Mod: S$GLB,,, | Performed by: OPHTHALMOLOGY

## 2024-07-19 PROCEDURE — 1126F AMNT PAIN NOTED NONE PRSNT: CPT | Mod: CPTII,S$GLB,, | Performed by: OPHTHALMOLOGY

## 2024-07-19 PROCEDURE — 1160F RVW MEDS BY RX/DR IN RCRD: CPT | Mod: CPTII,S$GLB,, | Performed by: OPHTHALMOLOGY

## 2024-07-19 PROCEDURE — 1159F MED LIST DOCD IN RCRD: CPT | Mod: CPTII,S$GLB,, | Performed by: OPHTHALMOLOGY

## 2024-07-19 PROCEDURE — 4010F ACE/ARB THERAPY RXD/TAKEN: CPT | Mod: CPTII,S$GLB,, | Performed by: OPHTHALMOLOGY

## 2024-07-19 PROCEDURE — 3288F FALL RISK ASSESSMENT DOCD: CPT | Mod: CPTII,S$GLB,, | Performed by: OPHTHALMOLOGY

## 2024-07-19 NOTE — PROGRESS NOTES
Assessment /Plan     For exam results, see Encounter Report.    Retinal tear of both eyes  -     OCT, Retina - OU - Both Eyes      Fundus photo and Oct OU done-CC

## 2024-07-19 NOTE — PROGRESS NOTES
HPI    VA OU no changes since last visit.  Eye meds: None  Last edited by Gabriella Ayala on 7/19/2024  8:01 AM.         A/P    ICD-10-CM ICD-9-CM   1. Choroidal nevus, left  D31.32 224.6   2. Retinal tear of both eyes  H33.313 361.00   3. Age-related nuclear cataract of both eyes  H25.13 366.16         1. Choroidal nevus, left  Here for nevus f/u    Today stable Flat 1.5 DD nevus in posterior pole, no IRF/SRF    Plan: Observation, counseled on return precautions if incr symptoms    Amsler precautions discussed    2. Retinal tear of both eyes  Hx tear/operculum OU s/p laser  Temporal schisis  No new RT/RD  Plan: Observation , counseled on RT/RD risk  Pathology of PVD, Retinal Tear, Retinal Detachment reviewed in great detail  RD precautions discussed in detail, patient expressed understanding  RTC immediately PRN (especially ANY change flashes, floaters, vision, visual field)     3. Age-related nuclear cataract of both eyes  Mild NS OU, hx LASIK  Plan: Observation  Update Mrx prn      RTC 12 mo DFE/OCTm/Optos OU       I saw and examined the patient and reviewed in detail the findings documented. The final examination findings, image interpretations, and plan as documented in the record represent my personal judgment and conclusions.    Gibson Willoughby MD  Vitreoretinal Surgery   [facility]

## 2024-09-21 ENCOUNTER — OFFICE VISIT (OUTPATIENT)
Dept: URGENT CARE | Facility: CLINIC | Age: 64
End: 2024-09-21
Payer: MEDICARE

## 2024-09-21 VITALS
WEIGHT: 195 LBS | SYSTOLIC BLOOD PRESSURE: 114 MMHG | BODY MASS INDEX: 38.28 KG/M2 | TEMPERATURE: 98 F | DIASTOLIC BLOOD PRESSURE: 77 MMHG | HEART RATE: 75 BPM | HEIGHT: 60 IN | OXYGEN SATURATION: 95 % | RESPIRATION RATE: 18 BRPM

## 2024-09-21 DIAGNOSIS — J30.2 SEASONAL ALLERGIES: ICD-10-CM

## 2024-09-21 DIAGNOSIS — H65.01 NON-RECURRENT ACUTE SEROUS OTITIS MEDIA OF RIGHT EAR: ICD-10-CM

## 2024-09-21 DIAGNOSIS — H61.21 IMPACTED CERUMEN, RIGHT EAR: Primary | ICD-10-CM

## 2024-09-21 PROCEDURE — 99203 OFFICE O/P NEW LOW 30 MIN: CPT | Mod: 25,S$GLB,, | Performed by: PHYSICIAN ASSISTANT

## 2024-09-21 PROCEDURE — 69210 REMOVE IMPACTED EAR WAX UNI: CPT | Mod: RT,S$GLB,, | Performed by: PHYSICIAN ASSISTANT

## 2024-09-21 RX ORDER — AMOXICILLIN 500 MG/1
500 TABLET, FILM COATED ORAL EVERY 12 HOURS
Qty: 20 TABLET | Refills: 0 | Status: SHIPPED | OUTPATIENT
Start: 2024-09-21 | End: 2024-10-01

## 2024-09-21 NOTE — PROGRESS NOTES
Subjective:      Patient ID: Etta Irwin is a 64 y.o. female.    Vitals:  height is 5' (1.524 m) and weight is 88.5 kg (195 lb). Her oral temperature is 98.3 °F (36.8 °C). Her blood pressure is 114/77 and her pulse is 75. Her respiration is 18 and oxygen saturation is 95%.     Chief Complaint: Otalgia    64 yr female present with right ear pain and nasal congestion. Pt states that she has green snot coming out of her nose. Symptoms started about three days ago. Treatments at home include otc ear drops with no relief.    Otalgia   There is pain in the right ear. This is a new problem. The current episode started in the past 7 days. The problem occurs constantly. The problem has been gradually worsening. There has been no fever. The pain is at a severity of 3/10. The pain is mild. Pertinent negatives include no coughing, ear discharge, headaches, hearing loss, neck pain, rash or sore throat. She has tried ear drops for the symptoms. The treatment provided no relief.       Constitution: Negative for chills and fever.   HENT:  Positive for ear pain and postnasal drip. Negative for ear discharge, tinnitus, hearing loss, dental problem, drooling, mouth sores, tongue pain, congestion, sinus pain, sinus pressure, sore throat and trouble swallowing.    Neck: Negative for neck pain and neck stiffness.   Cardiovascular:  Negative for chest pain.   Eyes:  Negative for eye discharge and eye itching.   Respiratory:  Negative for cough and shortness of breath.    Musculoskeletal:  Negative for muscle ache.   Skin:  Negative for rash.   Neurological:  Negative for dizziness and headaches.      Past Medical History:   Diagnosis Date    Depression     Diabetes mellitus     Hyperlipidemia     Hypertension     Osteopenia        Past Surgical History:   Procedure Laterality Date     SECTION      CHOLECYSTECTOMY      Laparoscopic    COLONOSCOPY N/A 2022    Procedure: COLONOSCOPY;  Surgeon: Sebastián Vizcarra MD;   Location: [facility] ENDO;  Service: Endoscopy;  Laterality: N/A;    KNEE ARTHROPLASTY Left 4/29/2021    Procedure: ARTHROPLASTY, KNEE;  Surgeon: Jovi Valdovinos MD;  Location: [facility] OR;  Service: Orthopedics;  Laterality: Left;  Depuy notified 4/19/21 CC  Elías bravo/ Jomar confirmed 4/28/21 MN    OPEN REDUCTION AND INTERNAL FIXATION (ORIF) OF FRACTURE OF PROXIMAL HUMERUS Right 4/28/2022    Procedure: ORIF, FRACTURE, HUMERUS, PROXIMAL;  Surgeon: Dwain Galaviz MD;  Location: [facility] OR;  Service: Orthopedics;  Laterality: Right;  Synthes proximal humerus plates, beach chair position, open shoulder retractor set  Adonay confirmed 4/27/22 AM       Family History   Problem Relation Name Age of Onset    Uterine cancer Mother         Social History     Socioeconomic History    Marital status:    Tobacco Use    Smoking status: Never    Smokeless tobacco: Never   Substance and Sexual Activity    Alcohol use: No    Drug use: No    Sexual activity: Yes     Partners: Male     Social Determinants of Health     Financial Resource Strain: Medium Risk (7/18/2024)    Overall Financial Resource Strain (CARDIA)     Difficulty of Paying Living Expenses: Somewhat hard   Food Insecurity: Food Insecurity Present (7/18/2024)    Hunger Vital Sign     Worried About Running Out of Food in the Last Year: Sometimes true     Ran Out of Food in the Last Year: Sometimes true   Transportation Needs: No Transportation Needs (5/26/2022)    PRAPARE - Transportation     Lack of Transportation (Medical): No     Lack of Transportation (Non-Medical): No   Physical Activity: Sufficiently Active (7/18/2024)    Exercise Vital Sign     Days of Exercise per Week: 5 days     Minutes of Exercise per Session: 60 min   Stress: Stress Concern Present (7/18/2024)    [employer] - Occupational Stress Questionnaire     Feeling of Stress : To some extent   Housing Stability: Unknown (5/26/2022)    Housing Stability Vital Sign     Unable  to Pay for Housing in the Last Year: No     Unstable Housing in the Last Year: No       Current Outpatient Medications   Medication Sig Dispense Refill    acetaminophen (TYLENOL) 500 MG tablet Take 500 mg by mouth every 6 (six) hours as needed for Pain.      alendronate (FOSAMAX) 70 MG tablet Take 70 mg by mouth every Thursday.      amitriptyline (ELAVIL) 50 MG tablet Take 50 mg by mouth nightly.      amLODIPine (NORVASC) 5 MG tablet Take 5 mg by mouth once daily.      atorvastatin (LIPITOR) 40 MG tablet Take 1 tablet by mouth every evening.       escitalopram oxalate (LEXAPRO) 20 MG tablet Take 1 tablet by mouth every evening.       fish oil-omega-3 fatty acids 300-1,000 mg capsule Take 2 g by mouth once daily.      levothyroxine (SYNTHROID) 50 MCG tablet Take 50 mcg by mouth before breakfast.  0    lisinopril (PRINIVIL,ZESTRIL) 20 MG tablet Take 5 mg by mouth once daily.       metFORMIN (GLUCOPHAGE) 1000 MG tablet Take 1,000 mg by mouth 2 (two) times daily with meals.  0    multivitamin capsule Take 1 capsule by mouth once daily.      pioglitazone (ACTOS) 30 MG tablet Take 30 mg by mouth once daily.      vitamin D (VITAMIN D3) 1000 units Tab Take 1,000 Units by mouth once daily.      vitamin E 400 UNIT capsule Take 400 Units by mouth once daily.      aspirin 81 MG Chew Take 1 tablet (81 mg total) by mouth once daily.  0     No current facility-administered medications for this visit.       Review of patient's allergies indicates:  No Known Allergies    Objective:     Physical Exam   Constitutional: She is oriented to person, place, and time. She appears well-developed. She is cooperative.  Non-toxic appearance. She does not appear ill. No distress.   HENT:   Head: Normocephalic and atraumatic.   Ears:   Right Ear: Hearing, external ear and ear canal normal. impacted cerumen  Left Ear: Hearing, tympanic membrane, external ear and ear canal normal. no impacted cerumen  Nose: Rhinorrhea present. No mucosal edema,  nasal deformity or congestion. No epistaxis. Right sinus exhibits no maxillary sinus tenderness and no frontal sinus tenderness. Left sinus exhibits no maxillary sinus tenderness and no frontal sinus tenderness.   Mouth/Throat: Uvula is midline, oropharynx is clear and moist and mucous membranes are normal. Mucous membranes are moist. No trismus in the jaw. Normal dentition. No uvula swelling. No oropharyngeal exudate, posterior oropharyngeal edema or posterior oropharyngeal erythema.   Eyes: Conjunctivae and lids are normal. Right eye exhibits no discharge. Left eye exhibits no discharge. No scleral icterus.   Neck: Trachea normal and phonation normal. Neck supple. No edema present. No erythema present. No neck rigidity present.   Cardiovascular: Normal rate, regular rhythm and normal heart sounds.   No murmur heard.Exam reveals no gallop and no friction rub.   Pulmonary/Chest: Effort normal and breath sounds normal. No stridor. No respiratory distress. She has no decreased breath sounds. She has no wheezes. She has no rhonchi. She has no rales.   Abdominal: Normal appearance.   Musculoskeletal:      Cervical back: She exhibits no tenderness.   Lymphadenopathy:     She has no cervical adenopathy.   Neurological: She is alert and oriented to person, place, and time. She exhibits normal muscle tone.   Skin: Skin is warm, dry, intact, not diaphoretic, not pale and no rash.   Psychiatric: Her speech is normal and behavior is normal. Mood, judgment and thought content normal.   Nursing note and vitals reviewed.  Ear Cerumen Removal    Date/Time: 9/21/2024 2:30 PM    Performed by: Gill Knutson PA-C  Authorized by: Gill Knutson PA-C    Consent Done?:  Yes (Verbal)    Local anesthetic:  None  Ceruminolytics applied: Ceruminolytics applied prior to the procedure    Medication Used:  Other  Location details:  Right ear  Procedure type: curette and irrigation    Cerumen  Removal Results:  Cerumen completely  removed  Patient tolerance:  Patient tolerated the procedure well with no immediate complications        Assessment:     1. Impacted cerumen, right ear    2. Seasonal allergies    3. Non-recurrent acute serous otitis media of right ear        Plan:       Impacted cerumen, right ear  -     Ear Cerumen Removal    Seasonal allergies    Non-recurrent acute serous otitis media of right ear  -     amoxicillin (AMOXIL) 500 MG Tab; Take 1 tablet (500 mg total) by mouth every 12 (twelve) hours. for 10 days  Dispense: 20 tablet; Refill: 0              Results reviewed  I have reviewed the patient chart and pertinent past imaging/labs.  Patient can hear better after earwax removal    Patient Instructions   Your ears have been blocked by wax build up which can be caused by using earbuds or hearing aids usually. Some people naturally make more wax and need more regular flushing. Your ears have been successfully flushed.  Take antibiotic as prescribed for ear infection do not use any Q-tips cause irritation.  If symptoms do not improve in 2-3 days please return. They can sometime be sore after flushing take tylenol as needed for pain relief. Follow up as needed

## 2024-09-21 NOTE — PROCEDURES
Ear Cerumen Removal    Date/Time: 9/21/2024 2:30 PM    Performed by: Gill Knutson PA-C  Authorized by: Gill Kuntson PA-C    Consent Done?:  Yes (Verbal)    Local anesthetic:  None  Ceruminolytics applied: Ceruminolytics applied prior to the procedure    Medication Used:  Other  Location details:  Right ear  Procedure type: curette and irrigation    Cerumen  Removal Results:  Cerumen completely removed  Patient tolerance:  Patient tolerated the procedure well with no immediate complications

## 2024-09-21 NOTE — PATIENT INSTRUCTIONS
Your ears have been blocked by wax build up which can be caused by using earbuds or hearing aids usually. Some people naturally make more wax and need more regular flushing. Your ears have been successfully flushed.  Take antibiotic as prescribed for ear infection do not use any Q-tips cause irritation.  If symptoms do not improve in 2-3 days please return. They can sometime be sore after flushing take tylenol as needed for pain relief. Follow up as needed

## 2025-03-24 ENCOUNTER — TELEPHONE (OUTPATIENT)
Dept: OBSTETRICS AND GYNECOLOGY | Facility: CLINIC | Age: 65
End: 2025-03-24
Payer: MEDICARE

## 2025-03-24 DIAGNOSIS — Z12.31 VISIT FOR SCREENING MAMMOGRAM: Primary | ICD-10-CM

## 2025-03-24 NOTE — TELEPHONE ENCOUNTER
----- Message from Malia sent at 3/24/2025  3:33 PM CDT -----  Type:  Orders Who Called:ptDoes the patient know what this is regarding?:mammo orders  Would the patient rather a call back or a response via [facility]? Call Best Call Back Number: [phone] Information:

## 2025-04-07 ENCOUNTER — HOSPITAL ENCOUNTER (OUTPATIENT)
Dept: RADIOLOGY | Facility: HOSPITAL | Age: 65
Discharge: HOME | End: 2025-04-07
Attending: OBSTETRICS & GYNECOLOGY
Payer: MEDICARE

## 2025-04-07 DIAGNOSIS — Z12.31 VISIT FOR SCREENING MAMMOGRAM: ICD-10-CM

## 2025-04-07 PROCEDURE — 77063 BREAST TOMOSYNTHESIS BI: CPT | Mod: 26,,, | Performed by: RADIOLOGY

## 2025-04-07 PROCEDURE — 77067 SCR MAMMO BI INCL CAD: CPT | Mod: TC

## 2025-04-07 PROCEDURE — 77067 SCR MAMMO BI INCL CAD: CPT | Mod: 26,,, | Performed by: RADIOLOGY

## 2025-05-01 ENCOUNTER — OFFICE VISIT (OUTPATIENT)
Dept: OBSTETRICS AND GYNECOLOGY | Facility: CLINIC | Age: 65
End: 2025-05-01
Payer: MEDICARE

## 2025-05-01 VITALS
BODY MASS INDEX: 38.38 KG/M2 | DIASTOLIC BLOOD PRESSURE: 79 MMHG | WEIGHT: 195.5 LBS | HEIGHT: 60 IN | SYSTOLIC BLOOD PRESSURE: 117 MMHG

## 2025-05-01 DIAGNOSIS — Z01.419 WELL WOMAN EXAM WITH ROUTINE GYNECOLOGICAL EXAM: Primary | ICD-10-CM

## 2025-05-01 PROCEDURE — 99999 PR PBB SHADOW E&M-EST. PATIENT-LVL III: CPT | Mod: PBBFAC,,, | Performed by: STUDENT IN AN ORGANIZED HEALTH CARE EDUCATION/TRAINING PROGRAM

## 2025-05-01 NOTE — PROGRESS NOTES
CC: Well woman exam    HPI:  Etta Irwin is a 65 y.o. female  presents for a well woman exam.  She has no issues, problems, or complaints.      Patient history:   Past Medical History:   Diagnosis Date    Depression     Diabetes mellitus     Hyperlipidemia     Hypertension     Osteopenia      Past Surgical History:   Procedure Laterality Date     SECTION      CHOLECYSTECTOMY      Laparoscopic    COLONOSCOPY N/A 2022    Procedure: COLONOSCOPY;  Surgeon: Sebastián Vizcarra MD;  Location: [facility] ENDO;  Service: Endoscopy;  Laterality: N/A;    KNEE ARTHROPLASTY Left 2021    Procedure: ARTHROPLASTY, KNEE;  Surgeon: Jovi Valdovinos MD;  Location: [facility] OR;  Service: Orthopedics;  Laterality: Left;  Depuy notified 21 CC  Elías w/ Jomar confirmed 21 MN    OPEN REDUCTION AND INTERNAL FIXATION (ORIF) OF FRACTURE OF PROXIMAL HUMERUS Right 2022    Procedure: ORIF, FRACTURE, HUMERUS, PROXIMAL;  Surgeon: Dwain Galaviz MD;  Location: [facility] OR;  Service: Orthopedics;  Laterality: Right;  Lexy proximal humerus plates, beach chair position, open shoulder retractor set  Adonay confirmed 22 AM     OB History    Para Term  AB Living   2 2 2   2   SAB IAB Ectopic Multiple Live Births             # Outcome Date GA Lbr Eloy/2nd Weight Sex Type Anes PTL Lv   2 Term      Vag-Spont      1 Term      CS-LTranv          GYN  Menopausal: Yes  History of abnormal paps: DENIES  Abnormal or postmenopausal bleeding: DENIES  History of abnormal mammograms:DENIES   Family history of breast or ovarian cancer: DENIES  Any breast masses, pain, skin changes, or nipple discharge: DENIES  Possible recent STD exposure: denies  Contraception: N/A    Pap: Done today  Mammogram: BIRADS1, T-C=2.79% 2025      Family History   Problem Relation Name Age of Onset    Uterine cancer Mother       Social History[1]  Allergies: Patient has no known allergies.  Current Medications[2]        ROS:  GENERAL: Denies weight gain or weight loss. Feeling well overall.   SKIN: Denies rash or lesions.   HEAD: Denies head injury or headache.   NODES: Denies enlarged lymph nodes.   CHEST: Denies chest pain or shortness of breath.   CARDIOVASCULAR: Denies palpitations or left sided chest pain.   ABDOMEN: No abdominal pain, constipation, diarrhea, nausea, vomiting or rectal bleeding.   URINARY: No frequency, dysuria, hematuria, or burning on urination.  REPRODUCTIVE: See HPI.   BREASTS: The patient performs breast self-examination and denies pain, lumps, or nipple discharge.   HEMATOLOGIC: No easy bruisability or excessive bleeding.  MUSCULOSKELETAL: Denies joint pain or swelling.   NEUROLOGIC: Denies syncope or weakness.   PSYCHIATRIC: Denies depression, anxiety or mood swings.    Objective:   /79 (BP Location: Right arm)   Ht 5' (1.524 m)   Wt 88.7 kg (195 lb 8 oz)   LMP  (LMP Unknown)   BMI 38.18 kg/m²       Physical Exam:  APPEARANCE: Well nourished, well developed, in no acute distress.  AFFECT: WNL, alert and oriented x 3  SKIN: No acne or hirsutism  NECK: Neck symmetric without masses or thyromegaly  CHEST: Good respiratory effect  ABDOMEN: Soft.  No tenderness or masses.  No hepatosplenomegaly.  No hernias.  BREASTS: Symmetrical, no skin changes or visible lesions.  No palpable masses, nipple discharge bilaterally.  PELVIC: Normal external genitalia without lesions.  Normal hair distribution.  Adequate perineal body, normal urethral meatus.  Vagina atrophic without lesions or discharge.  Cervix pink, without lesions, discharge or tenderness.  No significant cystocele or rectocele.  Bimanual exam shows uterus to be normal size, regular, mobile and nontender.  Adnexa without masses or tenderness.  EXTREMITIES: No edema.    ASSESSMENT AND PLAN  1. Well woman exam with routine gynecological exam  Liquid-Based Pap Smear, Screening          Annual exam  Breast and pelvic exam: WNL  Patient  counseled on ASCCP guidelines for cervical cytology screening  Cervical screening: done today  Patient counseled on current recommendations for breast cancer screening  Mammogram screening: up to date  STD testing: not requested  Osteoporosis screening 2024 osteopenia, on fosamax      She was counseled to follow up with her PCP for other routine health maintenance      Follow up in about 1 year (around 5/1/2026).      Stephanie Heaney, MD OBGYN [facility]            [1]   Social History  Tobacco Use    Smoking status: Never    Smokeless tobacco: Never   Substance Use Topics    Alcohol use: No    Drug use: No   [2]   Current Outpatient Medications:     acetaminophen (TYLENOL) 500 MG tablet, Take 500 mg by mouth every 6 (six) hours as needed for Pain., Disp: , Rfl:     alendronate (FOSAMAX) 70 MG tablet, Take 70 mg by mouth every Thursday., Disp: , Rfl:     amitriptyline (ELAVIL) 50 MG tablet, Take 50 mg by mouth nightly., Disp: , Rfl:     amLODIPine (NORVASC) 5 MG tablet, Take 5 mg by mouth once daily., Disp: , Rfl:     atorvastatin (LIPITOR) 40 MG tablet, Take 1 tablet by mouth every evening. , Disp: , Rfl:     escitalopram oxalate (LEXAPRO) 20 MG tablet, Take 1 tablet by mouth every evening. , Disp: , Rfl:     fish oil-omega-3 fatty acids 300-1,000 mg capsule, Take 2 g by mouth once daily., Disp: , Rfl:     levothyroxine (SYNTHROID) 50 MCG tablet, Take 50 mcg by mouth before breakfast., Disp: , Rfl: 0    lisinopril (PRINIVIL,ZESTRIL) 20 MG tablet, Take 5 mg by mouth once daily. , Disp: , Rfl:     metFORMIN (GLUCOPHAGE) 1000 MG tablet, Take 1,000 mg by mouth 2 (two) times daily with meals., Disp: , Rfl: 0    multivitamin capsule, Take 1 capsule by mouth once daily., Disp: , Rfl:     pioglitazone (ACTOS) 30 MG tablet, Take 30 mg by mouth once daily., Disp: , Rfl:     vitamin D (VITAMIN D3) 1000 units Tab, Take 1,000 Units by mouth once daily., Disp: , Rfl:     vitamin E 400 UNIT capsule, Take 400 Units by mouth  once daily., Disp: , Rfl:     aspirin 81 MG Chew, Take 1 tablet (81 mg total) by mouth once daily., Disp: , Rfl: 0

## 2025-05-13 ENCOUNTER — RESULTS FOLLOW-UP (OUTPATIENT)
Dept: OBSTETRICS AND GYNECOLOGY | Facility: CLINIC | Age: 65
End: 2025-05-13

## 2025-08-22 ENCOUNTER — OFFICE VISIT (OUTPATIENT)
Dept: OPHTHALMOLOGY | Facility: CLINIC | Age: 65
End: 2025-08-22
Payer: MEDICARE

## 2025-08-22 ENCOUNTER — CLINICAL SUPPORT (OUTPATIENT)
Dept: OPHTHALMOLOGY | Facility: CLINIC | Age: 65
End: 2025-08-22
Payer: MEDICARE

## 2025-08-22 DIAGNOSIS — H33.313 RETINAL TEAR OF BOTH EYES: Primary | ICD-10-CM

## 2025-08-22 DIAGNOSIS — D31.32 CHOROIDAL NEVUS, LEFT: ICD-10-CM

## 2025-08-22 DIAGNOSIS — H25.13 AGE-RELATED NUCLEAR CATARACT OF BOTH EYES: ICD-10-CM

## 2025-08-22 PROCEDURE — 99999 PR PBB SHADOW E&M-EST. PATIENT-LVL III: CPT | Mod: PBBFAC,,, | Performed by: OPHTHALMOLOGY

## (undated) DEVICE — PAD ABDOMINAL 5X9 STERILE

## (undated) DEVICE — SOL BETADINE 5%

## (undated) DEVICE — SEE MEDLINE ITEM 157125

## (undated) DEVICE — GLOVE SURGICAL LATEX SZ 8

## (undated) DEVICE — DRESSING AQUACEL SACRAL 9 X 9

## (undated) DEVICE — DRAPE PLASTIC U 60X72

## (undated) DEVICE — NDL MAYO CAT 1/2 CIR #6

## (undated) DEVICE — NDL 18GA X1 1/2 REG BEVEL

## (undated) DEVICE — SUT VICRYL PLUS 0 CT1 18IN

## (undated) DEVICE — DRESSING TELFA STRL 4X3 LF

## (undated) DEVICE — SEE MEDLINE ITEM 146231

## (undated) DEVICE — APPLICATOR CHLORAPREP ORN 26ML

## (undated) DEVICE — STAPLER SKIN ROTATING HEAD

## (undated) DEVICE — SUT VICRYL PLUS 4-0 P3 18IN

## (undated) DEVICE — K-WIRE TRCR PT1.6MM DIA 150MM
Type: IMPLANTABLE DEVICE | Site: HUMERUS | Status: NON-FUNCTIONAL
Removed: 2022-04-28

## (undated) DEVICE — SLING ARM COMFT NAVY BLU LG

## (undated) DEVICE — DRESSING TRANS 4X4 TEGADERM

## (undated) DEVICE — Device

## (undated) DEVICE — SEE MEDLINE ITEM 157128

## (undated) DEVICE — SPONGE LAP 18X18 PREWASHED

## (undated) DEVICE — PACK OPTIMA GEN ORTHO

## (undated) DEVICE — DRAPE C-ARM/MOBILE XRAY 44X80

## (undated) DEVICE — BLADE 90X13X1.27MM

## (undated) DEVICE — DRAPE STERI U-SHAPED 47X51IN

## (undated) DEVICE — DRAPE TIBURON ORTHOPEDIC SPLIT

## (undated) DEVICE — YANKAUER OPEN TIP W/O VENT

## (undated) DEVICE — COVER OVERHEAD SURG LT BLUE

## (undated) DEVICE — DRESSING AQUACEL AG ADV 3.5X12

## (undated) DEVICE — GOWN SURGICAL XX LARGE X LONG

## (undated) DEVICE — HOOD T-5 TEAR AWAY STERILE

## (undated) DEVICE — MANIFOLD 4 PORT

## (undated) DEVICE — PAD PREP 50/CA

## (undated) DEVICE — TOWEL OR DISP STRL BLUE 4/PK

## (undated) DEVICE — SEE MEDLINE ITEM 157117

## (undated) DEVICE — GLOVE BIOGEL ORTHOPEDIC 8

## (undated) DEVICE — SEE MEDLINE ITEM 152622

## (undated) DEVICE — SUT VICRYL 3-0 27 SH

## (undated) DEVICE — TRAY MINOR GEN SURG

## (undated) DEVICE — SEE MEDLINE ITEM 146417

## (undated) DEVICE — ELECTRODE REM PLYHSV RETURN 9

## (undated) DEVICE — SYR DISP LL 5CC

## (undated) DEVICE — GAUZE AVANT SPNG 4PLY STRL 4X4

## (undated) DEVICE — SEE MEDLINE ITEM 146292

## (undated) DEVICE — DRESSING XEROFORM FOIL PK 1X8

## (undated) DEVICE — GLOVE BIOGEL PI MICRO INDIC 8

## (undated) DEVICE — GAUZE SPONGE 4X4 12PLY

## (undated) DEVICE — SEE MEDLINE ITEM 152523

## (undated) DEVICE — SYR SLIP TIP 1CC

## (undated) DEVICE — SCRUB 10% POVIDONE IODINE 4OZ

## (undated) DEVICE — DRAPE INCISE IOBAN 2 23X23IN

## (undated) DEVICE — BNDG COFLEX FOAM LF2 ST 4X5YD

## (undated) DEVICE — GLOVE BIOGEL SKINSENSE PI 6.0

## (undated) DEVICE — INTERPULSE SET

## (undated) DEVICE — CLOSURE SKIN STERI STRIP 1/2X4

## (undated) DEVICE — DRESSING TELFA N ADH 3X8

## (undated) DEVICE — ELECTRODE BLADE INSULATED 1 IN

## (undated) DEVICE — SEE MEDLINE ITEM 157116

## (undated) DEVICE — SUT 2/0 36IN COATED VICRYL

## (undated) DEVICE — NEOGUARD COVER 4X30CM STERILE

## (undated) DEVICE — DRAPE STERI-DRAPE 1000 17X11IN

## (undated) DEVICE — PACK UNIVERSAL SPLIT II

## (undated) DEVICE — SYS LABEL CORRECT MED

## (undated) DEVICE — TAPE ADH MEDIPORE 4 X 10YDS

## (undated) DEVICE — MIXER BONE CEMENT

## (undated) DEVICE — SOL NACL IRR 3000ML

## (undated) DEVICE — DRAPE STERI INSTRUMENT 1018

## (undated) DEVICE — BLADE SURG #15 CARBON STEEL

## (undated) DEVICE — SUT CTD VICRYL 2.0

## (undated) DEVICE — STOCKINET 4INX48

## (undated) DEVICE — BIT BRILL 2.5

## (undated) DEVICE — BLADE SAW SAG 25.40MM X 1.27MM

## (undated) DEVICE — GAUZE FLUFF XXLG 36X36 2 PLY

## (undated) DEVICE — SUT CTD VICRYL VIL BR CR/SH

## (undated) DEVICE — SEE MEDLINE ITEM 157131

## (undated) DEVICE — BIT DRILL 2.8 W/QC PERCU 200MM

## (undated) DEVICE — SEE MEDLINE ITEM 156955

## (undated) DEVICE — BLADE SURG CARBON STEEL #10

## (undated) DEVICE — DRAPE C-ARMOR EQUIPMENT COVER

## (undated) DEVICE — NDL HYPO REG 25G X 1 1/2

## (undated) DEVICE — DRAPE THYROID WITH ARMBOARD

## (undated) DEVICE — ADHESIVE MASTISOL VIAL 48/BX

## (undated) DEVICE — GLOVE BIOGEL SZ 8 1/2

## (undated) DEVICE — CONTAINER SPECIMEN STRL 4OZ

## (undated) DEVICE — SUT MCRYL PLUS 4-0 PS2 27IN

## (undated) DEVICE — KIT PIN STEINMANN PFC .025X5IN
Type: IMPLANTABLE DEVICE | Site: KNEE | Status: NON-FUNCTIONAL
Removed: 2021-04-29

## (undated) DEVICE — CAUTERY TIP POLISHER

## (undated) DEVICE — GLOVE BIOGEL PIMICRO INDIC 6.5

## (undated) DEVICE — SUT CTD VICRYL CT-1 27